# Patient Record
Sex: MALE | Race: WHITE | Employment: FULL TIME | URBAN - METROPOLITAN AREA
[De-identification: names, ages, dates, MRNs, and addresses within clinical notes are randomized per-mention and may not be internally consistent; named-entity substitution may affect disease eponyms.]

---

## 2020-06-06 ENCOUNTER — APPOINTMENT (OUTPATIENT)
Dept: GENERAL RADIOLOGY | Age: 35
End: 2020-06-06
Payer: COMMERCIAL

## 2020-06-06 ENCOUNTER — HOSPITAL ENCOUNTER (EMERGENCY)
Age: 35
Discharge: HOME | End: 2020-06-06
Attending: EMERGENCY MEDICINE
Payer: COMMERCIAL

## 2020-06-06 VITALS
RESPIRATION RATE: 16 BRPM | SYSTOLIC BLOOD PRESSURE: 138 MMHG | HEART RATE: 84 BPM | HEIGHT: 70 IN | DIASTOLIC BLOOD PRESSURE: 80 MMHG | WEIGHT: 145 LBS | TEMPERATURE: 98.2 F | BODY MASS INDEX: 20.76 KG/M2 | OXYGEN SATURATION: 96 %

## 2020-06-06 LAB
ANION GAP SERPL CALCULATED.3IONS-SCNC: 21 MMOL/L (ref 7–16)
BASOPHILS ABSOLUTE: 0.08 E9/L (ref 0–0.2)
BASOPHILS RELATIVE PERCENT: 1.2 % (ref 0–2)
BUN BLDV-MCNC: 12 MG/DL (ref 6–20)
CALCIUM SERPL-MCNC: 9.6 MG/DL (ref 8.6–10.2)
CHLORIDE BLD-SCNC: 99 MMOL/L (ref 98–107)
CO2: 21 MMOL/L (ref 22–29)
CREAT SERPL-MCNC: 0.8 MG/DL (ref 0.7–1.2)
EOSINOPHILS ABSOLUTE: 0.23 E9/L (ref 0.05–0.5)
EOSINOPHILS RELATIVE PERCENT: 3.3 % (ref 0–6)
GFR AFRICAN AMERICAN: >60
GFR NON-AFRICAN AMERICAN: >60 ML/MIN/1.73
GLUCOSE BLD-MCNC: 83 MG/DL (ref 74–99)
HCT VFR BLD CALC: 47.4 % (ref 37–54)
HEMOGLOBIN: 16.3 G/DL (ref 12.5–16.5)
IMMATURE GRANULOCYTES #: 0.03 E9/L
IMMATURE GRANULOCYTES %: 0.4 % (ref 0–5)
LYMPHOCYTES ABSOLUTE: 2.17 E9/L (ref 1.5–4)
LYMPHOCYTES RELATIVE PERCENT: 31.6 % (ref 20–42)
MCH RBC QN AUTO: 33 PG (ref 26–35)
MCHC RBC AUTO-ENTMCNC: 34.4 % (ref 32–34.5)
MCV RBC AUTO: 96 FL (ref 80–99.9)
MONOCYTES ABSOLUTE: 0.5 E9/L (ref 0.1–0.95)
MONOCYTES RELATIVE PERCENT: 7.3 % (ref 2–12)
NEUTROPHILS ABSOLUTE: 3.86 E9/L (ref 1.8–7.3)
NEUTROPHILS RELATIVE PERCENT: 56.2 % (ref 43–80)
PDW BLD-RTO: 13.2 FL (ref 11.5–15)
PLATELET # BLD: 180 E9/L (ref 130–450)
PMV BLD AUTO: 10.5 FL (ref 7–12)
POTASSIUM REFLEX MAGNESIUM: 3.9 MMOL/L (ref 3.5–5)
RBC # BLD: 4.94 E12/L (ref 3.8–5.8)
SODIUM BLD-SCNC: 141 MMOL/L (ref 132–146)
TROPONIN: <0.01 NG/ML (ref 0–0.03)
WBC # BLD: 6.9 E9/L (ref 4.5–11.5)

## 2020-06-06 PROCEDURE — 85025 COMPLETE CBC W/AUTO DIFF WBC: CPT

## 2020-06-06 PROCEDURE — 36415 COLL VENOUS BLD VENIPUNCTURE: CPT

## 2020-06-06 PROCEDURE — 71045 X-RAY EXAM CHEST 1 VIEW: CPT

## 2020-06-06 PROCEDURE — 93005 ELECTROCARDIOGRAM TRACING: CPT | Performed by: EMERGENCY MEDICINE

## 2020-06-06 PROCEDURE — 80048 BASIC METABOLIC PNL TOTAL CA: CPT

## 2020-06-06 PROCEDURE — 6360000002 HC RX W HCPCS: Performed by: EMERGENCY MEDICINE

## 2020-06-06 PROCEDURE — 84484 ASSAY OF TROPONIN QUANT: CPT

## 2020-06-06 PROCEDURE — 96374 THER/PROPH/DIAG INJ IV PUSH: CPT

## 2020-06-06 PROCEDURE — 99285 EMERGENCY DEPT VISIT HI MDM: CPT

## 2020-06-06 PROCEDURE — 2580000003 HC RX 258: Performed by: EMERGENCY MEDICINE

## 2020-06-06 RX ORDER — 0.9 % SODIUM CHLORIDE 0.9 %
1000 INTRAVENOUS SOLUTION INTRAVENOUS ONCE
Status: COMPLETED | OUTPATIENT
Start: 2020-06-06 | End: 2020-06-06

## 2020-06-06 RX ORDER — LORAZEPAM 2 MG/ML
0.5 INJECTION INTRAMUSCULAR EVERY 6 HOURS PRN
Status: DISCONTINUED | OUTPATIENT
Start: 2020-06-06 | End: 2020-06-06 | Stop reason: HOSPADM

## 2020-06-06 RX ADMIN — SODIUM CHLORIDE 1000 ML: 9 INJECTION, SOLUTION INTRAVENOUS at 14:46

## 2020-06-06 RX ADMIN — LORAZEPAM 0.5 MG: 2 INJECTION INTRAMUSCULAR; INTRAVENOUS at 14:45

## 2020-06-06 ASSESSMENT — ENCOUNTER SYMPTOMS
COUGH: 0
ABDOMINAL PAIN: 0
SHORTNESS OF BREATH: 0
BACK PAIN: 0

## 2020-06-07 LAB
EKG ATRIAL RATE: 90 BPM
EKG P AXIS: 56 DEGREES
EKG P-R INTERVAL: 142 MS
EKG Q-T INTERVAL: 376 MS
EKG QRS DURATION: 100 MS
EKG QTC CALCULATION (BAZETT): 459 MS
EKG R AXIS: 88 DEGREES
EKG T AXIS: 71 DEGREES
EKG VENTRICULAR RATE: 90 BPM

## 2020-06-07 PROCEDURE — 93010 ELECTROCARDIOGRAM REPORT: CPT | Performed by: INTERNAL MEDICINE

## 2020-06-07 NOTE — ED PROVIDER NOTES
This is a 20-year-old male with a past medical history of anxiety marijuana abuse and alcohol abuse presents to the ED for evaluation of palpitations. Patient states that he last smoked marijuana earlier this morning and states that shortly prior to arrival while he was getting ready to eat he developed sudden onset palpitations. She states that he then grew anxious. States that his felt as though his heart was racing and get a jump through his chest.  Patient had any chest pain or shortness of breath. Denied any leg pain leg swelling suicidal or homicidal ideation. The patient denied any mitigating or exacerbating factors to these palpitations. She states that he has had these before in the past and chalked up to anxiety. Patient states that he does not use any other drugs. States that he last drank last night and drinks approximately 4 drinks daily. The history is provided by the patient. No  was used. Review of Systems   Constitutional: Negative for fever. HENT: Negative for congestion. Eyes: Negative for visual disturbance. Respiratory: Negative for cough and shortness of breath. Cardiovascular: Positive for palpitations. Negative for chest pain. Gastrointestinal: Negative for abdominal pain. Genitourinary: Negative for dysuria. Musculoskeletal: Negative for back pain. Skin: Negative for rash. Neurological: Negative for light-headedness. Psychiatric/Behavioral: The patient is nervous/anxious. Physical Exam  Vitals signs and nursing note reviewed. Constitutional:       Appearance: Normal appearance. HENT:      Head: Normocephalic. Mouth/Throat:      Mouth: Mucous membranes are moist.   Eyes:      Extraocular Movements: Extraocular movements intact. Pupils: Pupils are equal, round, and reactive to light. Neck:      Musculoskeletal: Normal range of motion and neck supple.    Cardiovascular:      Rate and Rhythm: Normal rate and follow-up. There are no discharge medications for this patient. Diagnosis:  1. Palpitations    2. Marijuana use    3. Elevated blood pressure reading        Disposition:  Patient's disposition: Discharge to home  Patient's condition is stable.           Parish Ace DO  Resident  06/06/20 [id number]

## 2022-05-13 ENCOUNTER — APPOINTMENT (OUTPATIENT)
Dept: CT IMAGING | Age: 37
End: 2022-05-13
Payer: COMMERCIAL

## 2022-05-13 ENCOUNTER — APPOINTMENT (OUTPATIENT)
Dept: GENERAL RADIOLOGY | Age: 37
End: 2022-05-13
Payer: COMMERCIAL

## 2022-05-13 ENCOUNTER — HOSPITAL ENCOUNTER (INPATIENT)
Age: 37
LOS: 2 days | Discharge: HOME | DRG: 438 | End: 2022-05-15
Attending: INTERNAL MEDICINE | Admitting: INTERNAL MEDICINE
Payer: COMMERCIAL

## 2022-05-13 ENCOUNTER — HOSPITAL ENCOUNTER (EMERGENCY)
Age: 37
Discharge: ANOTHER HOSPITAL | End: 2022-05-13
Attending: EMERGENCY MEDICINE
Payer: COMMERCIAL

## 2022-05-13 VITALS
SYSTOLIC BLOOD PRESSURE: 128 MMHG | OXYGEN SATURATION: 98 % | DIASTOLIC BLOOD PRESSURE: 89 MMHG | HEART RATE: 138 BPM | WEIGHT: 130 LBS | RESPIRATION RATE: 18 BRPM | BODY MASS INDEX: 18.61 KG/M2 | TEMPERATURE: 98.9 F | HEIGHT: 70 IN

## 2022-05-13 DIAGNOSIS — K85.90 ACUTE PANCREATITIS, UNSPECIFIED COMPLICATION STATUS, UNSPECIFIED PANCREATITIS TYPE: Primary | ICD-10-CM

## 2022-05-13 DIAGNOSIS — R73.9 HYPERGLYCEMIA: ICD-10-CM

## 2022-05-13 DIAGNOSIS — F10.930 ALCOHOL WITHDRAWAL SYNDROME WITHOUT COMPLICATION (HCC): ICD-10-CM

## 2022-05-13 DIAGNOSIS — N17.9 AKI (ACUTE KIDNEY INJURY) (HCC): ICD-10-CM

## 2022-05-13 PROBLEM — K85.00 IDIOPATHIC ACUTE PANCREATITIS: Status: ACTIVE | Noted: 2022-05-13

## 2022-05-13 LAB
ACETAMINOPHEN LEVEL: <5 MCG/ML (ref 10–30)
ADENOVIRUS BY PCR: NOT DETECTED
ALBUMIN SERPL-MCNC: 4.1 G/DL (ref 3.5–5.2)
ALBUMIN SERPL-MCNC: 5.5 G/DL (ref 3.5–5.2)
ALP BLD-CCNC: 57 U/L (ref 40–129)
ALP BLD-CCNC: 84 U/L (ref 40–129)
ALT SERPL-CCNC: 44 U/L (ref 0–40)
ALT SERPL-CCNC: 71 U/L (ref 0–40)
AMPHETAMINE SCREEN, URINE: NOT DETECTED
ANION GAP SERPL CALCULATED.3IONS-SCNC: 16 MMOL/L (ref 7–16)
ANION GAP SERPL CALCULATED.3IONS-SCNC: 40 MMOL/L (ref 7–16)
AST SERPL-CCNC: 40 U/L (ref 0–39)
AST SERPL-CCNC: 59 U/L (ref 0–39)
B.E.: -5.6 MMOL/L (ref -3–3)
BACTERIA: ABNORMAL /HPF
BARBITURATE SCREEN URINE: NOT DETECTED
BASOPHILS ABSOLUTE: 0.01 E9/L (ref 0–0.2)
BASOPHILS ABSOLUTE: 0.03 E9/L (ref 0–0.2)
BASOPHILS RELATIVE PERCENT: 0.1 % (ref 0–2)
BASOPHILS RELATIVE PERCENT: 0.2 % (ref 0–2)
BENZODIAZEPINE SCREEN, URINE: NOT DETECTED
BETA-HYDROXYBUTYRATE: >4.5 MMOL/L (ref 0.02–0.27)
BILIRUB SERPL-MCNC: 0.6 MG/DL (ref 0–1.2)
BILIRUB SERPL-MCNC: 0.9 MG/DL (ref 0–1.2)
BILIRUBIN URINE: ABNORMAL
BLOOD, URINE: ABNORMAL
BORDETELLA PARAPERTUSSIS BY PCR: NOT DETECTED
BORDETELLA PERTUSSIS BY PCR: NOT DETECTED
BUN BLDV-MCNC: 33 MG/DL (ref 6–20)
BUN BLDV-MCNC: 33 MG/DL (ref 6–20)
CALCIUM SERPL-MCNC: 8.5 MG/DL (ref 8.6–10.2)
CALCIUM SERPL-MCNC: 9.8 MG/DL (ref 8.6–10.2)
CANNABINOID SCREEN URINE: POSITIVE
CARDIOPULMONARY BYPASS: NO
CHLAMYDOPHILIA PNEUMONIAE BY PCR: NOT DETECTED
CHLORIDE BLD-SCNC: 105 MMOL/L (ref 98–107)
CHLORIDE BLD-SCNC: 88 MMOL/L (ref 98–107)
CLARITY: CLEAR
CO2: 10 MMOL/L (ref 22–29)
CO2: 19 MMOL/L (ref 22–29)
COCAINE METABOLITE SCREEN URINE: NOT DETECTED
COLOR: YELLOW
CORONAVIRUS 229E BY PCR: NOT DETECTED
CORONAVIRUS HKU1 BY PCR: NOT DETECTED
CORONAVIRUS NL63 BY PCR: NOT DETECTED
CORONAVIRUS OC43 BY PCR: NOT DETECTED
CREAT SERPL-MCNC: 0.8 MG/DL (ref 0.7–1.2)
CREAT SERPL-MCNC: 1.7 MG/DL (ref 0.7–1.2)
DELIVERY SYSTEMS: ABNORMAL
DEVICE: ABNORMAL
EOSINOPHILS ABSOLUTE: 0 E9/L (ref 0.05–0.5)
EOSINOPHILS ABSOLUTE: 0 E9/L (ref 0.05–0.5)
EOSINOPHILS RELATIVE PERCENT: 0 % (ref 0–6)
EOSINOPHILS RELATIVE PERCENT: 0 % (ref 0–6)
EPITHELIAL CELLS, UA: ABNORMAL /HPF
ETHANOL: <10 MG/DL (ref 0–0.08)
FENTANYL SCREEN, URINE: NOT DETECTED
FIO2: 21
GFR AFRICAN AMERICAN: 55
GFR AFRICAN AMERICAN: >60
GFR NON-AFRICAN AMERICAN: 46 ML/MIN/1.73
GFR NON-AFRICAN AMERICAN: >60 ML/MIN/1.73
GLUCOSE BLD-MCNC: 170 MG/DL (ref 74–99)
GLUCOSE BLD-MCNC: 281 MG/DL (ref 74–99)
GLUCOSE URINE: NEGATIVE MG/DL
HCO3: 17.7 MMOL/L (ref 22–26)
HCT VFR BLD CALC: 34.5 % (ref 37–54)
HCT VFR BLD CALC: 47.3 % (ref 37–54)
HEMOGLOBIN: 12.1 G/DL (ref 12.5–16.5)
HEMOGLOBIN: 16.9 G/DL (ref 12.5–16.5)
HUMAN METAPNEUMOVIRUS BY PCR: NOT DETECTED
HUMAN RHINOVIRUS/ENTEROVIRUS BY PCR: NOT DETECTED
IMMATURE GRANULOCYTES #: 0.06 E9/L
IMMATURE GRANULOCYTES #: 0.12 E9/L
IMMATURE GRANULOCYTES %: 0.4 % (ref 0–5)
IMMATURE GRANULOCYTES %: 0.6 % (ref 0–5)
INFLUENZA A BY PCR: NOT DETECTED
INFLUENZA B BY PCR: NOT DETECTED
INR BLD: 0.9
KETONES, URINE: >=80 MG/DL
LACTIC ACID: 1.1 MMOL/L (ref 0.5–2.2)
LACTIC ACID: 1.1 MMOL/L (ref 0.5–2.2)
LACTIC ACID: 2.9 MMOL/L (ref 0.5–2.2)
LEUKOCYTE ESTERASE, URINE: NEGATIVE
LIPASE: 367 U/L (ref 13–60)
LIPASE: 394 U/L (ref 13–60)
LYMPHOCYTES ABSOLUTE: 0.72 E9/L (ref 1.5–4)
LYMPHOCYTES ABSOLUTE: 0.86 E9/L (ref 1.5–4)
LYMPHOCYTES RELATIVE PERCENT: 3.8 % (ref 20–42)
LYMPHOCYTES RELATIVE PERCENT: 5.6 % (ref 20–42)
Lab: ABNORMAL
MAGNESIUM: 2.2 MG/DL (ref 1.6–2.6)
MCH RBC QN AUTO: 34.8 PG (ref 26–35)
MCH RBC QN AUTO: 35.2 PG (ref 26–35)
MCHC RBC AUTO-ENTMCNC: 35.1 % (ref 32–34.5)
MCHC RBC AUTO-ENTMCNC: 35.7 % (ref 32–34.5)
MCV RBC AUTO: 100.3 FL (ref 80–99.9)
MCV RBC AUTO: 97.3 FL (ref 80–99.9)
METHADONE SCREEN, URINE: NOT DETECTED
MONOCYTES ABSOLUTE: 1.26 E9/L (ref 0.1–0.95)
MONOCYTES ABSOLUTE: 1.28 E9/L (ref 0.1–0.95)
MONOCYTES RELATIVE PERCENT: 6.6 % (ref 2–12)
MONOCYTES RELATIVE PERCENT: 8.4 % (ref 2–12)
MYCOPLASMA PNEUMONIAE BY PCR: NOT DETECTED
NEUTROPHILS ABSOLUTE: 13.06 E9/L (ref 1.8–7.3)
NEUTROPHILS ABSOLUTE: 17.03 E9/L (ref 1.8–7.3)
NEUTROPHILS RELATIVE PERCENT: 85.5 % (ref 43–80)
NEUTROPHILS RELATIVE PERCENT: 88.8 % (ref 43–80)
NITRITE, URINE: NEGATIVE
O2 SATURATION: 97.2 % (ref 92–98.5)
OPERATOR ID: ABNORMAL
OPIATE SCREEN URINE: NOT DETECTED
OXYCODONE URINE: NOT DETECTED
PARAINFLUENZA VIRUS 1 BY PCR: NOT DETECTED
PARAINFLUENZA VIRUS 2 BY PCR: NOT DETECTED
PARAINFLUENZA VIRUS 3 BY PCR: NOT DETECTED
PARAINFLUENZA VIRUS 4 BY PCR: NOT DETECTED
PCO2 37: 28.1 MMHG (ref 35–45)
PDW BLD-RTO: 12.4 FL (ref 11.5–15)
PDW BLD-RTO: 12.7 FL (ref 11.5–15)
PH 37: 7.41 (ref 7.35–7.45)
PH UA: 6 (ref 5–9)
PHENCYCLIDINE SCREEN URINE: NOT DETECTED
PHOSPHORUS: 0.6 MG/DL (ref 2.5–4.5)
PLATELET # BLD: 220 E9/L (ref 130–450)
PLATELET # BLD: 293 E9/L (ref 130–450)
PMV BLD AUTO: 10.6 FL (ref 7–12)
PMV BLD AUTO: 10.8 FL (ref 7–12)
PO2 37: 90.2 MMHG (ref 80–100)
POC SOURCE: ABNORMAL
POTASSIUM REFLEX MAGNESIUM: 4.3 MMOL/L (ref 3.5–5)
POTASSIUM SERPL-SCNC: 4.4 MMOL/L (ref 3.5–5)
PROTEIN UA: 30 MG/DL
PROTHROMBIN TIME: 10.2 SEC (ref 9.3–12.4)
RBC # BLD: 3.44 E12/L (ref 3.8–5.8)
RBC # BLD: 4.86 E12/L (ref 3.8–5.8)
RBC UA: ABNORMAL /HPF (ref 0–2)
RESPIRATORY SYNCYTIAL VIRUS BY PCR: NOT DETECTED
SALICYLATE, SERUM: 2.5 MG/DL (ref 0–30)
SARS-COV-2, PCR: NOT DETECTED
SODIUM BLD-SCNC: 138 MMOL/L (ref 132–146)
SODIUM BLD-SCNC: 140 MMOL/L (ref 132–146)
SPECIFIC GRAVITY UA: 1.02 (ref 1–1.03)
TOTAL CK: 34 U/L (ref 20–200)
TOTAL PROTEIN: 6.3 G/DL (ref 6.4–8.3)
TOTAL PROTEIN: 8.4 G/DL (ref 6.4–8.3)
TRICYCLIC ANTIDEPRESSANTS SCREEN SERUM: NEGATIVE NG/ML
TROPONIN, HIGH SENSITIVITY: 12 NG/L (ref 0–11)
TROPONIN, HIGH SENSITIVITY: 14 NG/L (ref 0–11)
UROBILINOGEN, URINE: 0.2 E.U./DL
WBC # BLD: 15.3 E9/L (ref 4.5–11.5)
WBC # BLD: 19.2 E9/L (ref 4.5–11.5)
WBC UA: ABNORMAL /HPF (ref 0–5)

## 2022-05-13 PROCEDURE — 85610 PROTHROMBIN TIME: CPT

## 2022-05-13 PROCEDURE — 80143 DRUG ASSAY ACETAMINOPHEN: CPT

## 2022-05-13 PROCEDURE — 2000000000 HC ICU R&B

## 2022-05-13 PROCEDURE — 82010 KETONE BODYS QUAN: CPT

## 2022-05-13 PROCEDURE — 85025 COMPLETE CBC W/AUTO DIFF WBC: CPT

## 2022-05-13 PROCEDURE — 6360000002 HC RX W HCPCS: Performed by: EMERGENCY MEDICINE

## 2022-05-13 PROCEDURE — 84100 ASSAY OF PHOSPHORUS: CPT

## 2022-05-13 PROCEDURE — 83735 ASSAY OF MAGNESIUM: CPT

## 2022-05-13 PROCEDURE — 36415 COLL VENOUS BLD VENIPUNCTURE: CPT

## 2022-05-13 PROCEDURE — 71045 X-RAY EXAM CHEST 1 VIEW: CPT

## 2022-05-13 PROCEDURE — 83605 ASSAY OF LACTIC ACID: CPT

## 2022-05-13 PROCEDURE — 82803 BLOOD GASES ANY COMBINATION: CPT

## 2022-05-13 PROCEDURE — 2500000003 HC RX 250 WO HCPCS: Performed by: STUDENT IN AN ORGANIZED HEALTH CARE EDUCATION/TRAINING PROGRAM

## 2022-05-13 PROCEDURE — 6360000002 HC RX W HCPCS: Performed by: STUDENT IN AN ORGANIZED HEALTH CARE EDUCATION/TRAINING PROGRAM

## 2022-05-13 PROCEDURE — 2580000003 HC RX 258: Performed by: STUDENT IN AN ORGANIZED HEALTH CARE EDUCATION/TRAINING PROGRAM

## 2022-05-13 PROCEDURE — 93005 ELECTROCARDIOGRAM TRACING: CPT | Performed by: EMERGENCY MEDICINE

## 2022-05-13 PROCEDURE — 0202U NFCT DS 22 TRGT SARS-COV-2: CPT

## 2022-05-13 PROCEDURE — 82077 ASSAY SPEC XCP UR&BREATH IA: CPT

## 2022-05-13 PROCEDURE — 82550 ASSAY OF CK (CPK): CPT

## 2022-05-13 PROCEDURE — 80053 COMPREHEN METABOLIC PANEL: CPT

## 2022-05-13 PROCEDURE — 6370000000 HC RX 637 (ALT 250 FOR IP): Performed by: STUDENT IN AN ORGANIZED HEALTH CARE EDUCATION/TRAINING PROGRAM

## 2022-05-13 PROCEDURE — 81001 URINALYSIS AUTO W/SCOPE: CPT

## 2022-05-13 PROCEDURE — 84484 ASSAY OF TROPONIN QUANT: CPT

## 2022-05-13 PROCEDURE — 87081 CULTURE SCREEN ONLY: CPT

## 2022-05-13 PROCEDURE — 96374 THER/PROPH/DIAG INJ IV PUSH: CPT

## 2022-05-13 PROCEDURE — 2500000003 HC RX 250 WO HCPCS: Performed by: INTERNAL MEDICINE

## 2022-05-13 PROCEDURE — 83690 ASSAY OF LIPASE: CPT

## 2022-05-13 PROCEDURE — 99285 EMERGENCY DEPT VISIT HI MDM: CPT

## 2022-05-13 PROCEDURE — 80307 DRUG TEST PRSMV CHEM ANLYZR: CPT

## 2022-05-13 PROCEDURE — 2580000003 HC RX 258: Performed by: EMERGENCY MEDICINE

## 2022-05-13 PROCEDURE — 80179 DRUG ASSAY SALICYLATE: CPT

## 2022-05-13 PROCEDURE — 96376 TX/PRO/DX INJ SAME DRUG ADON: CPT

## 2022-05-13 PROCEDURE — 2580000003 HC RX 258: Performed by: INTERNAL MEDICINE

## 2022-05-13 PROCEDURE — 96375 TX/PRO/DX INJ NEW DRUG ADDON: CPT

## 2022-05-13 PROCEDURE — 74176 CT ABD & PELVIS W/O CONTRAST: CPT

## 2022-05-13 RX ORDER — THIAMINE HYDROCHLORIDE 100 MG/ML
100 INJECTION, SOLUTION INTRAMUSCULAR; INTRAVENOUS DAILY
Status: DISCONTINUED | OUTPATIENT
Start: 2022-05-13 | End: 2022-05-15 | Stop reason: HOSPADM

## 2022-05-13 RX ORDER — LORAZEPAM 1 MG/1
4 TABLET ORAL
Status: DISCONTINUED | OUTPATIENT
Start: 2022-05-13 | End: 2022-05-13 | Stop reason: HOSPADM

## 2022-05-13 RX ORDER — MULTIVITAMIN WITH IRON
1 TABLET ORAL DAILY
Status: DISCONTINUED | OUTPATIENT
Start: 2022-05-13 | End: 2022-05-13 | Stop reason: HOSPADM

## 2022-05-13 RX ORDER — LORAZEPAM 1 MG/1
2 TABLET ORAL
Status: DISCONTINUED | OUTPATIENT
Start: 2022-05-13 | End: 2022-05-15 | Stop reason: HOSPADM

## 2022-05-13 RX ORDER — ONDANSETRON 2 MG/ML
4 INJECTION INTRAMUSCULAR; INTRAVENOUS EVERY 6 HOURS PRN
Status: DISCONTINUED | OUTPATIENT
Start: 2022-05-13 | End: 2022-05-15 | Stop reason: HOSPADM

## 2022-05-13 RX ORDER — LORAZEPAM 1 MG/1
4 TABLET ORAL
Status: DISCONTINUED | OUTPATIENT
Start: 2022-05-13 | End: 2022-05-15 | Stop reason: HOSPADM

## 2022-05-13 RX ORDER — FOLIC ACID 5 MG/ML
1 INJECTION, SOLUTION INTRAMUSCULAR; INTRAVENOUS; SUBCUTANEOUS DAILY
Status: DISCONTINUED | OUTPATIENT
Start: 2022-05-13 | End: 2022-05-15 | Stop reason: HOSPADM

## 2022-05-13 RX ORDER — GAUZE BANDAGE 2" X 2"
100 BANDAGE TOPICAL DAILY
Status: DISCONTINUED | OUTPATIENT
Start: 2022-05-13 | End: 2022-05-13 | Stop reason: ALTCHOICE

## 2022-05-13 RX ORDER — PAROXETINE 10 MG/1
10 TABLET, FILM COATED ORAL EVERY MORNING
COMMUNITY

## 2022-05-13 RX ORDER — POTASSIUM CHLORIDE 7.45 MG/ML
10 INJECTION INTRAVENOUS PRN
Status: DISCONTINUED | OUTPATIENT
Start: 2022-05-13 | End: 2022-05-15 | Stop reason: HOSPADM

## 2022-05-13 RX ORDER — LORAZEPAM 1 MG/1
3 TABLET ORAL
Status: DISCONTINUED | OUTPATIENT
Start: 2022-05-13 | End: 2022-05-15 | Stop reason: HOSPADM

## 2022-05-13 RX ORDER — MAGNESIUM SULFATE 1 G/100ML
1000 INJECTION INTRAVENOUS PRN
Status: DISCONTINUED | OUTPATIENT
Start: 2022-05-13 | End: 2022-05-15 | Stop reason: HOSPADM

## 2022-05-13 RX ORDER — LORAZEPAM 2 MG/ML
3 INJECTION INTRAMUSCULAR
Status: DISCONTINUED | OUTPATIENT
Start: 2022-05-13 | End: 2022-05-15 | Stop reason: HOSPADM

## 2022-05-13 RX ORDER — SODIUM CHLORIDE 0.9 % (FLUSH) 0.9 %
5-40 SYRINGE (ML) INJECTION EVERY 12 HOURS SCHEDULED
Status: DISCONTINUED | OUTPATIENT
Start: 2022-05-13 | End: 2022-05-15 | Stop reason: HOSPADM

## 2022-05-13 RX ORDER — LORAZEPAM 2 MG/ML
1 INJECTION INTRAMUSCULAR ONCE
Status: COMPLETED | OUTPATIENT
Start: 2022-05-13 | End: 2022-05-13

## 2022-05-13 RX ORDER — SODIUM CHLORIDE 0.9 % (FLUSH) 0.9 %
5-40 SYRINGE (ML) INJECTION PRN
Status: DISCONTINUED | OUTPATIENT
Start: 2022-05-13 | End: 2022-05-15 | Stop reason: HOSPADM

## 2022-05-13 RX ORDER — THIAMINE HYDROCHLORIDE 100 MG/ML
100 INJECTION, SOLUTION INTRAMUSCULAR; INTRAVENOUS DAILY
Status: DISCONTINUED | OUTPATIENT
Start: 2022-05-13 | End: 2022-05-13 | Stop reason: HOSPADM

## 2022-05-13 RX ORDER — LORAZEPAM 1 MG/1
1 TABLET ORAL
Status: DISCONTINUED | OUTPATIENT
Start: 2022-05-13 | End: 2022-05-15 | Stop reason: HOSPADM

## 2022-05-13 RX ORDER — 0.9 % SODIUM CHLORIDE 0.9 %
1000 INTRAVENOUS SOLUTION INTRAVENOUS ONCE
Status: COMPLETED | OUTPATIENT
Start: 2022-05-13 | End: 2022-05-13

## 2022-05-13 RX ORDER — POTASSIUM CHLORIDE 20 MEQ/1
40 TABLET, EXTENDED RELEASE ORAL PRN
Status: DISCONTINUED | OUTPATIENT
Start: 2022-05-13 | End: 2022-05-15 | Stop reason: HOSPADM

## 2022-05-13 RX ORDER — LORAZEPAM 2 MG/ML
4 INJECTION INTRAMUSCULAR
Status: DISCONTINUED | OUTPATIENT
Start: 2022-05-13 | End: 2022-05-15 | Stop reason: HOSPADM

## 2022-05-13 RX ORDER — LORAZEPAM 1 MG/1
3 TABLET ORAL
Status: DISCONTINUED | OUTPATIENT
Start: 2022-05-13 | End: 2022-05-13 | Stop reason: HOSPADM

## 2022-05-13 RX ORDER — SODIUM CHLORIDE 0.9 % (FLUSH) 0.9 %
5-40 SYRINGE (ML) INJECTION EVERY 12 HOURS SCHEDULED
Status: DISCONTINUED | OUTPATIENT
Start: 2022-05-13 | End: 2022-05-13 | Stop reason: HOSPADM

## 2022-05-13 RX ORDER — LORAZEPAM 2 MG/ML
1 INJECTION INTRAMUSCULAR
Status: DISCONTINUED | OUTPATIENT
Start: 2022-05-13 | End: 2022-05-13 | Stop reason: HOSPADM

## 2022-05-13 RX ORDER — LORAZEPAM 1 MG/1
1 TABLET ORAL
Status: DISCONTINUED | OUTPATIENT
Start: 2022-05-13 | End: 2022-05-13 | Stop reason: HOSPADM

## 2022-05-13 RX ORDER — LORAZEPAM 1 MG/1
2 TABLET ORAL
Status: DISCONTINUED | OUTPATIENT
Start: 2022-05-13 | End: 2022-05-13 | Stop reason: HOSPADM

## 2022-05-13 RX ORDER — LORAZEPAM 2 MG/ML
3 INJECTION INTRAMUSCULAR
Status: DISCONTINUED | OUTPATIENT
Start: 2022-05-13 | End: 2022-05-13 | Stop reason: HOSPADM

## 2022-05-13 RX ORDER — SODIUM CHLORIDE, SODIUM LACTATE, POTASSIUM CHLORIDE, CALCIUM CHLORIDE 600; 310; 30; 20 MG/100ML; MG/100ML; MG/100ML; MG/100ML
INJECTION, SOLUTION INTRAVENOUS CONTINUOUS
Status: DISCONTINUED | OUTPATIENT
Start: 2022-05-13 | End: 2022-05-13 | Stop reason: HOSPADM

## 2022-05-13 RX ORDER — LORAZEPAM 2 MG/ML
2 INJECTION INTRAMUSCULAR
Status: DISCONTINUED | OUTPATIENT
Start: 2022-05-13 | End: 2022-05-15 | Stop reason: HOSPADM

## 2022-05-13 RX ORDER — PROCHLORPERAZINE EDISYLATE 5 MG/ML
10 INJECTION INTRAMUSCULAR; INTRAVENOUS ONCE
Status: COMPLETED | OUTPATIENT
Start: 2022-05-13 | End: 2022-05-13

## 2022-05-13 RX ORDER — SODIUM CHLORIDE, SODIUM LACTATE, POTASSIUM CHLORIDE, CALCIUM CHLORIDE 600; 310; 30; 20 MG/100ML; MG/100ML; MG/100ML; MG/100ML
INJECTION, SOLUTION INTRAVENOUS CONTINUOUS
Status: DISCONTINUED | OUTPATIENT
Start: 2022-05-13 | End: 2022-05-15 | Stop reason: HOSPADM

## 2022-05-13 RX ORDER — LORAZEPAM 2 MG/ML
1 INJECTION INTRAMUSCULAR
Status: DISCONTINUED | OUTPATIENT
Start: 2022-05-13 | End: 2022-05-15 | Stop reason: HOSPADM

## 2022-05-13 RX ORDER — SODIUM CHLORIDE 9 MG/ML
INJECTION, SOLUTION INTRAVENOUS PRN
Status: DISCONTINUED | OUTPATIENT
Start: 2022-05-13 | End: 2022-05-13 | Stop reason: HOSPADM

## 2022-05-13 RX ORDER — SODIUM CHLORIDE 0.9 % (FLUSH) 0.9 %
5-40 SYRINGE (ML) INJECTION PRN
Status: DISCONTINUED | OUTPATIENT
Start: 2022-05-13 | End: 2022-05-13 | Stop reason: HOSPADM

## 2022-05-13 RX ORDER — PAROXETINE 10 MG/1
10 TABLET, FILM COATED ORAL EVERY MORNING
Status: DISCONTINUED | OUTPATIENT
Start: 2022-05-14 | End: 2022-05-15 | Stop reason: HOSPADM

## 2022-05-13 RX ORDER — LORAZEPAM 2 MG/ML
4 INJECTION INTRAMUSCULAR
Status: DISCONTINUED | OUTPATIENT
Start: 2022-05-13 | End: 2022-05-13 | Stop reason: HOSPADM

## 2022-05-13 RX ORDER — SODIUM CHLORIDE 9 MG/ML
INJECTION, SOLUTION INTRAVENOUS PRN
Status: DISCONTINUED | OUTPATIENT
Start: 2022-05-13 | End: 2022-05-15 | Stop reason: HOSPADM

## 2022-05-13 RX ORDER — LORAZEPAM 2 MG/ML
2 INJECTION INTRAMUSCULAR
Status: DISCONTINUED | OUTPATIENT
Start: 2022-05-13 | End: 2022-05-13 | Stop reason: HOSPADM

## 2022-05-13 RX ADMIN — SODIUM CHLORIDE 1000 ML: 9 INJECTION, SOLUTION INTRAVENOUS at 09:27

## 2022-05-13 RX ADMIN — SODIUM CHLORIDE, POTASSIUM CHLORIDE, SODIUM LACTATE AND CALCIUM CHLORIDE: 600; 310; 30; 20 INJECTION, SOLUTION INTRAVENOUS at 14:11

## 2022-05-13 RX ADMIN — SODIUM CHLORIDE, PRESERVATIVE FREE 10 ML: 5 INJECTION INTRAVENOUS at 21:20

## 2022-05-13 RX ADMIN — PROCHLORPERAZINE EDISYLATE 10 MG: 5 INJECTION INTRAMUSCULAR; INTRAVENOUS at 08:29

## 2022-05-13 RX ADMIN — LORAZEPAM 2 MG: 2 INJECTION INTRAMUSCULAR; INTRAVENOUS at 21:19

## 2022-05-13 RX ADMIN — THIAMINE HYDROCHLORIDE 100 MG: 100 INJECTION, SOLUTION INTRAMUSCULAR; INTRAVENOUS at 10:42

## 2022-05-13 RX ADMIN — LORAZEPAM 1 MG: 2 INJECTION INTRAMUSCULAR; INTRAVENOUS at 11:01

## 2022-05-13 RX ADMIN — THIAMINE HYDROCHLORIDE 100 MG: 100 INJECTION, SOLUTION INTRAMUSCULAR; INTRAVENOUS at 21:20

## 2022-05-13 RX ADMIN — SODIUM CHLORIDE, POTASSIUM CHLORIDE, SODIUM LACTATE AND CALCIUM CHLORIDE: 600; 310; 30; 20 INJECTION, SOLUTION INTRAVENOUS at 19:39

## 2022-05-13 RX ADMIN — LORAZEPAM 2 MG: 1 TABLET ORAL at 17:07

## 2022-05-13 RX ADMIN — FOLIC ACID 1 MG: 5 INJECTION, SOLUTION INTRAMUSCULAR; INTRAVENOUS; SUBCUTANEOUS at 17:07

## 2022-05-13 RX ADMIN — SODIUM CHLORIDE, PRESERVATIVE FREE 10 ML: 5 INJECTION INTRAVENOUS at 10:46

## 2022-05-13 RX ADMIN — SODIUM PHOSPHATE, MONOBASIC, MONOHYDRATE AND SODIUM PHOSPHATE, DIBASIC, ANHYDROUS 17.88 MMOL: 276; 142 INJECTION, SOLUTION INTRAVENOUS at 18:53

## 2022-05-13 RX ADMIN — SODIUM CHLORIDE 1000 ML: 9 INJECTION, SOLUTION INTRAVENOUS at 11:01

## 2022-05-13 RX ADMIN — SODIUM CHLORIDE 1000 ML: 9 INJECTION, SOLUTION INTRAVENOUS at 08:27

## 2022-05-13 RX ADMIN — LORAZEPAM 1 MG: 2 INJECTION INTRAMUSCULAR; INTRAVENOUS at 08:29

## 2022-05-13 ASSESSMENT — PAIN SCALES - GENERAL
PAINLEVEL_OUTOF10: 6
PAINLEVEL_OUTOF10: 0
PAINLEVEL_OUTOF10: 0

## 2022-05-13 ASSESSMENT — LIFESTYLE VARIABLES
HOW MANY STANDARD DRINKS CONTAINING ALCOHOL DO YOU HAVE ON A TYPICAL DAY: 5 OR 6
HOW OFTEN DO YOU HAVE A DRINK CONTAINING ALCOHOL: 4 OR MORE TIMES A WEEK
HOW OFTEN DO YOU HAVE A DRINK CONTAINING ALCOHOL: 4 OR MORE TIMES A WEEK
HOW MANY STANDARD DRINKS CONTAINING ALCOHOL DO YOU HAVE ON A TYPICAL DAY: 5 OR 6

## 2022-05-13 ASSESSMENT — PAIN DESCRIPTION - PAIN TYPE: TYPE: ACUTE PAIN

## 2022-05-13 ASSESSMENT — ENCOUNTER SYMPTOMS
CHOKING: 0
BLOOD IN STOOL: 0
SINUS PAIN: 0
DIARRHEA: 0
BACK PAIN: 0
ABDOMINAL PAIN: 0
SHORTNESS OF BREATH: 0
SORE THROAT: 0
ABDOMINAL PAIN: 0
COUGH: 0
SHORTNESS OF BREATH: 0
RHINORRHEA: 0
BACK PAIN: 0
EYE PAIN: 0
NAUSEA: 1
CONSTIPATION: 0
VOMITING: 1
NAUSEA: 1
COUGH: 0

## 2022-05-13 ASSESSMENT — PAIN - FUNCTIONAL ASSESSMENT
PAIN_FUNCTIONAL_ASSESSMENT: NONE - DENIES PAIN
PAIN_FUNCTIONAL_ASSESSMENT: 0-10

## 2022-05-13 ASSESSMENT — PAIN DESCRIPTION - LOCATION: LOCATION: ABDOMEN

## 2022-05-13 ASSESSMENT — PAIN DESCRIPTION - DESCRIPTORS: DESCRIPTORS: ACHING

## 2022-05-13 ASSESSMENT — PAIN DESCRIPTION - FREQUENCY: FREQUENCY: INTERMITTENT

## 2022-05-13 NOTE — ED PROVIDER NOTES
This is a 23-year-old male with a history of alcohol and marijuana abuse who presents to the ED for evaluation of nausea. Patient states since Monday has been unable to tolerate any p.o. intake. He also states he has been having some abdominal pain mostly located in his epigastric region. Patient states that he has been throwing up multiple times a day and cannot keep anything down. Patient remarks that still continues to smoke every day notes that his last drink was approximately 3 days ago. Patient states that he has no ill contacts although does state that he did eat some old pizza back on Monday. No recent abdominal surgeries fevers or chills. Patient also states that he feels achy all over. No SI or HI. No reported chest pain traumas or falls. No other reported mitigating or exacerbating factors. The history is provided by the patient. Review of Systems   Constitutional: Negative for fever. HENT: Negative for congestion. Eyes: Negative for visual disturbance. Respiratory: Negative for cough, choking and shortness of breath. Cardiovascular: Negative for chest pain. Gastrointestinal: Positive for nausea. Negative for abdominal pain. Endocrine: Negative for polyuria. Genitourinary: Negative for dysuria. Musculoskeletal: Positive for myalgias. Negative for back pain. Skin: Negative for rash. Allergic/Immunologic: Negative for immunocompromised state. Neurological: Negative for headaches. Hematological: Does not bruise/bleed easily. Psychiatric/Behavioral: Negative for confusion. Physical Exam  Vitals and nursing note reviewed. Constitutional:       General: He is not in acute distress. Appearance: He is well-developed. Comments: Thin   HENT:      Head: Normocephalic and atraumatic. Mouth/Throat:      Mouth: Mucous membranes are dry. Eyes:      Extraocular Movements: Extraocular movements intact.       Pupils: Pupils are equal, round, and reactive to light. Neck:      Vascular: No JVD. Cardiovascular:      Rate and Rhythm: Regular rhythm. Tachycardia present. Pulmonary:      Effort: Pulmonary effort is normal.      Breath sounds: No wheezing, rhonchi or rales. Abdominal:      General: There is no distension. Palpations: Abdomen is soft. Tenderness: There is no guarding or rebound. Hernia: No hernia is present. Comments: Epigastric tenderness to palpation   Musculoskeletal:      Cervical back: Normal range of motion and neck supple. Right lower leg: No edema. Left lower leg: No edema. Skin:     General: Skin is warm and dry. Capillary Refill: Capillary refill takes less than 2 seconds. Neurological:      General: No focal deficit present. Mental Status: He is alert and oriented to person, place, and time. Mental status is at baseline. Cranial Nerves: No cranial nerve deficit. Psychiatric:         Mood and Affect: Mood normal.         Behavior: Behavior normal.          Procedures     MDM  Number of Diagnoses or Management Options  Acute pancreatitis, unspecified complication status, unspecified pancreatitis type  JUDD (acute kidney injury) ([facility] [address]  Alcohol withdrawal syndrome without complication ([facility] [address]  Hyperglycemia  Diagnosis management comments: Patient presented to the ED for evaluation of nausea, vomiting and body aches. He had an anion gap as well as tachycardia. Patient had differential including, DKA, alcohol withdrawal and pancreaitis to name a few. Patient did have a large anion gap, was placed on CIWA protocol and started on IV fluids, He did have some improvement of HR but continued to be tachcyardic. Patient was not acidotic but did have ketones in urine as well as elevated beta hydroxybutrate. Will recheck BMP to reasses chemistry panel and need for insulin. Spoke with Dr. Augustine Shin as well as Dr. Dimas Cannon.        ED Course as of 05/13/22 1515   Fri May 13, 2022   0848 HR improving [BP] [address] [place] [address] Avenue with Dr. Colby Nazario who accepted patient in ICU, requested ABG and Beta Hydroxy as well as molecular film array [BP]   [phone] with Dr. Tj Dent who did accept patient [BP]      ED Course User Index  [BP] Cynthia Joseph DO         ED Course as of 05/13/22 1515   Fri May 13, 2022   0848 HR improving [BP]   1143 Spoke with Dr. Colby Nazario who accepted patient in ICU, requested ABG and Beta Hydroxy as well as molecular film array [BP]   [phone] with Dr. Tj Dent who did accept patient [BP]      ED Course User Index  [BP] Cynthia Joseph DO       --------------------------------------------- PAST HISTORY ---------------------------------------------  Past Medical History:  has a past medical history of Benign essential tremor and Tremor. Past Surgical History:  has a past surgical history that includes [place] tooth extraction. Social History:  reports that he has been smoking cigarettes. He has been smoking about 0.25 packs per day. He has never used smokeless tobacco. He reports current alcohol use of about 5.0 - 6.0 standard drinks of alcohol per week. He reports current drug use. Drug: Marijuana Rodena Capes). Family History: family history is not on file. The patients home medications have been reviewed.     Allergies: Cipro xr    -------------------------------------------------- RESULTS -------------------------------------------------    LABS:  Results for orders placed or performed during the hospital encounter of 05/13/22   Comprehensive Metabolic Panel w/ Reflex to MG   Result Value Ref Range    Sodium 138 132 - 146 mmol/L    Potassium reflex Magnesium 4.3 3.5 - 5.0 mmol/L    Chloride 88 (L) 98 - 107 mmol/L    CO2 10 (L) 22 - 29 mmol/L    Anion Gap 40 (H) 7 - 16 mmol/L    Glucose 281 (H) 74 - 99 mg/dL    BUN 33 (H) 6 - 20 mg/dL    CREATININE 1.7 (H) 0.7 - 1.2 mg/dL    GFR Non-African American 46 >=60 mL/min/1.73    GFR African American 55     Calcium 9.8 8.6 - 10.2 mg/dL    Total Protein 8.4 (H) 6.4 - 8.3 g/dL    Albumin 5.5 (H) 3.5 - 5.2 g/dL    Total Bilirubin 0.9 0.0 - 1.2 mg/dL    Alkaline Phosphatase 84 40 - 129 U/L    ALT 71 (H) 0 - 40 U/L    AST 59 (H) 0 - 39 U/L   CBC with Auto Differential   Result Value Ref Range    WBC 19.2 (H) 4.5 - 11.5 E9/L    RBC 4.86 3.80 - 5.80 E12/L    Hemoglobin 16.9 (H) 12.5 - 16.5 g/dL    Hematocrit 47.3 37.0 - 54.0 %    MCV 97.3 80.0 - 99.9 fL    MCH 34.8 26.0 - 35.0 pg    MCHC 35.7 (H) 32.0 - 34.5 %    RDW 12.4 11.5 - 15.0 fL    Platelets 972 287 - 837 E9/L    MPV 10.6 7.0 - 12.0 fL    Neutrophils % 88.8 (H) 43.0 - 80.0 %    Immature Granulocytes % 0.6 0.0 - 5.0 %    Lymphocytes % 3.8 (L) 20.0 - 42.0 %    Monocytes % 6.6 2.0 - 12.0 %    Eosinophils % 0.0 0.0 - 6.0 %    Basophils % 0.2 0.0 - 2.0 %    Neutrophils Absolute 17.03 (H) 1.80 - 7.30 E9/L    Immature Granulocytes # 0.12 E9/L    Lymphocytes Absolute 0.72 (L) 1.50 - 4.00 E9/L    Monocytes Absolute 1.26 (H) 0.10 - 0.95 E9/L    Eosinophils Absolute 0.00 (L) 0.05 - 0.50 E9/L    Basophils Absolute 0.03 0.00 - 0.20 E9/L   Lipase   Result Value Ref Range    Lipase 367 (H) 13 - 60 U/L   Lactic Acid   Result Value Ref Range    Lactic Acid 2.9 (H) 0.5 - 2.2 mmol/L   Protime-INR   Result Value Ref Range    Protime 10.2 9.3 - 12.4 sec    INR 0.9    Urine Drug Screen   Result Value Ref Range    Drug Screen Comment: see below    Urinalysis with Microscopic   Result Value Ref Range    Color, UA Yellow Straw/Yellow    Clarity, UA Clear Clear    Glucose, Ur Negative Negative mg/dL    Bilirubin Urine MODERATE (A) Negative    Ketones, Urine >=80 (A) Negative mg/dL    Specific Gravity, UA 1.025 1.005 - 1.030    Blood, Urine TRACE-INTACT Negative    pH, UA 6.0 5.0 - 9.0    Protein, UA 30 (A) Negative mg/dL    Urobilinogen, Urine 0.2 <2.0 E.U./dL    Nitrite, Urine Negative Negative    Leukocyte Esterase, Urine Negative Negative    WBC, UA 2-5 0 - 5 /HPF    RBC, UA 0-1 0 - 2 /HPF    Epithelial Cells, UA MANY /HPF    Bacteria, UA MANY (A) None Seen /HPF   Serum Drug Screen   Result Value Ref Range    Ethanol Lvl <10 mg/dL    Acetaminophen Level <5.0 (L) 10.0 - 74.8 mcg/mL    Salicylate, Serum 2.5 0.0 - 30.0 mg/dL    TCA Scrn NEGATIVE Cutoff:300 ng/mL   Troponin   Result Value Ref Range    Troponin, High Sensitivity 14 (H) 0 - 11 ng/L   CK   Result Value Ref Range    Total CK 34 20 - 200 U/L   Lactic Acid   Result Value Ref Range    Lactic Acid 1.1 0.5 - 2.2 mmol/L   Troponin   Result Value Ref Range    Troponin, High Sensitivity 12 (H) 0 - 11 ng/L   Beta-Hydroxybutyrate   Result Value Ref Range    Beta-Hydroxybutyrate >4.50 (H) 0.02 - 0.27 mmol/L   POCT blood gases   Result Value Ref Range    POC Source Arterial     FIO2 21.0     PH 37 7.408 7.350 - 7.450    PCO2 37 28.1 (L) 35.0 - 45.0 mmHg    PO2 37 90.2 80.0 - 100.0 mmHg    HCO3 17.7 (L) 22.0 - 26.0 mmol/L    B.E. -5.6 (L) -3.0 - 3.0 mmol/L    O2 Sat 97.2 92.0 - 98.5 %    Cardiopulmonary Bypass No      ID [id number]     DEVICE [id number]     Delivery Systems [employer]    EKG 12 Lead   Result Value Ref Range    Ventricular Rate 129 BPM    Atrial Rate 129 BPM    P-R Interval 120 ms    QRS Duration 90 ms    Q-T Interval 328 ms    QTc Calculation (Bazett) 480 ms    P Axis 71 degrees    R Axis 93 degrees    T Axis 76 degrees       RADIOLOGY:  CT ABDOMEN PELVIS WO CONTRAST Additional Contrast? None   Final Result   1. Very distended stomach with retained fluid and food stuffs. 2.  No perforation or definite point of obstruction. 3.  Recommend screening for diabetes. 4.  Normal appendix. RECOMMENDATIONS:   Unavailable         XR CHEST PORTABLE   Final Result   No acute pulmonary process                 ------------------------- NURSING NOTES AND VITALS REVIEWED ---------------------------  Date / Time Roomed:  5/13/2022  8:08 AM  ED Bed Assignment:  07/07    The nursing notes within the ED encounter and vital signs as below have been reviewed.      Patient Vitals for the past 24 hrs:   BP Temp Temp src Pulse Resp SpO2 Height Weight   05/13/22 1411 128/89 -- -- -- -- -- -- --   05/13/22 1400 114/85 98.9 °F (37.2 °C) Oral 138 18 98 % -- --   05/13/22 1104 109/78 -- -- 133 16 98 % -- --   05/13/22 1047 114/83 -- -- 158 -- -- -- --   05/13/22 0927 94/70 -- -- 148 18 97 % -- --   05/13/22 0849 114/81 -- -- 122 20 99 % -- --   05/13/22 0834 -- -- -- -- -- -- 5' 10\" (1.778 m) 130 lb (59 kg)   05/13/22 0802 100/72 96.2 °F (35.7 °C) -- 155 20 97 % -- --       Oxygen Saturation Interpretation: Normal    ------------------------------------------ PROGRESS NOTES ------------------------------------------  Re-evaluation(s):  Time: 151  Patients symptoms are improving  Repeat physical examination is improved    Counseling:  I have spoken with the patient and discussed todays results, in addition to providing specific details for the plan of care and counseling regarding the diagnosis and prognosis. Their questions are answered at this time and they are agreeable with the plan of admission.    --------------------------------- ADDITIONAL PROVIDER NOTES ---------------------------------  Consultations:   Spoke with Dr. Abner Magallanes  Discussed case. They will admit the patient. This patient's ED course included: a personal history and physicial examination, re-evaluation prior to disposition, multiple bedside re-evaluations, IV medications, cardiac monitoring, continuous pulse oximetry and complex medical decision making and emergency management    This patient has remained hemodynamically stable during their ED course. Diagnosis:  1. Acute pancreatitis, unspecified complication status, unspecified pancreatitis type    2. JUDD (acute kidney injury) ([facility] [address]    3. Alcohol withdrawal syndrome without complication ([facility] [address]    4.  Hyperglycemia        Disposition:  Patient's disposition: Admit to CCU/ICU  Patient's condition is critical       Cheyanne Dillon DO  05/13/22 7726

## 2022-05-13 NOTE — LETTER
Ayde Sullivan was seen and treated in our hospital from 05/13/2022-05/15/2022. Patient is ok to return to work without restrictions. If you have any questions or concerns, please don't hesitate to call.       Dr. Muriel Cason

## 2022-05-13 NOTE — CONSULTS
Critical Care Admit/Consult Note         Patient - Eleazar Garcia   MRN -  [id number]   Ann-Marie Fontana # - [de-identified]   - 1985      Date of Admission -  2022  4:12 PM  Date of evaluation -  2022  021/[id number]-A   Hospital Day - 0            ADMIT/CONSULT DETAILS     Reason for Admit/Consult    Acoholic ketosis vs DKA in the setting of alcohol withdrawal, hyperglycemia, elevated beta-hydroxybuterate with elevated anion gap. [address]  Primary Care Physician - Camilla Hein DO     ICU attending - Dr. Wilfrido CAMARGO   The patient is a 29 y.o. male with significant past medical history of acute pancreatitis , chronic alcohol abuse, marijuana use, presents from [facility] ED for management of alcohol withdrawal in the setting of elevated Anion gap (40) and beta-hydroxybutrate and hyperglycemia. Pt states that his last alcoholic beverage was on Monday. He states that he ate some pizza on Tuesday that he believes was spoiled and started vomiting on Wednesday. Sx progressively got worse and he has had increased amounts of vomiting persistently since Thursday. Has not been able to tolerate any p.o. intake. Denies associated fevers, chills, abd pain, diarrhea or constipation.          Awake and following commands: Yes  Current Ventilation: - No ventilator support  Sedation: none  Paralyzed: No  Vasopressors: None    Past Medical History         Diagnosis Date    Benign essential tremor     Tremor         Past Surgical History           Procedure Laterality Date    WISDOM TOOTH EXTRACTION         SOCIAL AND OCCUPATIONAL HEALTH:    Social History       Tobacco History       Smoking Status  Current Some Day Smoker Smoking Frequency  0.25 packs/day Smoking Tobacco Type  Cigarettes      Smokeless Tobacco Use  Never Used                    Current Medications   Current Medications      ondansetron  IV Drips/Infusions     Home Medications  Medications Prior to Admission: PARoxetine (PAXIL) 10 MG tablet, Take 10 mg by mouth every morning    Diet/Nutrition   ADULT DIET; Clear Liquid    Allergies   Cipro xr    Social History   Tobacco   reports that he has been smoking cigarettes. He has been smoking about 0.25 packs per day. He has never used smokeless tobacco.    Alcohol     reports current alcohol use of about 5.0 - 6.0 standard drinks of alcohol per week. Occupational history/exposure? Family History   No family history on file. ROS   Review of Systems   Constitutional: Negative for diaphoresis and fever. HENT: Negative for ear pain, rhinorrhea, sinus pain and sore throat. Eyes: Negative for pain. Respiratory: Negative for cough and shortness of breath. Cardiovascular: Negative for chest pain and leg swelling. Gastrointestinal: Positive for nausea and vomiting. Negative for abdominal pain, blood in stool, constipation and diarrhea. Genitourinary: Negative for dysuria, flank pain and frequency. Musculoskeletal: Negative for back pain, neck pain and neck stiffness. Neurological: Negative for dizziness, weakness, light-headedness and headaches. Psychiatric/Behavioral: Negative for agitation and confusion. Mechanical Ventilation Data   VENT SETTINGS (Comprehensive)          ABG  Lab Results   Component Value Date    HCO3 17.7 2022    O2SAT 97.2 2022     No results found for: IFIO2, MODE, SETTIDVOL, SETPEEP    Vitals    vitals were not taken for this visit. Temperature Range:   Temp  Av.6 °F (36.4 °C)  Min: 96.2 °F (35.7 °C)  Max: 98.9 °F (37.2 °C)  BP Range:  Systolic (61AAZ), EQA:748 , Min:94 , TYD:871     Diastolic (85MOI), ZQZ:91, Min:70, Max:89    Pulse Range: Pulse  Av. 3  Min: 122  Max: 158  Respiration Range: Resp  Av.4  Min: 16  Max: 20  Current Pulse Ox[de-identified]     24HR Pulse Ox Range:  SpO2  Av.8 %  Min: 97 %  Max: 99 %  Oxygen Amount and Delivery:      I/O (24 Hours)  No data found.   No intake or output data in the 24 hours ending 05/13/22 1619  No intake/output data recorded. No data found. Exam   Physical Exam  Vitals reviewed. Constitutional:       General: He is not in acute distress. Appearance: Normal appearance. He is not toxic-appearing. HENT:      Head: Normocephalic and atraumatic. Nose: No congestion or rhinorrhea. Eyes:      General: No scleral icterus. Right eye: No discharge. Left eye: No discharge. Extraocular Movements: Extraocular movements intact. Pupils: Pupils are equal, round, and reactive to light. Cardiovascular:      Rate and Rhythm: Normal rate and regular rhythm. Heart sounds: Normal heart sounds. No murmur heard. No friction rub. No gallop. Pulmonary:      Effort: Pulmonary effort is normal. No respiratory distress. Breath sounds: No wheezing, rhonchi or rales. Abdominal:      General: Abdomen is flat. There is no distension. Tenderness: There is no abdominal tenderness. Musculoskeletal:         General: No deformity or signs of injury. Cervical back: Normal range of motion and neck supple. No rigidity or tenderness. Right lower leg: No edema. Left lower leg: No edema. Skin:     General: Skin is warm and dry. Coloration: Skin is not jaundiced or pale. Neurological:      General: No focal deficit present. Mental Status: He is alert.    Psychiatric:         Mood and Affect: Mood normal.         Data   Old records and images have been reviewed    Lab Results   CBC     Lab Results   Component Value Date    WBC 19.2 05/13/2022    RBC 4.86 05/13/2022    HGB 16.9 05/13/2022    HCT 47.3 05/13/2022     05/13/2022    MCV 97.3 05/13/2022    MCH 34.8 05/13/2022    MCHC 35.7 05/13/2022    RDW 12.4 05/13/2022    LYMPHOPCT 3.8 05/13/2022    MONOPCT 6.6 05/13/2022    BASOPCT 0.2 05/13/2022    MONOSABS 1.26 05/13/2022    LYMPHSABS 0.72 05/13/2022    EOSABS 0.00 05/13/2022    BASOSABS 0.03 05/13/2022       BMP   Lab Results   Component Value Date     05/13/2022    K 4.3 05/13/2022    CL 88 05/13/2022    CO2 10 05/13/2022    BUN 33 05/13/2022    CREATININE 1.7 05/13/2022    GLUCOSE 281 05/13/2022    CALCIUM 9.8 05/13/2022       LFTS  Lab Results   Component Value Date    ALKPHOS 84 05/13/2022    ALT 71 05/13/2022    AST 59 05/13/2022    PROT 8.4 05/13/2022    BILITOT 0.9 05/13/2022    LABALBU 5.5 05/13/2022       INR  Recent Labs     05/13/22  0820   PROTIME 10.2   INR 0.9       APTT  No results for input(s): APTT in the last 72 hours. Lactic Acid  Lab Results   Component Value Date    LACTA 1.1 05/13/2022    LACTA 2.9 05/13/2022        BNP   No results for input(s): BNP in the last 72 hours. Cultures     No results for input(s): BC in the last 72 hours. No results for input(s): Ximena Alpers in the last 72 hours. No results for input(s): LABURIN in the last 72 hours. Radiology   None      SYSTEMS ASSESSMENT    Neuro  Awake, alert and following commands   No weakness noted on exam  Alcohol withdrawal-CIWA protocols with ativan PRN     Respiratory  On room air   Bilateral breath sounds   Not requiring any mechanical ventilation or oxygenation     Cardiovascular  Regular rate and rhythm     GI  Nausea and vomiting   Elevated lipase. Hx of acute pancreatitis. No epigastric abd pain on exam at this time. Will continue to monitor   IV fluids   Zofran prn     Renal  JUDD likely secondary to dehydration   IV fluids     Infectious disease  Nausea and vomiting may be secondary to food borne illness   Supportive care     Heme/Onc  Stable HH     Endocrine  Hyperglycemia with elevated beta-hydroxybuterate and hyperglycemia with elevated anion gap. Alcoholic ketosis vs DKA   IV fluids   Thiamine and folate   Repeat CMP and insulin PRN   Leukocytosis could be reactive secondary to vomiting. Will continue to monitor. Social/Spiritual/DNR/Other  Code status: Full code  Diet ADULT DIET;  Clear Liquid  Stress ulcer prophylaxis:   DVT prophylaxis: pharmacologic prophylaxis (with the following: enoxaparin)  Consultations needed: No  Transfer out of ICU today? No    Lines/catheters  Date 05/13  Peripheral IV right Edita Durham 894, DO  4:19 PM  05/13/22      I personally saw, examined and provided care for the patient. Radiographs, labs and medication list were reviewed by me independently. I spoke with bedside nursing, therapists and consultants. Critical care services and times documented are independent of procedures and multidisciplinary rounds with Residents. Additionally comprehensive, multidisciplinary rounds were conducted with the MICU team. The case was discussed in detail and plans for care were established. Review of Residents documentation was conducted and revisions were made as appropriate. I agree with the above documented exam, problem list and plan of care.    Ascencion Glez MD   CCT excluding procedures :28'

## 2022-05-13 NOTE — LETTER
Quintin Fernández was seen and treated in our emergency department on 5/13/2022. He may return to work on [email or web address]. [unfilled]     If you have any questions or concerns, please don't hesitate to call.       [unfilled]

## 2022-05-14 PROBLEM — E43 SEVERE PROTEIN-CALORIE MALNUTRITION (HCC): Status: ACTIVE | Noted: 2022-05-14

## 2022-05-14 LAB
ALBUMIN SERPL-MCNC: 3.6 G/DL (ref 3.5–5.2)
ALP BLD-CCNC: 47 U/L (ref 40–129)
ALT SERPL-CCNC: 33 U/L (ref 0–40)
ANION GAP SERPL CALCULATED.3IONS-SCNC: 6 MMOL/L (ref 7–16)
AST SERPL-CCNC: 28 U/L (ref 0–39)
BASOPHILS ABSOLUTE: 0.01 E9/L (ref 0–0.2)
BASOPHILS RELATIVE PERCENT: 0.1 % (ref 0–2)
BILIRUB SERPL-MCNC: 0.6 MG/DL (ref 0–1.2)
BUN BLDV-MCNC: 30 MG/DL (ref 6–20)
CALCIUM SERPL-MCNC: 8.7 MG/DL (ref 8.6–10.2)
CHLORIDE BLD-SCNC: 110 MMOL/L (ref 98–107)
CO2: 25 MMOL/L (ref 22–29)
CREAT SERPL-MCNC: 0.6 MG/DL (ref 0.7–1.2)
EOSINOPHILS ABSOLUTE: 0.01 E9/L (ref 0.05–0.5)
EOSINOPHILS RELATIVE PERCENT: 0.1 % (ref 0–6)
GFR AFRICAN AMERICAN: >60
GFR NON-AFRICAN AMERICAN: >60 ML/MIN/1.73
GLUCOSE BLD-MCNC: 108 MG/DL (ref 74–99)
HBA1C MFR BLD: 4.5 % (ref 4–5.6)
HCT VFR BLD CALC: 27.5 % (ref 37–54)
HEMOGLOBIN: 9.5 G/DL (ref 12.5–16.5)
IMMATURE GRANULOCYTES #: 0.05 E9/L
IMMATURE GRANULOCYTES %: 0.5 % (ref 0–5)
LIPASE: 298 U/L (ref 13–60)
LYMPHOCYTES ABSOLUTE: 1.43 E9/L (ref 1.5–4)
LYMPHOCYTES RELATIVE PERCENT: 13.1 % (ref 20–42)
MAGNESIUM: 2.3 MG/DL (ref 1.6–2.6)
MCH RBC QN AUTO: 34.3 PG (ref 26–35)
MCHC RBC AUTO-ENTMCNC: 34.5 % (ref 32–34.5)
MCV RBC AUTO: 99.3 FL (ref 80–99.9)
MONOCYTES ABSOLUTE: 0.81 E9/L (ref 0.1–0.95)
MONOCYTES RELATIVE PERCENT: 7.4 % (ref 2–12)
NEUTROPHILS ABSOLUTE: 8.57 E9/L (ref 1.8–7.3)
NEUTROPHILS RELATIVE PERCENT: 78.8 % (ref 43–80)
PDW BLD-RTO: 12.8 FL (ref 11.5–15)
PHOSPHORUS: 0.7 MG/DL (ref 2.5–4.5)
PLATELET # BLD: 155 E9/L (ref 130–450)
PMV BLD AUTO: 10.9 FL (ref 7–12)
POTASSIUM SERPL-SCNC: 3.5 MMOL/L (ref 3.5–5)
RBC # BLD: 2.77 E12/L (ref 3.8–5.8)
SODIUM BLD-SCNC: 141 MMOL/L (ref 132–146)
TOTAL PROTEIN: 5.5 G/DL (ref 6.4–8.3)
WBC # BLD: 10.9 E9/L (ref 4.5–11.5)

## 2022-05-14 PROCEDURE — 2580000003 HC RX 258: Performed by: INTERNAL MEDICINE

## 2022-05-14 PROCEDURE — 85025 COMPLETE CBC W/AUTO DIFF WBC: CPT

## 2022-05-14 PROCEDURE — 83036 HEMOGLOBIN GLYCOSYLATED A1C: CPT

## 2022-05-14 PROCEDURE — 2580000003 HC RX 258: Performed by: STUDENT IN AN ORGANIZED HEALTH CARE EDUCATION/TRAINING PROGRAM

## 2022-05-14 PROCEDURE — 6360000002 HC RX W HCPCS: Performed by: STUDENT IN AN ORGANIZED HEALTH CARE EDUCATION/TRAINING PROGRAM

## 2022-05-14 PROCEDURE — 6360000002 HC RX W HCPCS: Performed by: INTERNAL MEDICINE

## 2022-05-14 PROCEDURE — 6370000000 HC RX 637 (ALT 250 FOR IP): Performed by: INTERNAL MEDICINE

## 2022-05-14 PROCEDURE — 83735 ASSAY OF MAGNESIUM: CPT

## 2022-05-14 PROCEDURE — 80053 COMPREHEN METABOLIC PANEL: CPT

## 2022-05-14 PROCEDURE — 84100 ASSAY OF PHOSPHORUS: CPT

## 2022-05-14 PROCEDURE — 2500000003 HC RX 250 WO HCPCS: Performed by: STUDENT IN AN ORGANIZED HEALTH CARE EDUCATION/TRAINING PROGRAM

## 2022-05-14 PROCEDURE — 36415 COLL VENOUS BLD VENIPUNCTURE: CPT

## 2022-05-14 PROCEDURE — 1200000000 HC SEMI PRIVATE

## 2022-05-14 PROCEDURE — 83690 ASSAY OF LIPASE: CPT

## 2022-05-14 RX ORDER — ENOXAPARIN SODIUM 100 MG/ML
40 INJECTION SUBCUTANEOUS DAILY
Status: DISCONTINUED | OUTPATIENT
Start: 2022-05-14 | End: 2022-05-15 | Stop reason: HOSPADM

## 2022-05-14 RX ORDER — METOCLOPRAMIDE HYDROCHLORIDE 5 MG/ML
5 INJECTION INTRAMUSCULAR; INTRAVENOUS ONCE
Status: COMPLETED | OUTPATIENT
Start: 2022-05-14 | End: 2022-05-14

## 2022-05-14 RX ORDER — PANTOPRAZOLE SODIUM 40 MG/1
40 TABLET, DELAYED RELEASE ORAL
Status: DISCONTINUED | OUTPATIENT
Start: 2022-05-15 | End: 2022-05-15 | Stop reason: HOSPADM

## 2022-05-14 RX ADMIN — SODIUM CHLORIDE, POTASSIUM CHLORIDE, SODIUM LACTATE AND CALCIUM CHLORIDE: 600; 310; 30; 20 INJECTION, SOLUTION INTRAVENOUS at 02:00

## 2022-05-14 RX ADMIN — METOCLOPRAMIDE 5 MG: 5 INJECTION, SOLUTION INTRAMUSCULAR; INTRAVENOUS at 17:22

## 2022-05-14 RX ADMIN — FOLIC ACID 1 MG: 5 INJECTION, SOLUTION INTRAMUSCULAR; INTRAVENOUS; SUBCUTANEOUS at 08:49

## 2022-05-14 RX ADMIN — ENOXAPARIN SODIUM 40 MG: 100 INJECTION SUBCUTANEOUS at 11:17

## 2022-05-14 RX ADMIN — POTASSIUM PHOSPHATE, MONOBASIC AND POTASSIUM PHOSPHATE, DIBASIC 30 MMOL: 224; 236 INJECTION, SOLUTION, CONCENTRATE INTRAVENOUS at 09:30

## 2022-05-14 RX ADMIN — LORAZEPAM 1 MG: 1 TABLET ORAL at 16:25

## 2022-05-14 RX ADMIN — SODIUM CHLORIDE, POTASSIUM CHLORIDE, SODIUM LACTATE AND CALCIUM CHLORIDE: 600; 310; 30; 20 INJECTION, SOLUTION INTRAVENOUS at 12:18

## 2022-05-14 RX ADMIN — PAROXETINE 10 MG: 10 TABLET, FILM COATED ORAL at 08:49

## 2022-05-14 RX ADMIN — THIAMINE HYDROCHLORIDE 100 MG: 100 INJECTION, SOLUTION INTRAMUSCULAR; INTRAVENOUS at 08:49

## 2022-05-14 ASSESSMENT — PAIN SCALES - GENERAL
PAINLEVEL_OUTOF10: 0

## 2022-05-14 ASSESSMENT — ENCOUNTER SYMPTOMS
ABDOMINAL PAIN: 1
PHOTOPHOBIA: 0
ABDOMINAL DISTENTION: 0
COLOR CHANGE: 0
BACK PAIN: 0
NAUSEA: 1
VOMITING: 1
SHORTNESS OF BREATH: 0
CHEST TIGHTNESS: 0
DIARRHEA: 0
WHEEZING: 0

## 2022-05-14 NOTE — H&P
History & Physicial  05/14/22  Primary Care:  Kassie Ugarte, DO  [address] [facility] [address]        No chief complaint on file. HPI:  Patient is a 29year old male who presented to [address] [facility] [address] in [place] due to nausea and vomiting for the last 48-36 hours. Patient states that he drinks a 5th of liquor every other day. He states that he recently tried to stop drinking. His last drink was on Monday. He states Tuesday he ate pizza that he suspected was spoiled and developed vomitting on Wednesday, Thursday and then Friday went to ER. He was found to have a significant anion gap, beta hydroxybutrate, and hyperglycemia. He was transferred to ICU for further care. This am he states his nausea has improved. He has hiccups. Prior to Visit Medications    Medication Sig Taking? Authorizing Provider   PARoxetine (PAXIL) 10 MG tablet Take 10 mg by mouth every morning  Historical Provider, MD     Social History     Tobacco Use    Smoking status: Current Some Day Smoker     Packs/day: 0.50     Types: Cigars    Smokeless tobacco: Never Used    Tobacco comment: Black & Mariela    Vaping Use    Vaping Use: Never used   Substance Use Topics    Alcohol use: Yes     Alcohol/week: 30.0 standard drinks     Types: 30 Shots of liquor per week     Comment: burbon    Drug use: Yes     Types: Marijuana Norval Remak)     Comment: daily     Family History   Problem Relation Age of Onset    Diabetes Mother     Cancer Father     Asthma Sister      Past Surgical History:   Procedure Laterality Date    COLONOSCOPY      WISDOM TOOTH EXTRACTION       Past Medical History:   Diagnosis Date    Benign essential tremor     Tremor      Review of Systems   Constitutional: Positive for activity change, appetite change and fatigue. Negative for chills and fever. HENT: Negative for congestion, ear discharge, ear pain and hearing loss. Eyes: Negative for photophobia and visual disturbance.    Respiratory: Negative for chest tightness, shortness of breath and wheezing. Cardiovascular: Negative for chest pain, palpitations and leg swelling. Gastrointestinal: Positive for abdominal pain, nausea and vomiting. Negative for abdominal distention and diarrhea. Endocrine: Negative for polydipsia, polyphagia and polyuria. Genitourinary: Negative for dysuria, flank pain, frequency and hematuria. Musculoskeletal: Negative for arthralgias, back pain and gait problem. Skin: Negative for color change and pallor. Allergic/Immunologic: Negative for immunocompromised state. Neurological: Positive for tremors. Negative for dizziness, facial asymmetry, light-headedness and numbness. Hematological: Negative for adenopathy. Does not bruise/bleed easily. Psychiatric/Behavioral: Negative for agitation and confusion. Vitals:    05/14/22 0200 05/14/22 0300 05/14/22 0400 05/14/22 0500   BP: 119/77 108/71 120/84 (!) 140/85   Pulse: 113 113 110 110   Resp: 19 17 16 17   Temp: 98.3 °F (36.8 °C)  98.4 °F (36.9 °C)    TempSrc: Oral  Oral    SpO2: 99%  98%    Weight:       Height:           Physical Exam  Constitutional:       General: He is not in acute distress. Appearance: Normal appearance. HENT:      Head: Normocephalic and atraumatic. Right Ear: External ear normal.      Left Ear: External ear normal.      Nose: Nose normal. No congestion. Mouth/Throat:      Mouth: Mucous membranes are moist.      Pharynx: Oropharynx is clear. No oropharyngeal exudate. Eyes:      General:         Right eye: No discharge. Left eye: No discharge. Extraocular Movements: Extraocular movements intact. Conjunctiva/sclera: Conjunctivae normal.      Pupils: Pupils are equal, round, and reactive to light. Cardiovascular:      Rate and Rhythm: Regular rhythm. Tachycardia present. Pulses: Normal pulses. Heart sounds: No gallop. Pulmonary:      Effort: Pulmonary effort is normal. No respiratory distress. Breath sounds: Normal breath sounds. No wheezing, rhonchi or rales. Abdominal:      General: Bowel sounds are normal. There is no distension. Palpations: Abdomen is soft. Tenderness: There is no abdominal tenderness. There is no guarding or rebound. Musculoskeletal:         General: Normal range of motion. Cervical back: Normal range of motion and neck supple. Right lower leg: No edema. Skin:     General: Skin is warm. Capillary Refill: Capillary refill takes less than 2 seconds. Coloration: Skin is not jaundiced. Neurological:      General: No focal deficit present. Mental Status: He is alert and oriented to person, place, and time. Cranial Nerves: No cranial nerve deficit. Psychiatric:         Mood and Affect: Mood normal.         Principal Problem:    Idiopathic acute pancreatitis  Resolved Problems:    * No resolved hospital problems. *  1. Anion gap metabolic acidosis   2. Alcoholic ketosis   3. Alcohol Dependency  4. Tobacco abuse   5. Elevated lipase   6. JUDD   7. Anxiety   8. Hypophosphatemia     Plan:  Continue on IVF and supplement thiamine and phosphorus. Continue on antiemetics. Continue CIWA scale. Appreciate critical care input.      DVT Prophylaxis: PCDs  Code Status: Full     Louis Guerrero DO    Electronically signed by Louis Guerrero DO on 5/14/2022 at 7:38 AM

## 2022-05-14 NOTE — PLAN OF CARE
Problem: Discharge Planning  Goal: Discharge to home or other facility with appropriate resources  Outcome: Progressing  Flowsheets (Taken 5/13/2022 1930)  Discharge to home or other facility with appropriate resources: Identify barriers to discharge with patient and caregiver     Problem: ABCDS Injury Assessment  Goal: Absence of physical injury  Outcome: Progressing  Flowsheets (Taken 5/14/2022 0000)  Absence of Physical Injury: Implement safety measures based on patient assessment

## 2022-05-14 NOTE — PLAN OF CARE
Problem: Discharge Planning  Goal: Discharge to home or other facility with appropriate resources  5/14/2022 1139 by Trevon Prasad RN  Outcome: Progressing     Problem: ABCDS Injury Assessment  Goal: Absence of physical injury  5/14/2022 1139 by Trevon Prasad RN  Outcome: Progressing     Problem: Nutrition Deficit:  Goal: Optimize nutritional status  Outcome: Progressing

## 2022-05-14 NOTE — PROGRESS NOTES
Critical Care Team - Daily Progress Note      Date and time: 2022 10:44 AM  Patient's name:  Soledad Wilson  Medical Record Number: [id number]  Patient's account/billing number: [de-identified]  Patient's YOB: 1985  Age: 29 y.o. Date of Admission: 2022  4:12 PM  Length of stay during current admission: 1      Primary Care Physician: Nasreen Jim DO  ICU Attending Physician: Dr. Brennan Eubanks    Code Status: Full Code    Reason for ICU admission: Anion gap metabolic acidosis with elevated behydroxybuterate in the setting of hyperglycemia and alcohol withdrawal      SUBJECTIVE:     OVERNIGHT EVENTS:       1 mg Ativan given overnight per [facility] protocols. Pt continued to denying abd pain and nausea. Tolerating liquid diet. Intake/Output:   I/O this shift:  In: 100 [P.O.:100]  Out: -   I/O last 3 completed shifts: In: 1171.5 [I.V.:920.5; IV Piggyback:251]  Out: 550 [Urine:550]    Awake and following commands: Yes  Current Ventilation: - No ventilator support  Sedation: none  Paralyzed: No  Vasopressors: None    Initial HPI + past overnight events: 30 YO male presetned to [facility] ED with complaints of abd pain, nausea and vomiting. Pt reported decreased P.O. intake. Admitted to hx of daily alcohol consumption. Lab work was significant for Anion gap metabolic acidosis with elevated behydroxybuterate in the setting of hyperglycemia and alcohol withdrawal. Pt had no reported hx of diabetes. Admitted to the ICU for management.        OBJECTIVE:     VITAL SIGNS:  /88   Pulse 97   Temp 98.4 °F (36.9 °C) (Oral)   Resp 14   Ht 5' 10\" (1.778 m)   Wt 121 lb (54.9 kg)   SpO2 98%   BMI 17.36 kg/m²   Tmax over 24 hours:  Temp (24hrs), Av.5 °F (36.9 °C), Min:98.3 °F (36.8 °C), Max:98.9 °F (37.2 °C)      Patient Vitals for the past 6 hrs:   BP Temp Temp src Pulse Resp SpO2 Height Weight   22 1000 129/88 -- -- 97 14 98 % -- --   22 0908 -- -- -- -- -- -- 5' 10\" (1.778 m) -- 05/14/22 0900 (!) 120/90 -- -- 97 30 99 % -- 121 lb (54.9 kg)   05/14/22 0800 -- -- -- 136 -- -- -- --   05/14/22 0700 124/83 98.4 °F (36.9 °C) Oral 99 26 -- -- --   05/14/22 0500 (!) 140/85 -- -- 110 17 -- -- --         Intake/Output Summary (Last 24 hours) at 5/14/2022 1044  Last data filed at 5/14/2022 0830  Gross per 24 hour   Intake 1271.5 ml   Output 550 ml   Net 721.5 ml     Wt Readings from Last 2 Encounters:   05/14/22 121 lb (54.9 kg)   05/13/22 130 lb (59 kg)     Body mass index is 17.36 kg/m². Physical Exam  Vitals reviewed. Constitutional:       General: He is not in acute distress. Appearance: Normal appearance. He is not toxic-appearing. HENT:      Head: Normocephalic and atraumatic. Nose: No congestion or rhinorrhea. Eyes:      General: No scleral icterus. Right eye: No discharge. Left eye: No discharge. Extraocular Movements: Extraocular movements intact. Pupils: Pupils are equal, round, and reactive to light. Cardiovascular:      Rate and Rhythm: Normal rate and regular rhythm. Heart sounds: Normal heart sounds. No murmur heard. No friction rub. No gallop. Pulmonary:      Effort: Pulmonary effort is normal. No respiratory distress. Breath sounds: No wheezing, rhonchi or rales. Abdominal:      General: Abdomen is flat. There is no distension. Tenderness: There is no abdominal tenderness. Musculoskeletal:         General: No deformity or signs of injury. Cervical back: Normal range of motion and neck supple. No rigidity or tenderness. Right lower leg: No edema. Left lower leg: No edema. Skin:     General: Skin is warm and dry. Coloration: Skin is not jaundiced or pale. Neurological:      General: No focal deficit present. Mental Status: He is alert.    Psychiatric:         Mood and Affect: Mood normal.           MEDICATIONS:    Scheduled Meds:   potassium phosphate IVPB  30 mmol IntraVENous Once    [START ON 5/15/2022] pantoprazole  40 mg Oral QAM AC    enoxaparin  40 mg SubCUTAneous Daily    sodium chloride flush  5-40 mL IntraVENous 2 times per day    thiamine  100 mg IntraVENous Daily    folic acid  1 mg IntraVENous Daily    PARoxetine  10 mg Oral QAM     Continuous Infusions:   sodium chloride      lactated ringers 100 mL/hr at 05/14/22 0500     PRN Meds:   ondansetron, 4 mg, Q6H PRN  sodium chloride flush, 5-40 mL, PRN  sodium chloride, , PRN  LORazepam, 1 mg, Q1H PRN   Or  LORazepam, 1 mg, Q1H PRN   Or  LORazepam, 2 mg, Q1H PRN   Or  LORazepam, 2 mg, Q1H PRN   Or  LORazepam, 3 mg, Q1H PRN   Or  LORazepam, 3 mg, Q1H PRN   Or  LORazepam, 4 mg, Q1H PRN   Or  LORazepam, 4 mg, Q1H PRN  sodium phosphate IVPB, 0.16 mmol/kg, PRN   Or  sodium phosphate IVPB, 0.32 mmol/kg, PRN  potassium chloride, 40 mEq, PRN   Or  potassium alternative oral replacement, 40 mEq, PRN   Or  potassium chloride, 10 mEq, PRN  magnesium sulfate, 1,000 mg, PRN          VENT SETTINGS (Comprehensive) (if applicable): Additional Respiratory Assessments  Pulse: 97  Resp: 14  SpO2: 98 %    Arterial Blood Gas 5/14/2022  None.       Laboratory findings:    Complete Blood Count:   Recent Labs     05/13/22  0820 05/13/22  1620 05/14/22  0531   WBC 19.2* 15.3* 10.9   HGB 16.9* 12.1* 9.5*   HCT 47.3 34.5* 27.5*    220 155        Last 3 Blood Glucose:   Recent Labs     05/13/22  0820 05/13/22  1620 05/14/22  0531   GLUCOSE 281* 170* 108*        PT/INR:    Lab Results   Component Value Date    PROTIME 10.2 05/13/2022    INR 0.9 05/13/2022     PTT:  No results found for: APTT, PTT    Comprehensive Metabolic Profile:   Recent Labs     05/13/22  0820 05/13/22  0820 05/13/22  1620 05/13/22  1620 05/14/22  0531     --  140  --  141   K 4.3  --  4.4  --  3.5   CL 88*  --  105  --  110*   CO2 10*  --  19*  --  25   BUN 33*  --  33*  --  30*   CREATININE 1.7*  --  0.8  --  0.6*   GLUCOSE 281*  --  170*  --  108*   CALCIUM 9.8  --  8.5* --  8.7   PROT 8.4*   < > 6.3*   < > 5.5*   LABALBU 5.5*   < > 4.1   < > 3.6   BILITOT 0.9   < > 0.6   < > 0.6   ALKPHOS 84   < > 57   < > 47   AST 59*   < > 40*   < > 28   ALT 71*   < > 44*   < > 33    < > = values in this interval not displayed. Magnesium:   Lab Results   Component Value Date    MG 2.3 05/14/2022     Phosphorus:   Lab Results   Component Value Date    PHOS 0.7 05/14/2022     Ionized Calcium: No results found for: CAION     Urinalysis:     Troponin: No results for input(s): TROPONINI in the last 72 hours. Microbiology:  Cultures drawn: No    Radiology/Imaging:     Chest Xray (5/14/2022):  N/A    ASSESSMENT:     Patient Active Problem List    Diagnosis Date Noted    Severe protein-calorie malnutrition ([facility] [address] 05/14/2022    Idiopathic acute pancreatitis 05/13/2022         PLAN:     Neuro  · Awake, alert and following commands   · No weakness noted on exam  · Alcohol withdrawal-CIWA protocols with ativan PRN      Respiratory  · On room air   · Bilateral breath sounds   · Not requiring any mechanical ventilation or oxygenation      Cardiovascular  · Regular rate and rhythm      GI  · Nausea and vomiting   · Elevated lipase. Hx of acute pancreatitis. No epigastric abd pain on exam at this time. Will continue to monitor   · Lipase levels improving and pt is denying any abd pain  · Tolerating liquid diet. · IV fluids   · Zofran prn      Renal  · JUDD likely secondary to dehydration   · IV fluids      Infectious disease  · Nausea and vomiting may be secondary to food borne illness   · Supportive care      Heme/Onc  · Stable HH      Endocrine  · Initially pt had hyperglycemia with elevated beta-hydroxybuterate and hyperglycemia with elevated anion gap. · Alcoholic ketosis vs DKA   · Repeat labs show a closed gap with resolved hyperglycemia. Initial presentation was likely secondary to alcoholic ketosis.    · IV fluids   · Thiamine and folate  · Leukocytosis improved and was likely reactive

## 2022-05-14 NOTE — PROGRESS NOTES
Comprehensive Nutrition Assessment    Type and Reason for Visit:  Initial,Positive Nutrition Screen    Nutrition Recommendations/Plan:   1. Continue current diet, ADAT  2. Will add Ensure Clear BID  3. Will add Gelatein daily     Malnutrition Assessment:  Malnutrition Status:  Severe malnutrition (05/14/22 0915)    Context:  Chronic Illness     Findings of the 6 clinical characteristics of malnutrition:  Energy Intake:  75% or less estimated energy requirements for 1 month or longer  Weight Loss:  Unable to assess (d/t lack of wt hx)     Body Fat Loss:  Severe body fat loss     Muscle Mass Loss:  Severe muscle mass loss    Fluid Accumulation:  No significant fluid accumulation     Strength:  Not Performed    Nutrition Assessment:    Pt admits w/ acute pancreatitis, note ETOH ketosis vs DKA. Hx polysubstance abuse. Pt w/ severe malnutrition currently on clear liquid diet, ADAT and will add ONS TID. Nutrition Related Findings:    A&O X4, I&Os WDL, no edema, abd soft/flat, +BS, Phos 0.7, N/V PTA- none noted at this time Wound Type: None       Current Nutrition Intake & Therapies:    Average Meal Intake: Unable to assess  Average Supplements Intake: None Ordered  ADULT DIET; Clear Liquid    Anthropometric Measures:  Height: 5' 10\" (177.8 cm)  Ideal Body Weight (IBW): 166 lbs (75 kg)    Admission Body Weight: 123 lb (55.8 kg) (5/13 bed)  Current Body Weight: 121 lb (54.9 kg) (5/14 bed per RD), 72.9 % IBW.     Current BMI (kg/m2): 17.4  Usual Body Weight:  (no actual wt hx per EMR)     Weight Adjustment For: No Adjustment                 BMI Categories: Underweight (BMI less than 18.5)    Estimated Daily Nutrient Needs:  Energy Requirements Based On: Kcal/kg  Weight Used for Energy Requirements: Current  Energy (kcal/day):   Weight Used for Protein Requirements: Current  Protein (g/day): 80-90 (1.5-1.7)     Fluid (ml/day): per critical care    Nutrition Diagnosis:   · Severe malnutrition,In context of chronic illness related to inadequate protein-energy intake (w/ hx polysubstance abuse) as evidenced by Criteria as identified in malnutrition assessment    Nutrition Interventions:   Food and/or Nutrient Delivery: Continue Current Diet,Start Oral Nutrition Supplement (ADAT)  Nutrition Education/Counseling: Education not indicated  Coordination of Nutrition Care: Continue to monitor while inpatient       Goals:     Goals: other (specify)  Specify Other Goals: nutrition progression    Nutrition Monitoring and Evaluation:      Food/Nutrient Intake Outcomes: Food and Nutrient Intake,Supplement Intake  Physical Signs/Symptoms Outcomes: Biochemical Data,GI Status,Fluid Status or Edema,Nutrition Focused Physical Findings,Skin,Weight    Discharge Planning:     Too soon to determine     Srinivasan Clark RD, LD  Contact: [id number]

## 2022-05-14 NOTE — PATIENT CARE CONFERENCE
Intensive Care Daily Quality Rounding Checklist      ICU Team Members: Dr. Yaya Cruz; Residents; Charge nurse; Bedside nurse; Clinical pharmacist      ICU Day #: 2    Intubation Date:     Ventilator Day #:     Central Line Insertion Date:         Day #:      Arterial Line Insertion Date:     Day #:     Temporary Hemodialysis Catheter Insertion Date:       Day #     DVT Prophylaxis:     GI Prophylaxis: PO diet    Ricks Catheter Insertion Date:     Day #:       Continued need (if yes, reason documented and discussed with physician):     Skin Issues/ Wounds and ordered treatment discussed on rounds: No    Goals/ Plans for the Day: Daily labs and replace/transfuse as needed. CIWA as indicated. Advance diet as tolerated. Transfer to [facility].

## 2022-05-15 VITALS
HEIGHT: 70 IN | SYSTOLIC BLOOD PRESSURE: 128 MMHG | OXYGEN SATURATION: 99 % | RESPIRATION RATE: 18 BRPM | BODY MASS INDEX: 18.32 KG/M2 | HEART RATE: 96 BPM | DIASTOLIC BLOOD PRESSURE: 83 MMHG | WEIGHT: 128 LBS | TEMPERATURE: 98.9 F

## 2022-05-15 PROBLEM — K85.00 IDIOPATHIC ACUTE PANCREATITIS: Status: RESOLVED | Noted: 2022-05-13 | Resolved: 2022-05-15

## 2022-05-15 LAB
ALBUMIN SERPL-MCNC: 3.7 G/DL (ref 3.5–5.2)
ALP BLD-CCNC: 47 U/L (ref 40–129)
ALT SERPL-CCNC: 50 U/L (ref 0–40)
ANION GAP SERPL CALCULATED.3IONS-SCNC: 10 MMOL/L (ref 7–16)
AST SERPL-CCNC: 102 U/L (ref 0–39)
BASOPHILS ABSOLUTE: 0.02 E9/L (ref 0–0.2)
BASOPHILS RELATIVE PERCENT: 0.3 % (ref 0–2)
BILIRUB SERPL-MCNC: 0.9 MG/DL (ref 0–1.2)
BUN BLDV-MCNC: 10 MG/DL (ref 6–20)
CALCIUM SERPL-MCNC: 8.6 MG/DL (ref 8.6–10.2)
CHLORIDE BLD-SCNC: 101 MMOL/L (ref 98–107)
CO2: 27 MMOL/L (ref 22–29)
CREAT SERPL-MCNC: 0.5 MG/DL (ref 0.7–1.2)
EOSINOPHILS ABSOLUTE: 0.12 E9/L (ref 0.05–0.5)
EOSINOPHILS RELATIVE PERCENT: 1.9 % (ref 0–6)
GFR AFRICAN AMERICAN: >60
GFR NON-AFRICAN AMERICAN: >60 ML/MIN/1.73
GLUCOSE BLD-MCNC: 98 MG/DL (ref 74–99)
HCT VFR BLD CALC: 25.4 % (ref 37–54)
HEMOGLOBIN: 9 G/DL (ref 12.5–16.5)
IMMATURE GRANULOCYTES #: 0.02 E9/L
IMMATURE GRANULOCYTES %: 0.3 % (ref 0–5)
LIPASE: 220 U/L (ref 13–60)
LYMPHOCYTES ABSOLUTE: 1.85 E9/L (ref 1.5–4)
LYMPHOCYTES RELATIVE PERCENT: 29.4 % (ref 20–42)
MAGNESIUM: 1.9 MG/DL (ref 1.6–2.6)
MCH RBC QN AUTO: 35 PG (ref 26–35)
MCHC RBC AUTO-ENTMCNC: 35.4 % (ref 32–34.5)
MCV RBC AUTO: 98.8 FL (ref 80–99.9)
MONOCYTES ABSOLUTE: 0.43 E9/L (ref 0.1–0.95)
MONOCYTES RELATIVE PERCENT: 6.8 % (ref 2–12)
MRSA CULTURE ONLY: NORMAL
NEUTROPHILS ABSOLUTE: 3.86 E9/L (ref 1.8–7.3)
NEUTROPHILS RELATIVE PERCENT: 61.3 % (ref 43–80)
PDW BLD-RTO: 12.2 FL (ref 11.5–15)
PHOSPHORUS: 2.6 MG/DL (ref 2.5–4.5)
PLATELET # BLD: 141 E9/L (ref 130–450)
PMV BLD AUTO: 11.8 FL (ref 7–12)
POTASSIUM SERPL-SCNC: 3.1 MMOL/L (ref 3.5–5)
RBC # BLD: 2.57 E12/L (ref 3.8–5.8)
SODIUM BLD-SCNC: 138 MMOL/L (ref 132–146)
TOTAL PROTEIN: 5.6 G/DL (ref 6.4–8.3)
WBC # BLD: 6.3 E9/L (ref 4.5–11.5)

## 2022-05-15 PROCEDURE — 36415 COLL VENOUS BLD VENIPUNCTURE: CPT

## 2022-05-15 PROCEDURE — 83690 ASSAY OF LIPASE: CPT

## 2022-05-15 PROCEDURE — 6360000002 HC RX W HCPCS: Performed by: INTERNAL MEDICINE

## 2022-05-15 PROCEDURE — 85025 COMPLETE CBC W/AUTO DIFF WBC: CPT

## 2022-05-15 PROCEDURE — 83735 ASSAY OF MAGNESIUM: CPT

## 2022-05-15 PROCEDURE — 6370000000 HC RX 637 (ALT 250 FOR IP): Performed by: INTERNAL MEDICINE

## 2022-05-15 PROCEDURE — 84100 ASSAY OF PHOSPHORUS: CPT

## 2022-05-15 PROCEDURE — 93005 ELECTROCARDIOGRAM TRACING: CPT | Performed by: INTERNAL MEDICINE

## 2022-05-15 PROCEDURE — 2500000003 HC RX 250 WO HCPCS: Performed by: INTERNAL MEDICINE

## 2022-05-15 PROCEDURE — 80053 COMPREHEN METABOLIC PANEL: CPT

## 2022-05-15 RX ORDER — FOLIC ACID 1 MG/1
1 TABLET ORAL DAILY
Qty: 30 TABLET | Refills: 5 | Status: SHIPPED | OUTPATIENT
Start: 2022-05-15

## 2022-05-15 RX ORDER — LANOLIN ALCOHOL/MO/W.PET/CERES
100 CREAM (GRAM) TOPICAL DAILY
Qty: 30 TABLET | Refills: 3 | Status: SHIPPED | OUTPATIENT
Start: 2022-05-15 | End: 2022-05-15 | Stop reason: SDUPTHER

## 2022-05-15 RX ORDER — PANTOPRAZOLE SODIUM 40 MG/1
40 TABLET, DELAYED RELEASE ORAL
Qty: 30 TABLET | Refills: 3 | Status: SHIPPED | OUTPATIENT
Start: 2022-05-16 | End: 2022-05-15

## 2022-05-15 RX ORDER — FOLIC ACID 1 MG/1
1 TABLET ORAL DAILY
Qty: 30 TABLET | Refills: 5 | Status: SHIPPED | OUTPATIENT
Start: 2022-05-15 | End: 2022-05-15 | Stop reason: SDUPTHER

## 2022-05-15 RX ORDER — BACLOFEN 10 MG/1
10 TABLET ORAL 3 TIMES DAILY
Qty: 10 TABLET | Refills: 0 | Status: SHIPPED | OUTPATIENT
Start: 2022-05-15

## 2022-05-15 RX ORDER — LANOLIN ALCOHOL/MO/W.PET/CERES
100 CREAM (GRAM) TOPICAL DAILY
Qty: 30 TABLET | Refills: 3 | Status: SHIPPED | OUTPATIENT
Start: 2022-05-15

## 2022-05-15 RX ORDER — BACLOFEN 10 MG/1
10 TABLET ORAL 3 TIMES DAILY
Qty: 10 TABLET | Refills: 0 | Status: SHIPPED | OUTPATIENT
Start: 2022-05-15 | End: 2022-05-15

## 2022-05-15 RX ORDER — BACLOFEN 10 MG/1
10 TABLET ORAL 3 TIMES DAILY
Status: DISCONTINUED | OUTPATIENT
Start: 2022-05-15 | End: 2022-05-15 | Stop reason: HOSPADM

## 2022-05-15 RX ORDER — PANTOPRAZOLE SODIUM 40 MG/1
40 TABLET, DELAYED RELEASE ORAL
Qty: 30 TABLET | Refills: 3 | Status: SHIPPED | OUTPATIENT
Start: 2022-05-16

## 2022-05-15 RX ADMIN — THIAMINE HYDROCHLORIDE 100 MG: 100 INJECTION, SOLUTION INTRAMUSCULAR; INTRAVENOUS at 08:34

## 2022-05-15 RX ADMIN — BACLOFEN 10 MG: 10 TABLET ORAL at 09:17

## 2022-05-15 RX ADMIN — PAROXETINE 10 MG: 10 TABLET, FILM COATED ORAL at 08:33

## 2022-05-15 RX ADMIN — FOLIC ACID 1 MG: 5 INJECTION, SOLUTION INTRAMUSCULAR; INTRAVENOUS; SUBCUTANEOUS at 09:17

## 2022-05-15 RX ADMIN — POTASSIUM CHLORIDE 40 MEQ: 1500 TABLET, EXTENDED RELEASE ORAL at 09:20

## 2022-05-15 RX ADMIN — LORAZEPAM 1 MG: 1 TABLET ORAL at 08:33

## 2022-05-15 RX ADMIN — ENOXAPARIN SODIUM 40 MG: 100 INJECTION SUBCUTANEOUS at 08:34

## 2022-05-15 RX ADMIN — PANTOPRAZOLE SODIUM 40 MG: 40 TABLET, DELAYED RELEASE ORAL at 05:24

## 2022-05-15 ASSESSMENT — PAIN SCALES - GENERAL: PAINLEVEL_OUTOF10: 0

## 2022-05-15 ASSESSMENT — ENCOUNTER SYMPTOMS
VOMITING: 0
NAUSEA: 0
SHORTNESS OF BREATH: 0
DIARRHEA: 0
COUGH: 0

## 2022-05-15 NOTE — CARE COORDINATION
Met w/ patient. Explained role of  and plan of care. Lives w/ his Dolly Boyle in a 1 story house- 3 steps to entrance. Independent PTA- pt works. Hx ETOH-states he does not drink daily but when he does, it is excessively-Hx [place] [facility]. PCP is Dr. Deonna Solis and [facility]. Per pt, states he does not want to go to alcohol rehab- plans to return home w/ his cristobal on discharge. Addiction recovery resource list given to patient. Agreeable to speak w/ [facility]- VM left w/ Kelly Grace @ [facility] 1004.  Will follow Coco Rivas RN case manager    0900: Received return call from [facility] ([facility])- she will see patient this afternoon Coco Rivas RN case manager

## 2022-05-15 NOTE — PROGRESS NOTES
Progress Note  Date:5/15/2022       Room:0505/0505-A  Patient Name:Michelet Anthony     YOB: 1985     Age:36 y.o. Patient is seen for follow up of alcoholic ketosis. Patient this am states he continues to have hiccups. He states its driving him crazy. He states he has been tolerating diet. He denies any further nausea or vomiting. Subjective    Subjective:  Symptoms:  No shortness of breath, cough, chest pain, headache, chest pressure or diarrhea. Diet:  No nausea or vomiting. Review of Systems   Respiratory: Negative for cough and shortness of breath. Cardiovascular: Negative for chest pain. Gastrointestinal: Negative for diarrhea, nausea and vomiting. Objective         Vitals Last 24 Hours:  TEMPERATURE:  Temp  Av.3 °F (36.8 °C)  Min: 98 °F (36.7 °C)  Max: 98.6 °F (37 °C)  RESPIRATIONS RANGE: Resp  Av.6  Min: 14  Max: 30  PULSE OXIMETRY RANGE: SpO2  Av.8 %  Min: 98 %  Max: 99 %  PULSE RANGE: Pulse  Av.8  Min: 97  Max: 136  BLOOD PRESSURE RANGE: Systolic (57QML), IX , Min:116 , IWI:013   ; Diastolic (19HTF), WLX:96, Min:67, Max:90    I/O (24Hr): Intake/Output Summary (Last 24 hours) at 5/15/2022 0740  Last data filed at 5/15/2022 0505  Gross per 24 hour   Intake 3254.45 ml   Output --   Net 3254.45 ml     Objective:  General Appearance:  Comfortable, well-appearing and in no acute distress. Vital signs: (most recent): Blood pressure 120/67, pulse 98, temperature 98 °F (36.7 °C), temperature source Oral, resp. rate 18, height 5' 10\" (1.778 m), weight 128 lb (58.1 kg), SpO2 99 %. Lungs:  Normal effort and normal respiratory rate. Breath sounds clear to auscultation. No rales or rhonchi. Heart: Normal rate. Regular rhythm. S1 normal and S2 normal.  No friction rub. Abdomen: Abdomen is soft and non-distended. Bowel sounds are normal.   There is no rebound tenderness. There is no guarding. Extremities: Normal range of motion.   There is no dependent edema. Skin:  Warm and dry. Labs/Imaging/Diagnostics    Labs:  CBC:  Recent Labs     05/13/22  0820 05/13/22  1620 05/14/22  0531   WBC 19.2* 15.3* 10.9   RBC 4.86 3.44* 2.77*   HGB 16.9* 12.1* 9.5*   HCT 47.3 34.5* 27.5*   MCV 97.3 100.3* 99.3   RDW 12.4 12.7 12.8    220 155     CHEMISTRIES:  Recent Labs     05/13/22  0820 05/13/22  1620 05/14/22  0531    140 141   K 4.3 4.4 3.5   CL 88* 105 110*   CO2 10* 19* 25   BUN 33* 33* 30*   CREATININE 1.7* 0.8 0.6*   GLUCOSE 281* 170* 108*   PHOS  --  0.6* 0.7*   MG  --  2.2 2.3     PT/INR:  Recent Labs     05/13/22  0820   PROTIME 10.2   INR 0.9     APTT:No results for input(s): APTT in the last 72 hours. LIVER PROFILE:  Recent Labs     05/13/22  0820 05/13/22  1620 05/14/22  0531   AST 59* 40* 28   ALT 71* 44* 33   BILITOT 0.9 0.6 0.6   ALKPHOS 84 57 47       Imaging Last 24 Hours:  CT ABDOMEN PELVIS WO CONTRAST Additional Contrast? None    Result Date: 5/13/2022  EXAMINATION: CT OF THE ABDOMEN AND PELVIS WITHOUT CONTRAST 5/13/2022 8:34 am TECHNIQUE: CT of the abdomen and pelvis was performed without the administration of intravenous contrast. Multiplanar reformatted images are provided for review. Automated exposure control, iterative reconstruction, and/or weight based adjustment of the mA/kV was utilized to reduce the radiation dose to as low as reasonably achievable. Dose is total .14 M Gy cm. COMPARISON: CT abdomen and pelvis with contrast 04/24/2010. HISTORY: ORDERING SYSTEM PROVIDED HISTORY: Abdominal pain, tachycardia, leukocytosis TECHNOLOGIST PROVIDED HISTORY: Reason for exam:->Abdominal pain, tachycardia, leukocytosis Additional Contrast?->None Decision Support Exception - unselect if not a suspected or confirmed emergency medical condition->Emergency Medical Condition (MA) FINDINGS: Lower Chest: Lung bases are clear. No effusion. Heart size normal.  No hiatal hernia. Organs: Liver: Liver length 14.6 cm.   Normal size.  No obvious liver masses or bile duct dilatation. Liver margins are smooth. No ascites. The gallbladder has average size with no calcified stones or obvious wall thickening. The common bile duct measures 3 mm. The pancreas is not enlarged. The pancreatic duct is not dilated. The spleen measures 9 cm, normal size, with a 4 mm accessory splenic nodule. The adrenal glands are normal. Kidneys: No renal or ureter stones or hydronephrosis on either side. The left kidney has a prominent central column of Garret. No obvious renal masses or cyst.  No perinephric stranding. Normal aorta. No vascular calcification or stenosis. Small IVC suggesting dehydration. Lymph nodes: No obvious adenopathy. GI/Bowel: No hiatal hernia. Stomach is very distended with retained fluid, food stuffs and some high density material.  No obvious wall thickening or perforation. Small bowel loops are not significantly dilated or thickened. No obstruction. No umbilical hernia. No inguinal hernia. Pelvis: A normal appendix in the right lower quadrant. Very minimal residual stool in the very proximal colon, otherwise the colon is nondistended. No significant wall thickening. No obstruction, diverticulosis or diverticulitis. The urinary bladder was mildly full at the time of exam, no stones or wall thickening. Normal distal ureters. Average size of the prostate with small calcifications on the right side. Average size of the seminal vesicles. Moderate calcification of bilateral vas deferens which is usually a sign of diabetes. Peritoneum/Retroperitoneum: No free air or free fluid. Bones/Soft Tissues: Negative. 1.  Very distended stomach with retained fluid and food stuffs. 2.  No perforation or definite point of obstruction. 3.  Recommend screening for diabetes. 4.  Normal appendix.  RECOMMENDATIONS: Unavailable     XR CHEST PORTABLE    Result Date: 5/13/2022  EXAMINATION: ONE XRAY VIEW OF THE CHEST 5/13/2022 8:20 am COMPARISON: Chest radiograph June 6, 2020 HISTORY: ORDERING SYSTEM PROVIDED HISTORY: tachycardic, hypotension TECHNOLOGIST PROVIDED HISTORY: Reason for exam:->tachycardic, hypotension FINDINGS: The lungs are without acute focal process. There is no effusion or pneumothorax. The cardiomediastinal silhouette is without acute process. The osseous structures are without acute process. No acute pulmonary process     Assessment//Plan           Hospital Problems           Last Modified POA    * (Principal) Idiopathic acute pancreatitis 5/13/2022 Yes    Severe protein-calorie malnutrition ([facility] [address] 5/14/2022 Yes        Assessment & Plan  1. Anion gap metabolic acidosis   2. Alcoholic ketosis   3. Alcohol Dependency  4. Tobacco abuse   5. Elevated lipase   6. JUDD   7. Anxiety   8. Hypophosphatemia  9. Hiccups     Awaiting am labs.  on EKG. Had tried reglan for his hiccups without effect. Will try baclofen. Will repeat EKG today. Would not start thorazine due to elevated qtc. Anion gap normal. Continue to monitor intake.      Eyad Simental DO    Electronically signed by Eyad Simental DO on 5/15/22 at 7:40 AM EDT

## 2022-05-15 NOTE — PROGRESS NOTES
Patient cristobal came to nurses station and asked questions regarding patient discharge. Explained [facility] was going to met with patient today. She stated patient does not want to wait for that. RN went into room and spoke to the patient who confirmed he does not want to wait for [facility]. Stated he had their phone number and would contact them himself. Charge RN updated, Dr. Orellana Buckatunna contacted, will put in discharge.  Electronically signed by Marylu Alford RN on 5/15/2022 at 12:17 PM

## 2022-05-15 NOTE — DISCHARGE SUMMARY
Discharge Summary     Patient ID:  Manolo Grayson  [id number]  29 y.o. 1985 male  Nathaly Boyle DO        Admit date: 5/13/2022    Discharge date and time:  5/15/2022  12:15 PM      Activity level: As tolerated  Diet: Regular  Labs: None  Disposition:Home   Condition on Discharge:Stable  DME: None     Admit Diagnoses:   Patient Active Problem List   Diagnosis    Severe protein-calorie malnutrition [facility])       Discharge Diagnoses: Principal Problem (Resolved): Idiopathic acute pancreatitis  Active Problems:    Severe protein-calorie malnutrition (HCC)      Consults:  IP CONSULT TO CRITICAL CARE  IP CONSULT TO SOCIAL WORK    Procedures: None     Hospital Course: Patient is a 29year old male who presented to [address] [place] [address] in [place] due to nausea and vomiting for the last 48-36 hours. Patient states that he drinks a 5th of liquor every other day. He states that he recently tried to stop drinking. His last drink was on Monday. He states Tuesday he ate pizza that he suspected was spoiled and developed vomitting on Wednesday, Thursday and then Friday went to ER. He was found to have a significant anion gap, beta hydroxybutrate, and hyperglycemia. He was transferred to ICU for further care. He was hydrated with resolution of his anion gap metabolic acidosis, hyperglycemia and nausea and vomiting. He had improvement in his labs. Hiccups resolved with baclofen. QTC prolonged so thorazine not used. Discussed alcohol rehab with patient and given resources. Needs to quit drinking. Discharge Exam:  General Appearance:  Comfortable, well-appearing and in no acute distress. Vital signs: (most recent): Blood pressure 120/67, pulse 98, temperature 98 °F (36.7 °C), temperature source Oral, resp. rate 18, height 5' 10\" (1.778 m), weight 128 lb (58.1 kg), SpO2 99 %. Lungs:  Normal effort and normal respiratory rate. Breath sounds clear to auscultation. No rales or rhonchi. Heart: Normal rate.   Regular rhythm. S1 normal and S2 normal.  No friction rub. Abdomen: Abdomen is soft and non-distended. Bowel sounds are normal.   There is no rebound tenderness. There is no guarding. Extremities: Normal range of motion. There is no dependent edema. Skin:  Warm and dry. I/O last 3 completed shifts: In: 4426 [P.O.:220; I.V.:3955; IV Piggyback:251]  Out: -   No intake/output data recorded. LABS:  Recent Labs     05/13/22  1620 05/14/22  0531 05/15/22  0720    141 138   K 4.4 3.5 3.1*    110* 101   CO2 19* 25 27   BUN 33* 30* 10   CREATININE 0.8 0.6* 0.5*   GLUCOSE 170* 108* 98   CALCIUM 8.5* 8.7 8.6       Recent Labs     05/13/22 1620 05/14/22  0531 05/15/22  0720   WBC 15.3* 10.9 6.3   RBC 3.44* 2.77* 2.57*   HGB 12.1* 9.5* 9.0*   HCT 34.5* 27.5* 25.4*   .3* 99.3 98.8   MCH 35.2* 34.3 35.0   MCHC 35.1* 34.5 35.4*   RDW 12.7 12.8 12.2    155 141   MPV 10.8 10.9 11.8       No results for input(s): GLUMET in the last 72 hours. Imaging:  CT ABDOMEN PELVIS WO CONTRAST Additional Contrast? None    Result Date: 5/13/2022  EXAMINATION: CT OF THE ABDOMEN AND PELVIS WITHOUT CONTRAST 5/13/2022 8:34 am TECHNIQUE: CT of the abdomen and pelvis was performed without the administration of intravenous contrast. Multiplanar reformatted images are provided for review. Automated exposure control, iterative reconstruction, and/or weight based adjustment of the mA/kV was utilized to reduce the radiation dose to as low as reasonably achievable. Dose is total .14 M Gy cm. COMPARISON: CT abdomen and pelvis with contrast 04/24/2010.  HISTORY: ORDERING SYSTEM PROVIDED HISTORY: Abdominal pain, tachycardia, leukocytosis TECHNOLOGIST PROVIDED HISTORY: Reason for exam:->Abdominal pain, tachycardia, leukocytosis Additional Contrast?->None Decision Support Exception - unselect if not a suspected or confirmed emergency medical condition->Emergency Medical Condition (MA) FINDINGS: Lower Chest: Lung bases are clear. No effusion. Heart size normal.  No hiatal hernia. Organs: Liver: Liver length 14.6 cm. Normal size. No obvious liver masses or bile duct dilatation. Liver margins are smooth. No ascites. The gallbladder has average size with no calcified stones or obvious wall thickening. The common bile duct measures 3 mm. The pancreas is not enlarged. The pancreatic duct is not dilated. The spleen measures 9 cm, normal size, with a 4 mm accessory splenic nodule. The adrenal glands are normal. Kidneys: No renal or ureter stones or hydronephrosis on either side. The left kidney has a prominent central column of Garret. No obvious renal masses or cyst.  No perinephric stranding. Normal aorta. No vascular calcification or stenosis. Small IVC suggesting dehydration. Lymph nodes: No obvious adenopathy. GI/Bowel: No hiatal hernia. Stomach is very distended with retained fluid, food stuffs and some high density material.  No obvious wall thickening or perforation. Small bowel loops are not significantly dilated or thickened. No obstruction. No umbilical hernia. No inguinal hernia. Pelvis: A normal appendix in the right lower quadrant. Very minimal residual stool in the very proximal colon, otherwise the colon is nondistended. No significant wall thickening. No obstruction, diverticulosis or diverticulitis. The urinary bladder was mildly full at the time of exam, no stones or wall thickening. Normal distal ureters. Average size of the prostate with small calcifications on the right side. Average size of the seminal vesicles. Moderate calcification of bilateral vas deferens which is usually a sign of diabetes. Peritoneum/Retroperitoneum: No free air or free fluid. Bones/Soft Tissues: Negative. 1.  Very distended stomach with retained fluid and food stuffs. 2.  No perforation or definite point of obstruction. 3.  Recommend screening for diabetes. 4.  Normal appendix.  RECOMMENDATIONS: Unavailable XR CHEST PORTABLE    Result Date: 5/13/2022  EXAMINATION: ONE XRAY VIEW OF THE CHEST 5/13/2022 8:20 am COMPARISON: Chest radiograph June 6, 2020 HISTORY: ORDERING SYSTEM PROVIDED HISTORY: tachycardic, hypotension TECHNOLOGIST PROVIDED HISTORY: Reason for exam:->tachycardic, hypotension FINDINGS: The lungs are without acute focal process. There is no effusion or pneumothorax. The cardiomediastinal silhouette is without acute process. The osseous structures are without acute process.      No acute pulmonary process       Patient Instructions:   Current Discharge Medication List      START taking these medications    Details   baclofen (LIORESAL) 10 MG tablet Take 1 tablet by mouth 3 times daily  Qty: 10 tablet, Refills: 0      pantoprazole (PROTONIX) 40 MG tablet Take 1 tablet by mouth every morning (before breakfast)  Qty: 30 tablet, Refills: 3      thiamine 100 MG tablet Take 1 tablet by mouth daily  Qty: 30 tablet, Refills: 3      folic acid (FOLVITE) 1 MG tablet Take 1 tablet by mouth daily  Qty: 30 tablet, Refills: 5         CONTINUE these medications which have NOT CHANGED    Details   PARoxetine (PAXIL) 10 MG tablet Take 10 mg by mouth every morning               Note that more than 30 minutes was spent in preparing discharge papers, discussing discharge with patient, medication review, etc.      Signed:    Familia Watters DO    Electronically signed by Familia Watters DO on 5/15/2022 at 12:15 PM

## 2022-05-16 LAB
EKG ATRIAL RATE: 129 BPM
EKG ATRIAL RATE: 86 BPM
EKG P AXIS: 36 DEGREES
EKG P AXIS: 71 DEGREES
EKG P-R INTERVAL: 120 MS
EKG P-R INTERVAL: 134 MS
EKG Q-T INTERVAL: 328 MS
EKG Q-T INTERVAL: 384 MS
EKG QRS DURATION: 104 MS
EKG QRS DURATION: 90 MS
EKG QTC CALCULATION (BAZETT): 459 MS
EKG QTC CALCULATION (BAZETT): 480 MS
EKG R AXIS: 77 DEGREES
EKG R AXIS: 93 DEGREES
EKG T AXIS: 59 DEGREES
EKG T AXIS: 76 DEGREES
EKG VENTRICULAR RATE: 129 BPM
EKG VENTRICULAR RATE: 86 BPM

## 2022-05-16 PROCEDURE — 93010 ELECTROCARDIOGRAM REPORT: CPT | Performed by: INTERNAL MEDICINE

## 2022-11-25 ENCOUNTER — HOSPITAL ENCOUNTER (EMERGENCY)
Age: 37
Discharge: HOME | End: 2022-11-25
Attending: STUDENT IN AN ORGANIZED HEALTH CARE EDUCATION/TRAINING PROGRAM

## 2022-11-25 ENCOUNTER — NURSE TRIAGE (OUTPATIENT)
Dept: OTHER | Facility: CLINIC | Age: 37
End: 2022-11-25

## 2022-11-25 ENCOUNTER — APPOINTMENT (OUTPATIENT)
Dept: CT IMAGING | Age: 37
End: 2022-11-25

## 2022-11-25 VITALS
HEART RATE: 87 BPM | SYSTOLIC BLOOD PRESSURE: 148 MMHG | TEMPERATURE: 98.5 F | DIASTOLIC BLOOD PRESSURE: 88 MMHG | HEIGHT: 70 IN | RESPIRATION RATE: 17 BRPM | WEIGHT: 135 LBS | BODY MASS INDEX: 19.33 KG/M2 | OXYGEN SATURATION: 99 %

## 2022-11-25 DIAGNOSIS — E86.0 DEHYDRATION: ICD-10-CM

## 2022-11-25 DIAGNOSIS — R11.2 NAUSEA AND VOMITING, UNSPECIFIED VOMITING TYPE: Primary | ICD-10-CM

## 2022-11-25 LAB
ALBUMIN SERPL-MCNC: 4.6 G/DL (ref 3.5–5.2)
ALP BLD-CCNC: 75 U/L (ref 40–129)
ALT SERPL-CCNC: 15 U/L (ref 0–40)
ANION GAP SERPL CALCULATED.3IONS-SCNC: 15 MMOL/L (ref 7–16)
AST SERPL-CCNC: 26 U/L (ref 0–39)
BACTERIA: NORMAL /HPF
BASOPHILS ABSOLUTE: 0.04 E9/L (ref 0–0.2)
BASOPHILS RELATIVE PERCENT: 0.4 % (ref 0–2)
BILIRUB SERPL-MCNC: 1.4 MG/DL (ref 0–1.2)
BILIRUBIN URINE: ABNORMAL
BLOOD, URINE: NEGATIVE
BUN BLDV-MCNC: 17 MG/DL (ref 6–20)
CALCIUM SERPL-MCNC: 10.5 MG/DL (ref 8.6–10.2)
CHLORIDE BLD-SCNC: 92 MMOL/L (ref 98–107)
CHP ED QC CHECK: NORMAL
CLARITY: CLEAR
CO2: 29 MMOL/L (ref 22–29)
COLOR: ABNORMAL
CREAT SERPL-MCNC: 0.7 MG/DL (ref 0.7–1.2)
EOSINOPHILS ABSOLUTE: 0.11 E9/L (ref 0.05–0.5)
EOSINOPHILS RELATIVE PERCENT: 1.2 % (ref 0–6)
GFR SERPL CREATININE-BSD FRML MDRD: >60 ML/MIN/1.73
GLUCOSE BLD-MCNC: 104 MG/DL (ref 74–99)
GLUCOSE BLD-MCNC: 112 MG/DL
GLUCOSE URINE: NEGATIVE MG/DL
HCT VFR BLD CALC: 51.5 % (ref 37–54)
HEMOGLOBIN: 18.1 G/DL (ref 12.5–16.5)
IMMATURE GRANULOCYTES #: 0.05 E9/L
IMMATURE GRANULOCYTES %: 0.5 % (ref 0–5)
KETONES, URINE: >=80 MG/DL
LACTIC ACID: 1.2 MMOL/L (ref 0.5–2.2)
LEUKOCYTE ESTERASE, URINE: NEGATIVE
LIPASE: 70 U/L (ref 13–60)
LYMPHOCYTES ABSOLUTE: 2 E9/L (ref 1.5–4)
LYMPHOCYTES RELATIVE PERCENT: 21.6 % (ref 20–42)
MCH RBC QN AUTO: 33.5 PG (ref 26–35)
MCHC RBC AUTO-ENTMCNC: 35.1 % (ref 32–34.5)
MCV RBC AUTO: 95.4 FL (ref 80–99.9)
METER GLUCOSE: 112 MG/DL (ref 74–99)
MONOCYTES ABSOLUTE: 0.96 E9/L (ref 0.1–0.95)
MONOCYTES RELATIVE PERCENT: 10.4 % (ref 2–12)
NEUTROPHILS ABSOLUTE: 6.08 E9/L (ref 1.8–7.3)
NEUTROPHILS RELATIVE PERCENT: 65.9 % (ref 43–80)
NITRITE, URINE: NEGATIVE
PDW BLD-RTO: 12.2 FL (ref 11.5–15)
PH UA: 6 (ref 5–9)
PLATELET # BLD: 184 E9/L (ref 130–450)
PMV BLD AUTO: 10.1 FL (ref 7–12)
POTASSIUM SERPL-SCNC: 3.7 MMOL/L (ref 3.5–5)
PROTEIN UA: 30 MG/DL
RBC # BLD: 5.4 E12/L (ref 3.8–5.8)
RBC UA: NORMAL /HPF (ref 0–2)
REASON FOR REJECTION: NORMAL
REJECTED TEST: NORMAL
SODIUM BLD-SCNC: 136 MMOL/L (ref 132–146)
SPECIFIC GRAVITY UA: 1.02 (ref 1–1.03)
TOTAL PROTEIN: 8 G/DL (ref 6.4–8.3)
TROPONIN, HIGH SENSITIVITY: <6 NG/L (ref 0–11)
UROBILINOGEN, URINE: 1 E.U./DL
WBC # BLD: 9.2 E9/L (ref 4.5–11.5)
WBC UA: NORMAL /HPF (ref 0–5)

## 2022-11-25 PROCEDURE — 74177 CT ABD & PELVIS W/CONTRAST: CPT

## 2022-11-25 PROCEDURE — 2580000003 HC RX 258: Performed by: STUDENT IN AN ORGANIZED HEALTH CARE EDUCATION/TRAINING PROGRAM

## 2022-11-25 PROCEDURE — 83690 ASSAY OF LIPASE: CPT

## 2022-11-25 PROCEDURE — 82962 GLUCOSE BLOOD TEST: CPT

## 2022-11-25 PROCEDURE — 85025 COMPLETE CBC W/AUTO DIFF WBC: CPT

## 2022-11-25 PROCEDURE — 83605 ASSAY OF LACTIC ACID: CPT

## 2022-11-25 PROCEDURE — 84484 ASSAY OF TROPONIN QUANT: CPT

## 2022-11-25 PROCEDURE — 80053 COMPREHEN METABOLIC PANEL: CPT

## 2022-11-25 PROCEDURE — 93005 ELECTROCARDIOGRAM TRACING: CPT | Performed by: STUDENT IN AN ORGANIZED HEALTH CARE EDUCATION/TRAINING PROGRAM

## 2022-11-25 PROCEDURE — 99285 EMERGENCY DEPT VISIT HI MDM: CPT

## 2022-11-25 PROCEDURE — 36415 COLL VENOUS BLD VENIPUNCTURE: CPT

## 2022-11-25 PROCEDURE — 6360000004 HC RX CONTRAST MEDICATION: Performed by: STUDENT IN AN ORGANIZED HEALTH CARE EDUCATION/TRAINING PROGRAM

## 2022-11-25 PROCEDURE — 81001 URINALYSIS AUTO W/SCOPE: CPT

## 2022-11-25 RX ORDER — SODIUM CHLORIDE 0.9 % (FLUSH) 0.9 %
10 SYRINGE (ML) INJECTION PRN
Status: COMPLETED | OUTPATIENT
Start: 2022-11-25 | End: 2022-11-25

## 2022-11-25 RX ORDER — 0.9 % SODIUM CHLORIDE 0.9 %
1000 INTRAVENOUS SOLUTION INTRAVENOUS ONCE
Status: COMPLETED | OUTPATIENT
Start: 2022-11-25 | End: 2022-11-25

## 2022-11-25 RX ORDER — ONDANSETRON 4 MG/1
4 TABLET, ORALLY DISINTEGRATING ORAL EVERY 8 HOURS PRN
Qty: 10 TABLET | Refills: 0 | Status: SHIPPED | OUTPATIENT
Start: 2022-11-25

## 2022-11-25 RX ORDER — 0.9 % SODIUM CHLORIDE 0.9 %
1000 INTRAVENOUS SOLUTION INTRAVENOUS ONCE
Status: DISCONTINUED | OUTPATIENT
Start: 2022-11-25 | End: 2022-11-25 | Stop reason: HOSPADM

## 2022-11-25 RX ADMIN — IOPAMIDOL 50 ML: 755 INJECTION, SOLUTION INTRAVENOUS at 17:48

## 2022-11-25 RX ADMIN — SODIUM CHLORIDE 1000 ML: 9 INJECTION, SOLUTION INTRAVENOUS at 15:20

## 2022-11-25 RX ADMIN — SODIUM CHLORIDE, PRESERVATIVE FREE 10 ML: 5 INJECTION INTRAVENOUS at 17:48

## 2022-11-25 ASSESSMENT — PAIN DESCRIPTION - ORIENTATION: ORIENTATION: MID

## 2022-11-25 ASSESSMENT — ENCOUNTER SYMPTOMS
WHEEZING: 0
EYE PAIN: 0
SINUS PRESSURE: 0
EYE REDNESS: 0
NAUSEA: 1
SORE THROAT: 0
SHORTNESS OF BREATH: 0
ANAL BLEEDING: 0
ABDOMINAL PAIN: 1
EYE DISCHARGE: 0
BACK PAIN: 0
COUGH: 0
VOMITING: 1
DIARRHEA: 0

## 2022-11-25 ASSESSMENT — PAIN - FUNCTIONAL ASSESSMENT
PAIN_FUNCTIONAL_ASSESSMENT: 0-10
PAIN_FUNCTIONAL_ASSESSMENT: PREVENTS OR INTERFERES SOME ACTIVE ACTIVITIES AND ADLS

## 2022-11-25 ASSESSMENT — PAIN DESCRIPTION - ONSET: ONSET: ON-GOING

## 2022-11-25 ASSESSMENT — PAIN DESCRIPTION - PAIN TYPE: TYPE: ACUTE PAIN

## 2022-11-25 ASSESSMENT — PAIN DESCRIPTION - DESCRIPTORS: DESCRIPTORS: CRAMPING

## 2022-11-25 ASSESSMENT — PAIN SCALES - GENERAL: PAINLEVEL_OUTOF10: 2

## 2022-11-25 ASSESSMENT — PAIN DESCRIPTION - LOCATION: LOCATION: ABDOMEN

## 2022-11-25 ASSESSMENT — PAIN DESCRIPTION - FREQUENCY: FREQUENCY: INTERMITTENT

## 2022-11-25 NOTE — TELEPHONE ENCOUNTER
Location of patient: [place]    Received call from Andreea at [facility]; Patient with [employer] Complaint requesting to establish care with [facility]. Subjective: Caller states \"Dizziness\"     Current Symptoms:   Patient states he was vomiting M-W, but that has since resolved. He has been able to keep water, Gatorade, Pedialyte, and green bean casserole down over the past 24 hours but states M-W he was unable to keep many fluids down. Denies diarrhea  He has been feeling lightheaded since Wednesday. Generalized weakness  Able to ambulate, but states the dizziness/lightheadedness worsens with standing and moving around. Patient has showered today, but states after 10 minutes in the shower he became dizzy. Onset: 3 days ago; improving    Pain Severity: Mild; dull- patient states he is having back pain, but thinks it is from lying in bed since Monday. Temperature: Denies    What has been tried: Antacid    Recommended disposition: See in Office Today  Advised UCC/ER if no available appts. Care advice provided, patient verbalizes understanding; denies any other questions or concerns; instructed to call back for any new or worsening symptoms. Patient/Caller agrees with recommended disposition; writer provided warm transfer to Cyndee Archibald at [facility] for appointment scheduling    Attention Provider: Thank you for allowing me to participate in the care of your patient. The patient was connected to triage in response to information provided to the [facility]. Please do not respond through this encounter as the response is not directed to a shared pool.     Reason for Disposition   MODERATE dizziness (e.g., interferes with normal activities) (Exception: dizziness caused by heat exposure, sudden standing, or poor fluid intake)    Protocols used: Dizziness-ADULT-[place]

## 2022-11-25 NOTE — Clinical Note
Allen Rosado was seen and treated in our emergency department on 11/25/2022. He may return to work on 11/28/2022. If you have any questions or concerns, please don't hesitate to call.       Gretel Blizzard, DO

## 2022-11-25 NOTE — ED PROVIDER NOTES
Department of Emergency Medicine   ED  Provider Note  Admit Date/RoomTime: 11/25/2022  1:41 PM  ED Room: 15/15              Stanley Roldan is a 40 y.o. male with a PMHx significant for Alcohol use, tobacco abuse who presents for evaluation of nausea, vomiting, lightheadedness, beginning at arrival.  The complaint has been persistent, moderate in severity, and worsened by eating or drinking. The patient states that about 4 days ago in the evening he began to have nausea with vomiting. He spent the entire evening and the following day with nausea and vomiting. Nonbloody emesis. States anytime he tried to eat or drink anything would come back up. Notes that 2 days ago did somewhat improved, he had at least fever keep stuff down, however soon as he goes eat or drink anything he starts having severe abdominal cramping and does feel lightheadedness. Denies any dizziness, chest pain, shortness of breath, urinary symptoms, diarrhea. He did have a normal bowel movement today. Endorses history of alcohol use in the past causing pancreatitis, states he has had nothing to drink for the past few months. Notes chronic cough with smoking but denies any change in that. No fevers or chills. The history is provided by the patient and medical records. Review of Systems   Constitutional:  Negative for chills and fever. HENT:  Negative for ear pain, sinus pressure and sore throat. Eyes:  Negative for pain, discharge and redness. Respiratory:  Negative for cough, shortness of breath and wheezing. Cardiovascular:  Negative for chest pain. Gastrointestinal:  Positive for abdominal pain (cramping), nausea and vomiting. Negative for anal bleeding and diarrhea. Genitourinary:  Negative for dysuria and frequency. Musculoskeletal:  Negative for arthralgias and back pain. Skin:  Negative for rash and wound. Neurological:  Negative for weakness and headaches. Hematological:  Negative for adenopathy. secondary toHemoconcentration. UA demonstrating greater than 80 ketones consistent with dehydration. Of note the lipase was found to be 70 and a bili total of 1.4 therefore CT abdomen pelvis was ordered which demonstrated no radiopaque gallstone, no intra or extrahepatic biliary ductal dilation. Circumferential bladder wall thickening may be related to nondistention, UA negative for acute findings of infection. Patient was given IVF for their symptoms . Patient continues to be non-toxic on re-evaluation. Findings were discussed with the patient and reasons to immediately return to the ED were articulated to them. They will follow-up with their PCP,. Given RX for Zofran to go home with.     --------------------------------------------- PAST HISTORY ---------------------------------------------  Past Medical History:  has a past medical history of Benign essential tremor and Tremor. Past Surgical History:  has a past surgical history that includes [place] tooth extraction and Colonoscopy. Social History:  reports that he has been smoking cigars. He has been smoking an average of .5 packs per day. He has never used smokeless tobacco. He reports current alcohol use of about 30.0 standard drinks per week. He reports current drug use. Drug: Marijuana Dietra Due). Family History: family history includes Asthma in his sister; Cancer in his father; Diabetes in his mother. The patients home medications have been reviewed.     Allergies: Cipro xr    -------------------------------------------------- RESULTS -------------------------------------------------  Labs:  Results for orders placed or performed during the hospital encounter of 11/25/22   CMP   Result Value Ref Range    Sodium 136 132 - 146 mmol/L    Potassium 3.7 3.5 - 5.0 mmol/L    Chloride 92 (L) 98 - 107 mmol/L    CO2 29 22 - 29 mmol/L    Anion Gap 15 7 - 16 mmol/L    Glucose 104 (H) 74 - 99 mg/dL    BUN 17 6 - 20 mg/dL    Creatinine 0.7 0.7 - 1.2 mg/dL Est, Glom Filt Rate >60 >=60 mL/min/1.73    Calcium 10.5 (H) 8.6 - 10.2 mg/dL    Total Protein 8.0 6.4 - 8.3 g/dL    Albumin 4.6 3.5 - 5.2 g/dL    Total Bilirubin 1.4 (H) 0.0 - 1.2 mg/dL    Alkaline Phosphatase 75 40 - 129 U/L    ALT 15 0 - 40 U/L    AST 26 0 - 39 U/L   Lactic Acid   Result Value Ref Range    Lactic Acid 1.2 0.5 - 2.2 mmol/L   Lipase   Result Value Ref Range    Lipase 70 (H) 13 - 60 U/L   Urinalysis   Result Value Ref Range    Color, UA DARK YELLOW (A) Straw/Yellow    Clarity, UA Clear Clear    Glucose, Ur Negative Negative mg/dL    Bilirubin Urine MODERATE (A) Negative    Ketones, Urine >=80 (A) Negative mg/dL    Specific Gravity, UA 1.020 1.005 - 1.030    Blood, Urine Negative Negative    pH, UA 6.0 5.0 - 9.0    Protein, UA 30 (A) Negative mg/dL    Urobilinogen, Urine 1.0 <2.0 E.U./dL    Nitrite, Urine Negative Negative    Leukocyte Esterase, Urine Negative Negative   Troponin   Result Value Ref Range    Troponin, High Sensitivity <6 0 - 11 ng/L   CBC with Auto Differential   Result Value Ref Range    WBC 9.2 4.5 - 11.5 E9/L    RBC 5.40 3.80 - 5.80 E12/L    Hemoglobin 18.1 (H) 12.5 - 16.5 g/dL    Hematocrit 51.5 37.0 - 54.0 %    MCV 95.4 80.0 - 99.9 fL    MCH 33.5 26.0 - 35.0 pg    MCHC 35.1 (H) 32.0 - 34.5 %    RDW 12.2 11.5 - 15.0 fL    Platelets 163 482 - 742 E9/L    MPV 10.1 7.0 - 12.0 fL    Neutrophils % 65.9 43.0 - 80.0 %    Immature Granulocytes % 0.5 0.0 - 5.0 %    Lymphocytes % 21.6 20.0 - 42.0 %    Monocytes % 10.4 2.0 - 12.0 %    Eosinophils % 1.2 0.0 - 6.0 %    Basophils % 0.4 0.0 - 2.0 %    Neutrophils Absolute 6.08 1.80 - 7.30 E9/L    Immature Granulocytes # 0.05 E9/L    Lymphocytes Absolute 2.00 1.50 - 4.00 E9/L    Monocytes Absolute 0.96 (H) 0.10 - 0.95 E9/L    Eosinophils Absolute 0.11 0.05 - 0.50 E9/L    Basophils Absolute 0.04 0.00 - 0.20 E9/L   SPECIMEN REJECTION   Result Value Ref Range    Rejected Test CBCWD     Reason for Rejection see below    Microscopic Urinalysis Result Value Ref Range    WBC, UA 0-1 0 - 5 /HPF    RBC, UA 0-1 0 - 2 /HPF    Bacteria, UA NONE SEEN None Seen /HPF   POCT Glucose   Result Value Ref Range    Glucose 112 mg/dL    QC OK? y    POCT Glucose   Result Value Ref Range    Meter Glucose 112 (H) 74 - 99 mg/dL   EKG 12 Lead   Result Value Ref Range    Ventricular Rate 79 BPM    Atrial Rate 79 BPM    P-R Interval 138 ms    QRS Duration 98 ms    Q-T Interval 388 ms    QTc Calculation (Bazett) 444 ms    P Axis 56 degrees    R Axis 87 degrees    T Axis 71 degrees       Radiology:  CT ABDOMEN PELVIS W IV CONTRAST Additional Contrast? None   Final Result   No radiopaque gallstone. No intra or extrahepatic biliary ductal dilatation. Circumferential bladder wall thickening, which may be related to   nondistention. Recommend clinical and laboratory correlation to exclude   cystitis.             ------------------------- NURSING NOTES AND VITALS REVIEWED ---------------------------  Date / Time Roomed:  11/25/2022  1:41 PM  ED Bed Assignment:  15/15    The nursing notes within the ED encounter and vital signs as below have been reviewed. BP (!) 148/88   Pulse 87   Temp 98.5 °F (36.9 °C)   Resp 17   Ht 5' 10\" (1.778 m)   Wt 135 lb (61.2 kg)   SpO2 99%   BMI 19.37 kg/m²   Oxygen Saturation Interpretation: Normal      ------------------------------------------ PROGRESS NOTES ------------------------------------------  11:44 AM EST  I have spoken with the patient and discussed todays results, in addition to providing specific details for the plan of care and counseling regarding the diagnosis and prognosis. Their questions are answered at this time and they are agreeable with the plan. I discussed at length with them reasons for immediate return here for re evaluation. They will followup with their primary care physician by calling their office tomorrow.       --------------------------------- ADDITIONAL PROVIDER NOTES ---------------------------------  At this time the patient is without objective evidence of an acute process requiring hospitalization or inpatient management. They have remained hemodynamically stable throughout their entire ED visit and are stable for discharge with outpatient follow-up. The plan has been discussed in detail and they are aware of the specific conditions for emergent return, as well as the importance of follow-up. Discharge Medication List as of 11/25/2022  6:30 PM        START taking these medications    Details   ondansetron (ZOFRAN ODT) 4 MG disintegrating tablet Take 1 tablet by mouth every 8 hours as needed for Nausea or Vomiting, Disp-10 tablet, R-[id number]             Diagnosis:  1. Nausea and vomiting, unspecified vomiting type    2. Dehydration        Disposition:  Patient's disposition: Discharge to home  Patient's condition is stable.          Aditya Chung DO  11/28/22 1146

## 2022-11-28 LAB
EKG ATRIAL RATE: 79 BPM
EKG P AXIS: 56 DEGREES
EKG P-R INTERVAL: 138 MS
EKG Q-T INTERVAL: 388 MS
EKG QRS DURATION: 98 MS
EKG QTC CALCULATION (BAZETT): 444 MS
EKG R AXIS: 87 DEGREES
EKG T AXIS: 71 DEGREES
EKG VENTRICULAR RATE: 79 BPM

## 2023-02-28 ENCOUNTER — APPOINTMENT (OUTPATIENT)
Dept: CT IMAGING | Age: 38
End: 2023-02-28
Payer: COMMERCIAL

## 2023-02-28 ENCOUNTER — APPOINTMENT (OUTPATIENT)
Dept: GENERAL RADIOLOGY | Age: 38
End: 2023-02-28
Payer: COMMERCIAL

## 2023-02-28 ENCOUNTER — HOSPITAL ENCOUNTER (INPATIENT)
Age: 38
LOS: 3 days | Discharge: HOME | End: 2023-03-03
Attending: EMERGENCY MEDICINE | Admitting: INTERNAL MEDICINE
Payer: COMMERCIAL

## 2023-02-28 DIAGNOSIS — E87.20 LACTIC ACIDOSIS: ICD-10-CM

## 2023-02-28 DIAGNOSIS — F10.920 ACUTE ALCOHOLIC INTOXICATION WITHOUT COMPLICATION (HCC): ICD-10-CM

## 2023-02-28 DIAGNOSIS — I95.9 HYPOTENSION, UNSPECIFIED HYPOTENSION TYPE: ICD-10-CM

## 2023-02-28 DIAGNOSIS — F10.10 ALCOHOL ABUSE: Primary | ICD-10-CM

## 2023-02-28 DIAGNOSIS — E86.0 DEHYDRATION: ICD-10-CM

## 2023-02-28 LAB
ACETAMINOPHEN LEVEL: <5 MCG/ML (ref 10–30)
ALBUMIN SERPL-MCNC: 4.5 G/DL (ref 3.5–5.2)
ALP BLD-CCNC: 66 U/L (ref 40–129)
ALT SERPL-CCNC: 14 U/L (ref 0–40)
ANION GAP SERPL CALCULATED.3IONS-SCNC: 18 MMOL/L (ref 7–16)
ANION GAP SERPL CALCULATED.3IONS-SCNC: 23 MMOL/L (ref 7–16)
AST SERPL-CCNC: 35 U/L (ref 0–39)
BASOPHILS ABSOLUTE: 0.13 E9/L (ref 0–0.2)
BASOPHILS RELATIVE PERCENT: 0.9 % (ref 0–2)
BILIRUB SERPL-MCNC: 0.4 MG/DL (ref 0–1.2)
BUN BLDV-MCNC: 8 MG/DL (ref 6–20)
BUN BLDV-MCNC: 9 MG/DL (ref 6–20)
CALCIUM SERPL-MCNC: 8.2 MG/DL (ref 8.6–10.2)
CALCIUM SERPL-MCNC: 9.4 MG/DL (ref 8.6–10.2)
CHLORIDE BLD-SCNC: 102 MMOL/L (ref 98–107)
CHLORIDE BLD-SCNC: 105 MMOL/L (ref 98–107)
CHP ED QC CHECK: NORMAL
CO2: 19 MMOL/L (ref 22–29)
CO2: 20 MMOL/L (ref 22–29)
CREAT SERPL-MCNC: 0.7 MG/DL (ref 0.7–1.2)
CREAT SERPL-MCNC: 0.8 MG/DL (ref 0.7–1.2)
EKG ATRIAL RATE: 75 BPM
EKG P AXIS: 47 DEGREES
EKG P-R INTERVAL: 130 MS
EKG Q-T INTERVAL: 412 MS
EKG QRS DURATION: 104 MS
EKG QTC CALCULATION (BAZETT): 460 MS
EKG R AXIS: 85 DEGREES
EKG T AXIS: 72 DEGREES
EKG VENTRICULAR RATE: 75 BPM
EOSINOPHILS ABSOLUTE: 0.04 E9/L (ref 0.05–0.5)
EOSINOPHILS RELATIVE PERCENT: 0.3 % (ref 0–6)
ETHANOL: 272 MG/DL (ref 0–0.08)
GFR SERPL CREATININE-BSD FRML MDRD: >60 ML/MIN/1.73
GFR SERPL CREATININE-BSD FRML MDRD: >60 ML/MIN/1.73
GLUCOSE BLD-MCNC: 52 MG/DL (ref 74–99)
GLUCOSE BLD-MCNC: 66 MG/DL
GLUCOSE BLD-MCNC: 68 MG/DL (ref 74–99)
HCT VFR BLD CALC: 48.3 % (ref 37–54)
HEMOGLOBIN: 16.4 G/DL (ref 12.5–16.5)
IMMATURE GRANULOCYTES #: 0.06 E9/L
IMMATURE GRANULOCYTES %: 0.4 % (ref 0–5)
LACTIC ACID, SEPSIS: 5.8 MMOL/L (ref 0.5–1.9)
LACTIC ACID: 2.7 MMOL/L (ref 0.5–2.2)
LYMPHOCYTES ABSOLUTE: 1.81 E9/L (ref 1.5–4)
LYMPHOCYTES RELATIVE PERCENT: 12.8 % (ref 20–42)
MCH RBC QN AUTO: 32.3 PG (ref 26–35)
MCHC RBC AUTO-ENTMCNC: 34 % (ref 32–34.5)
MCV RBC AUTO: 95.1 FL (ref 80–99.9)
METER GLUCOSE: 113 MG/DL (ref 74–99)
METER GLUCOSE: 60 MG/DL (ref 74–99)
METER GLUCOSE: 66 MG/DL (ref 74–99)
MONOCYTES ABSOLUTE: 0.43 E9/L (ref 0.1–0.95)
MONOCYTES RELATIVE PERCENT: 3 % (ref 2–12)
NEUTROPHILS ABSOLUTE: 11.68 E9/L (ref 1.8–7.3)
NEUTROPHILS RELATIVE PERCENT: 82.6 % (ref 43–80)
PDW BLD-RTO: 15.9 FL (ref 11.5–15)
PLATELET # BLD: 224 E9/L (ref 130–450)
PMV BLD AUTO: 11.5 FL (ref 7–12)
POTASSIUM SERPL-SCNC: 3.9 MMOL/L (ref 3.5–5)
POTASSIUM SERPL-SCNC: 4.6 MMOL/L (ref 3.5–5)
RBC # BLD: 5.08 E12/L (ref 3.8–5.8)
SALICYLATE, SERUM: <0.3 MG/DL (ref 0–30)
SODIUM BLD-SCNC: 142 MMOL/L (ref 132–146)
SODIUM BLD-SCNC: 145 MMOL/L (ref 132–146)
TOTAL PROTEIN: 7.3 G/DL (ref 6.4–8.3)
TRICYCLIC ANTIDEPRESSANTS SCREEN SERUM: NEGATIVE NG/ML
TROPONIN, HIGH SENSITIVITY: <6 NG/L (ref 0–11)
WBC # BLD: 14.2 E9/L (ref 4.5–11.5)

## 2023-02-28 PROCEDURE — 70450 CT HEAD/BRAIN W/O DYE: CPT

## 2023-02-28 PROCEDURE — 2580000003 HC RX 258: Performed by: EMERGENCY MEDICINE

## 2023-02-28 PROCEDURE — 6360000004 HC RX CONTRAST MEDICATION: Performed by: RADIOLOGY

## 2023-02-28 PROCEDURE — 80048 BASIC METABOLIC PNL TOTAL CA: CPT

## 2023-02-28 PROCEDURE — 80143 DRUG ASSAY ACETAMINOPHEN: CPT

## 2023-02-28 PROCEDURE — 2060000000 HC ICU INTERMEDIATE R&B

## 2023-02-28 PROCEDURE — 96360 HYDRATION IV INFUSION INIT: CPT

## 2023-02-28 PROCEDURE — 80307 DRUG TEST PRSMV CHEM ANLYZR: CPT

## 2023-02-28 PROCEDURE — 83605 ASSAY OF LACTIC ACID: CPT

## 2023-02-28 PROCEDURE — 70496 CT ANGIOGRAPHY HEAD: CPT

## 2023-02-28 PROCEDURE — 82077 ASSAY SPEC XCP UR&BREATH IA: CPT

## 2023-02-28 PROCEDURE — 80053 COMPREHEN METABOLIC PANEL: CPT

## 2023-02-28 PROCEDURE — 85025 COMPLETE CBC W/AUTO DIFF WBC: CPT

## 2023-02-28 PROCEDURE — 93005 ELECTROCARDIOGRAM TRACING: CPT | Performed by: EMERGENCY MEDICINE

## 2023-02-28 PROCEDURE — 93010 ELECTROCARDIOGRAM REPORT: CPT | Performed by: INTERNAL MEDICINE

## 2023-02-28 PROCEDURE — 84484 ASSAY OF TROPONIN QUANT: CPT

## 2023-02-28 PROCEDURE — 80179 DRUG ASSAY SALICYLATE: CPT

## 2023-02-28 PROCEDURE — 96361 HYDRATE IV INFUSION ADD-ON: CPT

## 2023-02-28 PROCEDURE — 70498 CT ANGIOGRAPHY NECK: CPT

## 2023-02-28 PROCEDURE — 82962 GLUCOSE BLOOD TEST: CPT

## 2023-02-28 PROCEDURE — 71045 X-RAY EXAM CHEST 1 VIEW: CPT

## 2023-02-28 PROCEDURE — 0042T CT BRAIN PERFUSION: CPT

## 2023-02-28 PROCEDURE — 99285 EMERGENCY DEPT VISIT HI MDM: CPT

## 2023-02-28 RX ORDER — 0.9 % SODIUM CHLORIDE 0.9 %
1000 INTRAVENOUS SOLUTION INTRAVENOUS ONCE
Status: COMPLETED | OUTPATIENT
Start: 2023-02-28 | End: 2023-02-28

## 2023-02-28 RX ADMIN — SODIUM CHLORIDE 1000 ML: 9 INJECTION, SOLUTION INTRAVENOUS at 16:09

## 2023-02-28 RX ADMIN — IOPAMIDOL 100 ML: 755 INJECTION, SOLUTION INTRAVENOUS at 15:28

## 2023-02-28 RX ADMIN — SODIUM CHLORIDE 1000 ML: 9 INJECTION, SOLUTION INTRAVENOUS at 19:24

## 2023-02-28 RX ADMIN — SODIUM CHLORIDE 1000 ML: 9 INJECTION, SOLUTION INTRAVENOUS at 14:18

## 2023-02-28 ASSESSMENT — PAIN - FUNCTIONAL ASSESSMENT: PAIN_FUNCTIONAL_ASSESSMENT: NONE - DENIES PAIN

## 2023-02-28 ASSESSMENT — LIFESTYLE VARIABLES
HOW OFTEN DO YOU HAVE A DRINK CONTAINING ALCOHOL: MONTHLY OR LESS
HOW MANY STANDARD DRINKS CONTAINING ALCOHOL DO YOU HAVE ON A TYPICAL DAY: 1 OR 2

## 2023-02-28 NOTE — ED NOTES
POC Glucose reading of 66. Patient given 8 oz orange juice and 2 pks of anup crackers. Dr. Praveen Solis informed.      Johnnie Perez, RN  02/28/23 [id number]

## 2023-02-28 NOTE — ED PROVIDER NOTES
[address] [facility]        Pt Name: Leta Hairston  MRN: [id number]  [place] 1985  Date of evaluation: 2/28/2023  Provider: Stephania Patel MD  PCP: Markus Blackwell DO  Note Started: 1:51 PM EST 2/28/23    CHIEF COMPLAINT       Chief Complaint   Patient presents with    Dizziness     For about a week, sent home from work today for difficulty ambulating.  / per fiancee, pt fell out ofbed this morning     Aphasia     LKW last night / hx alcohol abuse, last drink a week ago - drank 1 pint        HISTORY OF PRESENT ILLNESS: 1 or more Elements   History From: Patient    Limitations to history : None    Leta Hairston is a 40 y.o. male who presents presents presents the emergency part with dizziness and ataxia that began when he woke up this morning. Last known well was last night. Patient is 71-year-old gentleman appears very disheveled. He states that he has been trying to quit drinking he does not drink every day he is only drank twice in the last month. He states last time he drink was 1 week ago. He is here with his mother. He went to work today after having some dizziness when he woke up and some ataxic gait. He said he stumbled and fell when he got out of bed no numbness or weakness to the arms or legs no aphasia no dysarthria no facial droop. Lives with his fiancée. He is able to get up and get himself ready to work went to work and he was stumbling around at work so they called and told him to go home. Patient denies any headache is not on any blood thinners denies any has had denies any neck pain or neck stiffness no recent nausea vomiting or diarrhea no fevers or chills. States he has not eaten today. No focal neurodeficits on examination. NIHSS 0     Nursing Notes were all reviewed and agreed with or any disagreements were addressed in the HPI.       REVIEW OF EXTERNAL NOTE :       none    REVIEW OF SYSTEMS :           Positives and Pertinent negatives as per HPI. SURGICAL HISTORY     Past Surgical History:   Procedure Laterality Date    COLONOSCOPY      WISDOM TOOTH EXTRACTION         CURRENTMEDICATIONS       Current Discharge Medication List        CONTINUE these medications which have NOT CHANGED    Details   ondansetron (ZOFRAN ODT) 4 MG disintegrating tablet Take 1 tablet by mouth every 8 hours as needed for Nausea or Vomiting  Qty: 10 tablet, Refills: 0      folic acid (FOLVITE) 1 MG tablet Take 1 tablet by mouth daily  Qty: 30 tablet, Refills: 5      thiamine 100 MG tablet Take 1 tablet by mouth daily  Qty: 30 tablet, Refills: 3      baclofen (LIORESAL) 10 MG tablet Take 1 tablet by mouth 3 times daily  Qty: 10 tablet, Refills: 0      PARoxetine (PAXIL) 10 MG tablet Take 12.5 mg by mouth every morning             ALLERGIES     Cipro xr    FAMILYHISTORY       Family History   Problem Relation Age of Onset    Diabetes Mother     Cancer Father     Asthma Sister         SOCIAL HISTORY       Social History     Tobacco Use    Smoking status: Some Days     Packs/day: 0.50     Types: Cigars, Cigarettes    Smokeless tobacco: Never    Tobacco comments:     Black & Milds    Vaping Use    Vaping Use: Never used   Substance Use Topics    Alcohol use:  Yes     Alcohol/week: 30.0 standard drinks     Types: 30 Shots of liquor per week     Comment: burbon    Drug use: Yes     Types: Marijuana (Weed)     Comment: daily       SCREENINGS        Remington Coma Scale  Eye Opening: Spontaneous  Best Verbal Response: Oriented  Best Motor Response: Obeys commands  Harvey Coma Scale Score: 15                CIWA Assessment  BP: 134/85  Heart Rate: 78  Nausea and Vomiting: no nausea and no vomiting  Tactile Disturbances: none  Tremor: moderate, with patient's arms extended  Auditory Disturbances: not present  Paroxysmal Sweats: barely perceptible sweating, palms moist  Visual Disturbances: not present  Anxiety: 3  Headache, Fullness in Head: none present  Agitation: normal activity  Orientation and Clouding of Sensorium: oriented and can do serial additions  CIWA-Ar Total: (!) 8  Clinical Opiate Withdrawal Scale Score: 9 (03/02/23 0741)        PHYSICAL EXAM  1 or more Elements     ED Triage Vitals [02/28/23 1313]   BP Temp Temp Source Heart Rate Resp SpO2 Height Weight   (!) 158/108 97.7 °F (36.5 °C) Oral (!) 106 16 100 % 5' 10\" (1.778 m) 130 lb (59 kg)                Constitutional/General: Alert and oriented x3; disheveled appearance, smell of alcohol on his breath  Head: Normocephalic and atraumatic  Eyes: PERRL, EOMI, conjunctiva normal, sclera non icteric  ENT:  Oropharynx clear, handling secretions, no trismus, no asymmetry of the posterior oropharynx or uvular edema  Neck: Supple, full ROM, no stridor, no meningeal signs  Respiratory: Lungs clear to auscultation bilaterally, no wheezes, rales, or rhonchi. Not in respiratory distress  Cardiovascular:  Regular rate. Regular rhythm. No murmurs, no gallops, no rubs. 2+ distal pulses. Equal extremity pulses. Chest: No chest wall tenderness  GI:  Abdomen Soft, Non tender, Non distended. No rebound, guarding, or rigidity. No pulsatile masses. Musculoskeletal: Moves all extremities x 4. Warm and well perfused, no clubbing, no cyanosis, no edema. Capillary refill <3 seconds  Integument: skin warm and dry. No rashes. Neurologic: GCS 15, no focal deficits, symmetric strength 5/5 in the upper and lower extremities bilaterally; no aphasia or dysarthria no facial droop; NIHSS 0  Psychiatric: Normal Affect      NIH Stroke Scale/Score at time of initial evaluation:  1A: Level of Consciousness 0 - alert; keenly responsive   1B: Ask Month and Age 0 - answers both questions correctly   1C:  Tell Patient To Open and Close Eyes, then Hand  Squeeze 0 - performs both tasks correctly   2: Test Horizontal Extraocular Movements 0 - normal   3: Test Visual Fields 0 - no visual loss   4: Test Facial Palsy 0 - normal symmetric movement   5A: Test Left Arm Motor Drift 0 - no drift, limb holds 90 (or 45) degrees for full 10 seconds   5B: Test Right Arm Motor Drift 0 - no drift, limb holds 90 (or 45) degrees for full 10 seconds   6A: Test Left Leg Motor Drift 0 - no drift; leg holds 30 degree position for full 5 seconds   6B: Test Right Leg Motor Drift 0 - no drift; leg holds 30 degree position for full 5 seconds   7: Test Limb Ataxia   (FNF/Heel-Shin) 0 - absent   8: Test Sensation 0 - normal; no sensory loss   9: Test Language/Aphasia 0 - no aphasia, normal   10: Test Dysarthria 0 - normal   11: Test Extinction/Inattention 0 - no abnormality   Total Score: 0   2/28/23 at 1:53 PM EST. Acute CVA Core Measures:      - t-PA Eligibility: IV t-PA was considered and not given due to violations in inclusion criteria including stroke onset was greater than 3 hours prior to presentation and mild/rapidly resolving deficit            DIAGNOSTIC RESULTS   LABS:      I have personally reviewed and interpreted all laboratory and imaging results for this patient. Results are listed below.      LABS:  Results for orders placed or performed during the hospital encounter of 02/28/23   CBC with Auto Differential   Result Value Ref Range    WBC 14.2 (H) 4.5 - 11.5 E9/L    RBC 5.08 3.80 - 5.80 E12/L    Hemoglobin 16.4 12.5 - 16.5 g/dL    Hematocrit 48.3 37.0 - 54.0 %    MCV 95.1 80.0 - 99.9 fL    MCH 32.3 26.0 - 35.0 pg    MCHC 34.0 32.0 - 34.5 %    RDW 15.9 (H) 11.5 - 15.0 fL    Platelets 706 112 - 992 E9/L    MPV 11.5 7.0 - 12.0 fL    Neutrophils % 82.6 (H) 43.0 - 80.0 %    Immature Granulocytes % 0.4 0.0 - 5.0 %    Lymphocytes % 12.8 (L) 20.0 - 42.0 %    Monocytes % 3.0 2.0 - 12.0 %    Eosinophils % 0.3 0.0 - 6.0 %    Basophils % 0.9 0.0 - 2.0 %    Neutrophils Absolute 11.68 (H) 1.80 - 7.30 E9/L    Immature Granulocytes # 0.06 E9/L    Lymphocytes Absolute 1.81 1.50 - 4.00 E9/L    Monocytes Absolute 0.43 0.10 - 0.95 E9/L    Eosinophils Absolute 0.04 (L) 0.05 - 0.50 E9/L Basophils Absolute 0.13 0.00 - 0.20 E9/L   Comprehensive Metabolic Panel   Result Value Ref Range    Sodium 145 132 - 146 mmol/L    Potassium 4.6 3.5 - 5.0 mmol/L    Chloride 102 98 - 107 mmol/L    CO2 20 (L) 22 - 29 mmol/L    Anion Gap 23 (H) 7 - 16 mmol/L    Glucose 68 (L) 74 - 99 mg/dL    BUN 9 6 - 20 mg/dL    Creatinine 0.8 0.7 - 1.2 mg/dL    Est, Glom Filt Rate >60 >=60 mL/min/1.73    Calcium 9.4 8.6 - 10.2 mg/dL    Total Protein 7.3 6.4 - 8.3 g/dL    Albumin 4.5 3.5 - 5.2 g/dL    Total Bilirubin 0.4 0.0 - 1.2 mg/dL    Alkaline Phosphatase 66 40 - 129 U/L    ALT 14 0 - 40 U/L    AST 35 0 - 39 U/L   Troponin   Result Value Ref Range    Troponin, High Sensitivity <6 0 - 11 ng/L   Serum Drug Screen   Result Value Ref Range    Ethanol Lvl 272 mg/dL    Acetaminophen Level <5.0 (L) 10.0 - 54.0 mcg/mL    Salicylate, Serum <9.6 0.0 - 30.0 mg/dL    TCA Scrn NEGATIVE Cutoff:300 ng/mL   Lactate, Sepsis   Result Value Ref Range    Lactic Acid, Sepsis 5.8 (HH) 0.5 - 1.9 mmol/L   Basic Metabolic Panel   Result Value Ref Range    Sodium 142 132 - 146 mmol/L    Potassium 3.9 3.5 - 5.0 mmol/L    Chloride 105 98 - 107 mmol/L    CO2 19 (L) 22 - 29 mmol/L    Anion Gap 18 (H) 7 - 16 mmol/L    Glucose 52 (L) 74 - 99 mg/dL    BUN 8 6 - 20 mg/dL    Creatinine 0.7 0.7 - 1.2 mg/dL    Est, Glom Filt Rate >60 >=60 mL/min/1.73    Calcium 8.2 (L) 8.6 - 10.2 mg/dL   Lactic Acid   Result Value Ref Range    Lactic Acid 2.7 (H) 0.5 - 2.2 mmol/L   POCT Glucose   Result Value Ref Range    Glucose 66 mg/dL    QC OK? ok    POCT Glucose   Result Value Ref Range    Meter Glucose 66 (L) 74 - 99 mg/dL   POCT Glucose   Result Value Ref Range    Meter Glucose 113 (H) 74 - 99 mg/dL   POCT Glucose   Result Value Ref Range    Meter Glucose 60 (L) 74 - 99 mg/dL   POCT Glucose   Result Value Ref Range    Meter Glucose 103 (H) 74 - 99 mg/dL   POCT Glucose   Result Value Ref Range    Meter Glucose 72 (L) 74 - 99 mg/dL   POCT Glucose   Result Value Ref Range Meter Glucose 150 (H) 74 - 99 mg/dL   POCT Glucose   Result Value Ref Range    Meter Glucose 93 74 - 99 mg/dL   EKG 12 Lead   Result Value Ref Range    Ventricular Rate 75 BPM    Atrial Rate 75 BPM    P-R Interval 130 ms    QRS Duration 104 ms    Q-T Interval 412 ms    QTc Calculation (Bazett) 460 ms    P Axis 47 degrees    R Axis 85 degrees    T Axis 72 degrees             EKG Interpretation  Interpreted by emergency department physician, Lindsay Patel MD      EKG showed normal sinus rhythm at 75 beats minute no signs of ST changes no ST elevation QTc 460. No change from prior EKG. EKG interpreted myself        RADIOLOGY:   Non-plain film images such as CT, Ultrasound and MRI are read by the radiologist. Plain radiographic images are visualized and preliminarily interpreted by the ED Provider with the below findings:    Chest x-ray shows no focal infiltrate. Interpretation per the Radiologist below, if available at the time of this note:    CTA NECK W CONTRAST   Final Result   Addendum (preliminary) 1 of 1   ADDENDUM:   Findings were communicated to Dr. Olga Edwards by the CORE team on 2/28/2023 at   3:58 pm.         Final   1. No acute intracranial abnormality. These results were sent to the Results   [address] ([address] [facility]) on 2/28/2023 at 3:50 pm to be communicated to the   referring/covering health care provider/office. 2. No perfusion mismatch. 3. No evidence of dissection or flow limiting stenosis of the cervical   carotid/vertebral arteries. 4. No significant stenosis or large vessel occlusion of the [id number]-jj-[id number]. CTA HEAD W CONTRAST   Final Result   Addendum (preliminary) 1 of 1   ADDENDUM:   Findings were communicated to Dr. Olga Edwards by the CORE team on 2/28/2023 at   3:58 pm.         Final   1. No acute intracranial abnormality.  These results were sent to the Results   [address] ([address]) on 2/28/2023 at 3:50 pm to be communicated to the   referring/covering health care provider/office. 2. No perfusion mismatch. 3. No evidence of dissection or flow limiting stenosis of the cervical   carotid/vertebral arteries. 4. No significant stenosis or large vessel occlusion of the mnvkrf-fg-Llwxli. CT BRAIN PERFUSION   Final Result   Addendum (preliminary) 1 of 1   ADDENDUM:   Findings were communicated to Dr. Mala Wells by the CORE team on 2/28/2023 at   3:58 pm.         Final   1. No acute intracranial abnormality. These results were sent to the Results   [address] ([employer] [facility]) on 2/28/2023 at 3:50 pm to be communicated to the   referring/covering health care provider/office. 2. No perfusion mismatch. 3. No evidence of dissection or flow limiting stenosis of the cervical   carotid/vertebral arteries. 4. No significant stenosis or large vessel occlusion of the vxvvxy-jt-Lewtzh. CT HEAD WO CONTRAST   Final Result   Addendum (preliminary) 1 of 1   ADDENDUM:   Findings were communicated to Dr. Mala Wells by the CORE team on 2/28/2023 at   3:58 pm.         Final   1. No acute intracranial abnormality. These results were sent to the Results   [address] ([employer] [facility]) on 2/28/2023 at 3:50 pm to be communicated to the   referring/covering health care provider/office. 2. No perfusion mismatch. 3. No evidence of dissection or flow limiting stenosis of the cervical   carotid/vertebral arteries. 4. No significant stenosis or large vessel occlusion of the bpkvlb-qa-Nmtyvr. XR CHEST PORTABLE   Final Result   No acute process. No results found. No results found. PROCEDURES   Unless otherwise noted below, none          CRITICAL CARE TIME (.cct)   none    PAST MEDICAL HISTORY/Chronic Conditions Affecting Care      has a past medical history of Benign essential tremor and Tremor.      EMERGENCY DEPARTMENT COURSE    Vitals:    Vitals:    03/01/23 1956 03/02/23 0049 03/02/23 0315 03/02/23 0745   BP: (!) 140/84  134/85    Pulse: 80  78    Resp: 16    Temp:   97.9 °F (36.6 °C)    TempSrc:   Oral    SpO2:   97% 94%   Weight:  136 lb (61.7 kg)     Height:           Patient was given the following medications:  Medications   dextrose 5 % solution ( IntraVENous Canceled Entry 3/1/23 0751)   thiamine tablet 100 mg (100 mg Oral Given 3/2/23 0749)   sodium chloride flush 0.9 % injection 5-40 mL (10 mLs IntraVENous Given 3/2/23 0749)   sodium chloride flush 0.9 % injection 5-40 mL (10 mLs IntraVENous Given 3/2/23 0046)   0.9 % sodium chloride infusion (has no administration in time range)   LORazepam (ATIVAN) tablet 1 mg (1 mg Oral Given 3/2/23 0749)     Or   LORazepam (ATIVAN) injection 1 mg ( IntraVENous See Alternative 3/2/23 0749)     Or   LORazepam (ATIVAN) tablet 2 mg ( Oral See Alternative 3/2/23 0749)     Or   LORazepam (ATIVAN) injection 2 mg ( IntraVENous See Alternative 3/2/23 0749)     Or   LORazepam (ATIVAN) tablet 3 mg ( Oral See Alternative 3/2/23 0749)     Or   LORazepam (ATIVAN) injection 3 mg ( IntraVENous See Alternative 3/2/23 0749)     Or   LORazepam (ATIVAN) tablet 4 mg ( Oral See Alternative 3/2/23 0749)     Or   LORazepam (ATIVAN) injection 4 mg ( IntraVENous See Alternative 1/6/33 9709)   folic acid (FOLVITE) tablet 1 mg (1 mg Oral Given 3/2/23 0749)   glucose chewable tablet 16 g (has no administration in time range)   dextrose bolus 10% 125 mL (has no administration in time range)     Or   dextrose bolus 10% 250 mL (has no administration in time range)   glucagon (rDNA) injection 1 mg (has no administration in time range)   0.9 % sodium chloride bolus (0 mLs IntraVENous Stopped 2/28/23 1607)   iopamidol (ISOVUE-370) 76 % injection 100 mL (100 mLs IntraVENous Given 2/28/23 1528)   0.9 % sodium chloride bolus (0 mLs IntraVENous Stopped 2/28/23 1846)   0.9 % sodium chloride bolus (0 mLs IntraVENous Stopped 2/28/23 2057)               Medical Decision Making/Differential Diagnosis:    CC/HPI Summary, Social Determinants of health, Records Reviewed, DDx, testing done/not done, ED Course, Reassessment, disposition considerations/shared decision making with patient, consults, disposition:        Vital signs upon arrival /85   Pulse 78   Temp 97.9 °F (36.6 °C) (Oral)   Resp 16   Ht 5' 10\" (1.778 m)   Wt 136 lb (61.7 kg)   SpO2 94%   BMI 19.51 kg/m²     Elaine Vera is a 40 y.o. male who presents to the ED for unsteady gait. Patient states since he got out of bed this morning his gait has been he stumbled out of bed and was stumbling at work so they sent him home. He has a history of alcohol abuse. He states that the last time he drank was 1 week ago and his mother is at bedside and is questioning that. Patient denies hitting his head or any loss of consciousness not on any blood thinners. Denies any nausea or vomiting today. No aphasia no dysarthria no facial droop nonfocal neuro exam.  Patient is disheveled. Significantly smells of alcohol. Patient had lab work checked electrolytes CT imaging to rule out stroke. Patient be given IV fluids. Differential diagnosis (includes but not limited to):  Dehydration JUDD electrolyte abnormality orthostatic hypotension alcohol abuse alcohol intoxication stroke CVA TIA intracranial abnormality or bleed fall trauma      Chronic conditions:  has a past medical history of Benign essential tremor and Tremor. ED Course Summary:(labs and imaging reviewed, interventions, reassessment, consults,shared decision making with patient, disposition)      Patient was mildly tachycardic on arrival was given a liter of IV fluids. EKG showed normal sinus rhythm at 75 per minute no signs of any ST elevation. Patient had a chemistry that showed glucose of 68 anion gap of 23 CO2 of 20. Potassium was normal.  He was given a second liter of IV fluids was fed in the emergency department lactic acid was 2. 7. CBC showed a white count of 14.2 stable hemoglobin 16.4.   LFTs were normal troponin was negative lactic acid was markedly elevated 5.8. Tylenol salicylate were negative but alcohol level was elevated at 272. Patient not showing any signs of withdrawal.  Chest x-ray showed no acute process no infiltrate. No aspiration. CT head showed no acute intracranial abnormalities CT perfusion showed no perfusion mismatch. CTA of the head and neck showed no evidence of dissection no large vessel occlusion. No signs of stroke. I do feel her symptoms are likely related to the elevated alcohol level . Spoke more extensively with the patient and he is reluctant to speak in detail about his alcohol abuse but admits that he has been drinking. I offered him social work and rehab resources he declined at this time. Orthostatics were checked and they were positive. Patient was given further IV fluids. He was Reassessed and found to still be orthostatic positive after further fluids. Repeat chemistry still shows slightly elevated anion gap of 18 glucose was 52 he was given juice blood sugar increased to 100. Spoke to patient's PCP is agreeable to admit the patient to be admitted with a [facility] protocol. No signs of withdrawal in the emergency department. Patient be admitted to monitored bed for alcohol abuse dehydration lactic acidosis. Patient stable for admission. Social determinants affecting disposition: Patient's mother is here with him. Patient is agreeable to admission. Patient was offered social work consult for possible rehab placement he declined at this time. ED Course as of 03/02/23 0803   Tue Feb 28, 2023 1919 Patient not forthcoming regarding his drinking but does not that he has been drinking recently. He is I offered him rehab placement he does not want to go to rehab at this time. We will repeat chemistry and lactic acid and ambulate the patient make sure he has a steady gait before discharge. [KK]   2055 Statics were positive and becomes markedly tachycardic.   Blood sugar was rechecked and it was 60 will feed the patient. Patient likely to be admitted [KK]   2103 Spoke to Dr. Vivek Paez who agreed to admit to monitored bed. CIWA protocol will be initiated [KK]      ED Course User Evie Willson MD          CONSULTS: (Who and What was discussed)  IP CONSULT TO INTERNAL MEDICINE  IP CONSULT TO SOCIAL WORK  IP CONSULT TO SOCIAL WORK  IP CONSULT TO IV TEAM  See ED course      I am the Primary Clinician of Record. FINAL IMPRESSION      1. Alcohol abuse    2. Dehydration    3. Acute alcoholic intoxication without complication ([facility] [address]    4. Lactic acidosis    5. Hypotension, unspecified hypotension type          DISPOSITION/PLAN     DISPOSITION Admitted 02/28/2023 09:06:44 PM      PATIENT REFERRED TO:  No follow-up provider specified.     DISCHARGE MEDICATIONS:  Current Discharge Medication List          DISCONTINUED MEDICATIONS:  Current Discharge Medication List                 (Please note that portions of this note were completed with a voice recognition program.  Efforts were made to edit the dictations but occasionally words are mis-transcribed.)    Nikos Gonzalez MD (electronically signed)            Nikos Gonzalez MD  03/02/23 5704

## 2023-03-01 LAB
METER GLUCOSE: 103 MG/DL (ref 74–99)
METER GLUCOSE: 150 MG/DL (ref 74–99)
METER GLUCOSE: 72 MG/DL (ref 74–99)
METER GLUCOSE: 93 MG/DL (ref 74–99)

## 2023-03-01 PROCEDURE — 6370000000 HC RX 637 (ALT 250 FOR IP): Performed by: INTERNAL MEDICINE

## 2023-03-01 PROCEDURE — 82962 GLUCOSE BLOOD TEST: CPT

## 2023-03-01 PROCEDURE — 84681 ASSAY OF C-PEPTIDE: CPT

## 2023-03-01 PROCEDURE — 2580000003 HC RX 258: Performed by: INTERNAL MEDICINE

## 2023-03-01 PROCEDURE — 2060000000 HC ICU INTERMEDIATE R&B

## 2023-03-01 RX ORDER — LORAZEPAM 1 MG/1
1 TABLET ORAL
Status: DISCONTINUED | OUTPATIENT
Start: 2023-03-01 | End: 2023-03-03 | Stop reason: HOSPADM

## 2023-03-01 RX ORDER — LORAZEPAM 1 MG/1
3 TABLET ORAL
Status: DISCONTINUED | OUTPATIENT
Start: 2023-03-01 | End: 2023-03-03 | Stop reason: HOSPADM

## 2023-03-01 RX ORDER — SODIUM CHLORIDE 0.9 % (FLUSH) 0.9 %
5-40 SYRINGE (ML) INJECTION PRN
Status: DISCONTINUED | OUTPATIENT
Start: 2023-03-01 | End: 2023-03-03 | Stop reason: HOSPADM

## 2023-03-01 RX ORDER — SODIUM CHLORIDE 9 MG/ML
INJECTION, SOLUTION INTRAVENOUS PRN
Status: DISCONTINUED | OUTPATIENT
Start: 2023-03-01 | End: 2023-03-03 | Stop reason: HOSPADM

## 2023-03-01 RX ORDER — SODIUM CHLORIDE, SODIUM LACTATE, POTASSIUM CHLORIDE, CALCIUM CHLORIDE 600; 310; 30; 20 MG/100ML; MG/100ML; MG/100ML; MG/100ML
INJECTION, SOLUTION INTRAVENOUS CONTINUOUS
Status: DISCONTINUED | OUTPATIENT
Start: 2023-03-01 | End: 2023-03-02

## 2023-03-01 RX ORDER — LANOLIN ALCOHOL/MO/W.PET/CERES
100 CREAM (GRAM) TOPICAL DAILY
Status: DISCONTINUED | OUTPATIENT
Start: 2023-03-01 | End: 2023-03-03 | Stop reason: HOSPADM

## 2023-03-01 RX ORDER — LORAZEPAM 2 MG/ML
1 INJECTION INTRAMUSCULAR
Status: DISCONTINUED | OUTPATIENT
Start: 2023-03-01 | End: 2023-03-03 | Stop reason: HOSPADM

## 2023-03-01 RX ORDER — DEXTROSE MONOHYDRATE 100 MG/ML
INJECTION, SOLUTION INTRAVENOUS CONTINUOUS PRN
Status: DISCONTINUED | OUTPATIENT
Start: 2023-03-01 | End: 2023-03-01

## 2023-03-01 RX ORDER — SODIUM CHLORIDE 0.9 % (FLUSH) 0.9 %
5-40 SYRINGE (ML) INJECTION EVERY 12 HOURS SCHEDULED
Status: DISCONTINUED | OUTPATIENT
Start: 2023-03-01 | End: 2023-03-03 | Stop reason: HOSPADM

## 2023-03-01 RX ORDER — DEXTROSE MONOHYDRATE 50 MG/ML
INJECTION, SOLUTION INTRAVENOUS CONTINUOUS
Status: DISCONTINUED | OUTPATIENT
Start: 2023-03-01 | End: 2023-03-03 | Stop reason: HOSPADM

## 2023-03-01 RX ORDER — LORAZEPAM 1 MG/1
4 TABLET ORAL
Status: DISCONTINUED | OUTPATIENT
Start: 2023-03-01 | End: 2023-03-03 | Stop reason: HOSPADM

## 2023-03-01 RX ORDER — FOLIC ACID 1 MG/1
1 TABLET ORAL DAILY
Status: DISCONTINUED | OUTPATIENT
Start: 2023-03-01 | End: 2023-03-03 | Stop reason: HOSPADM

## 2023-03-01 RX ORDER — LORAZEPAM 2 MG/ML
2 INJECTION INTRAMUSCULAR
Status: DISCONTINUED | OUTPATIENT
Start: 2023-03-01 | End: 2023-03-03 | Stop reason: HOSPADM

## 2023-03-01 RX ORDER — LORAZEPAM 2 MG/ML
4 INJECTION INTRAMUSCULAR
Status: DISCONTINUED | OUTPATIENT
Start: 2023-03-01 | End: 2023-03-03 | Stop reason: HOSPADM

## 2023-03-01 RX ORDER — LORAZEPAM 2 MG/ML
3 INJECTION INTRAMUSCULAR
Status: DISCONTINUED | OUTPATIENT
Start: 2023-03-01 | End: 2023-03-03 | Stop reason: HOSPADM

## 2023-03-01 RX ORDER — LORAZEPAM 1 MG/1
2 TABLET ORAL
Status: DISCONTINUED | OUTPATIENT
Start: 2023-03-01 | End: 2023-03-03 | Stop reason: HOSPADM

## 2023-03-01 RX ADMIN — LORAZEPAM 1 MG: 1 TABLET ORAL at 15:12

## 2023-03-01 RX ADMIN — FOLIC ACID 1 MG: 1 TABLET ORAL at 10:17

## 2023-03-01 RX ADMIN — LORAZEPAM 1 MG: 1 TABLET ORAL at 10:16

## 2023-03-01 RX ADMIN — SODIUM CHLORIDE, POTASSIUM CHLORIDE, SODIUM LACTATE AND CALCIUM CHLORIDE: 600; 310; 30; 20 INJECTION, SOLUTION INTRAVENOUS at 15:39

## 2023-03-01 RX ADMIN — SODIUM CHLORIDE, PRESERVATIVE FREE 10 ML: 5 INJECTION INTRAVENOUS at 15:42

## 2023-03-01 RX ADMIN — Medication 100 MG: at 10:17

## 2023-03-01 RX ADMIN — LORAZEPAM 1 MG: 1 TABLET ORAL at 20:09

## 2023-03-01 RX ADMIN — DEXTROSE MONOHYDRATE: 50 INJECTION, SOLUTION INTRAVENOUS at 05:42

## 2023-03-01 RX ADMIN — LORAZEPAM 1 MG: 1 TABLET ORAL at 06:11

## 2023-03-01 RX ADMIN — SODIUM CHLORIDE, PRESERVATIVE FREE 10 ML: 5 INJECTION INTRAVENOUS at 10:17

## 2023-03-01 RX ADMIN — LORAZEPAM 1 MG: 1 TABLET ORAL at 11:49

## 2023-03-01 ASSESSMENT — PAIN - FUNCTIONAL ASSESSMENT: PAIN_FUNCTIONAL_ASSESSMENT: NONE - DENIES PAIN

## 2023-03-01 NOTE — PROGRESS NOTES
P Quality Flow/Interdisciplinary Rounds Progress Note        Quality Flow Rounds held on March 1, 2023    Disciplines Attending:  Bedside Nurse, , , and Nursing Unit Leadership    Mora Pastrana was admitted on 2/28/2023  1:19 PM    Anticipated Discharge Date:       Disposition:    Charles Score:  Charles Scale Score: 19    Readmission Risk              Risk of Unplanned Readmission:  12           Discussed patient goal for the day, patient clinical progression, and barriers to discharge.   The following Goal(s) of the Day/Commitment(s) have been identified:   ciwa, iv fluids       Bon Patel RN  March 1, 2023

## 2023-03-01 NOTE — ED NOTES
Patient's bed is ready: room 635: VSS (see flow sheet): No longer diaphoretic. Glucose 72: patient asymptomatic but given encouraged to eat. Patient does not want to eat but stated would drink 1-2 cans of gingerale. 21carbs/can. Ready for transfer.      Valdemar De La Rosa RN  03/01/23 2991

## 2023-03-01 NOTE — ED NOTES
Repeat : patient ready for transfer to room 635 as soon as room is ready.      Kirti Lowery RN  03/01/23 0966

## 2023-03-01 NOTE — ED NOTES
Heart rate elevated with any activity: including going from lying down to sitting up. Diaphoretic: BS 60: See orthostatic BP. Dr. Getachew Irby aware and patient given food: will continue to assess: plan to admit patient for severe dehydration.      Pavithra Felton RN  02/28/23 2100

## 2023-03-01 NOTE — CARE COORDINATION
ETOH abuse. CM made in room visit to pt and explained role of CM w/initial assessment completed. Pt states he resides w/his fiance in a two story home, three steps to enter and is independent w/o the use of any DMEs. He still drives and is established w/Dr. Vivek Paez and uses [facility] in [facility]. No home O2, cpap or nebulizer. No hx of HHC or SNF stay. Pt is active w/[facility] and agreeable to speak w/[facility]. Pt will return home upon discharge w/fiance to transport. CM will follow along with  and assist with discharge planning as necessary. Case Management Assessment  Initial Evaluation    Date/Time of Evaluation: 3/1/2023 9:39 AM  Assessment Completed by: Kerry Martin RN    If patient is discharged prior to next notation, then this note serves as note for discharge by case management. Patient Name: Kendra Hilton                   YOB: 1985  Diagnosis: Dehydration [E86.0]  Lactic acidosis [E87.20]  Alcohol abuse [Q42.75]  Acute alcoholic intoxication without complication ([facility] [address] [Z11.864]  Hypotension, unspecified hypotension type [I95.9]                   Date / Time: 2/28/2023  1:19 PM    Patient Admission Status: Inpatient   Readmission Risk (Low < 19, Mod (19-27), High > 27): Readmission Risk Score: 11.3    Current PCP: Arie Tadeo, DO  PCP verified by CM? Yes    Chart Reviewed: Yes      History Provided by: Patient  Patient Orientation: Alert and Oriented    Patient Cognition: Alert    Hospitalization in the last 30 days (Readmission):  No    If yes, Readmission Assessment in CM Navigator will be completed.     Advance Directives:      Code Status: Full Code   Patient's Primary Decision Maker is: Legal Next of Kin      Discharge Planning:    Patient lives with: Spouse/Significant Other Type of Home: House  Primary Care Giver: Self  Patient Support Systems include: Spouse/Significant Other   Current Financial resources:    Current community resources: Current services prior to admission: None            Current DME:              Type of Home Care services:  None    ADLS  Prior functional level: Independent in ADLs/IADLs  Current functional level: Independent in ADLs/IADLs    PT AM-PAC:   /24: Independent  OT AM-PAC:   /24: Independent    Family can provide assistance at DC: Yes  Would you like Case Management to discuss the discharge plan with any other family members/significant others, and if so, who? No  Plans to Return to Present Housing: Yes  Other Identified Issues/Barriers to RETURNING to current housing: NA  Potential Assistance needed at discharge: Other (Comment) (rehab?)            Potential DME:    Patient expects to discharge to: [address] [place] [address] for transportation at discharge:      Financial    Payor: Cheyanne Russell / Plan: Skyler / Product Type: *No Product type* /     Does insurance require precert for SNF: Yes    Potential assistance Purchasing Medications:    Meds-to-Beds request: Yes      [address] - [facility], [facility] [address]  [address]  Phone: Via "[employer] Fax: Via "[employer]      Notes:    Factors facilitating achievement of predicted outcomes: Family support, Cooperative, and Pleasant    Barriers to discharge: CIWA protocol    Additional Case Management Notes: Pt will return. The Plan for Transition of Care is related to the following treatment goals of Dehydration [E86.0]  Lactic acidosis [E87.20]  Alcohol abuse [K22.34]  Acute alcoholic intoxication without complication (HCC) [E53.949]  Hypotension, unspecified hypotension type [N90.7]    IF APPLICABLE: The Patient and/or patient representative Bryn Ocampo and his family were provided with a choice of provider and agrees with the discharge plan.  Freedom of choice list with basic dialogue that supports the patient's individualized plan of care/goals and shares the quality data associated with the providers was provided to:     Patient Representative Name:       The Patient and/or Patient Representative Agree with the Discharge Plan?     RACHELE Leyva, RN  [facility] Case Management  [phone]

## 2023-03-01 NOTE — CARE COORDINATION
[facility] Support Note    Name: Sera Ruth  Date: 3/1/2023    Chief Complaint   Patient presents with    Dizziness     For about a week, sent home from work today for difficulty ambulating.  / per cristobal, pt fell out ofbed this morning     Aphasia     LKW last night / hx alcohol abuse, last drink a week ago - drank 1 pint        Peer Support met with patient. [] Support and education provided  [] Resources provided   [] Treatment referral:   [] Other:   [] Patient declined peer recovery services     Referred By: HARSHA OCONNELL Notes: Patient was withdrawing and probably will not remember 1/2 our conversation we had earlier. Peer did ask about him being in counseling at [facility]. Peer did share that doesn't seem to be working for him, he did agree to that. Peer will revisit patient to discuss more tomorrow.     [facility], 3/1/2023

## 2023-03-01 NOTE — ED NOTES
ED to Inpatient Handoff Report    Notified Maura Buckner that electronic handoff available and patient ready for transport to room 635. Safety Risks: None identified    Patient in Restraints: no    Constant Observer or Patient : no    Telemetry Monitoring Ordered :Yes           Order to transfer to unit without monitor:NO    Last MEWS: 1 Time completed: 0415    Vitals:    02/28/23 1928 02/28/23 2045 03/01/23 0010 03/01/23 0400   BP: 127/68  (!) 140/82 (!) 162/79   Pulse: 68  84 78   Resp: 16 22 16 18   Temp:   98.4 °F (36.9 °C) 98.4 °F (36.9 °C)   TempSrc:   Oral Oral   SpO2: 98% 95% 95% 95%   Weight:       Height:           Opportunity for questions and clarification was provided. Patient and patient's mother (sitting at bedside) aware that we need a urinalysis: patient walked to BR x1 and did not take the specimen cup: this RN was in another patient room and unaware that the patient had gone to the BR.  \"Still need a Urine specimen\"       Jamal Lopez RN  03/01/23 [id number]

## 2023-03-01 NOTE — H&P
CHIEF COMPLAINT:  alcohol intoxication and withdrawl      HISTORY OF PRESENT ILLNESS:      The patient is a 40 y.o. male patient of presents from work with inability to walk. He denied drinking in ER, ut admits to me is drinking quite a bit. He came to ER with alcohol level of 272. He has significant baseline essential tremor. Now becoming confused and shaking at times. he has prior history of admission for alcohol abuse    Past Medical History:    Past Medical History:   Diagnosis Date    Benign essential tremor     Tremor        Past Surgical History:    Past Surgical History:   Procedure Laterality Date    COLONOSCOPY      WISDOM TOOTH EXTRACTION         Medications Prior to Admission:    Medications Prior to Admission: ondansetron (ZOFRAN ODT) 4 MG disintegrating tablet, Take 1 tablet by mouth every 8 hours as needed for Nausea or Vomiting (Patient not taking: Reported on 5/2/8455)  folic acid (FOLVITE) 1 MG tablet, Take 1 tablet by mouth daily (Patient not taking: Reported on 3/1/2023)  thiamine 100 MG tablet, Take 1 tablet by mouth daily (Patient not taking: Reported on 3/1/2023)  baclofen (LIORESAL) 10 MG tablet, Take 1 tablet by mouth 3 times daily  PARoxetine (PAXIL) 10 MG tablet, Take 12.5 mg by mouth every morning    Allergies:    Cipro xr    Social History:    reports that he has been smoking cigars. He has been smoking an average of .5 packs per day. He has never used smokeless tobacco. He reports current alcohol use of about 30.0 standard drinks per week. He reports current drug use. Drug: Marijuana Tanesha Theba). Family History:   family history includes Asthma in his sister; Cancer in his father; Diabetes in his mother. REVIEW OF SYSTEMS:  As above in the HPI, otherwise negative    PHYSICAL EXAM:    Vitals:  BP (!) 158/84   Pulse 74   Temp 98.2 °F (36.8 °C) (Oral)   Resp 18   Ht 5' 10\" (1.778 m)   Wt 130 lb (59 kg)   SpO2 93%   BMI 18.65 kg/m²     General:  Awake, alert, oriented X 3.   Well developed, well nourished, well groomed. No apparent distress. HEENT:  Normocephalic, atraumatic. Pupils equal, round, reactive to light. No scleral icterus. No conjunctival injection. Normal lips, teeth, and gums. No nasal discharge. Neck:  Supple  Heart:  RRR, no murmurs, gallops, rubs  Lungs:  CTA bilaterally, bilat symmetrical expansion, no wheeze, rales, or rhonchi  Abdomen:   Bowel sounds present, soft, nontender, no masses, no organomegaly, no peritoneal signs  Extremities:  No clubbing, cyanosis, or edema  Skin:  Warm and dry, no open lesions or rash  Neuro:  Cranial nerves 2-12 intact, no focal deficits  Breast: deferred  Rectal: deferred  Genitalia:  deferred    LABS:  CBC with Differential:    Lab Results   Component Value Date/Time    WBC 14.2 02/28/2023 01:59 PM    RBC 5.08 02/28/2023 01:59 PM    HGB 16.4 02/28/2023 01:59 PM    HCT 48.3 02/28/2023 01:59 PM     02/28/2023 01:59 PM    MCV 95.1 02/28/2023 01:59 PM    MCH 32.3 02/28/2023 01:59 PM    MCHC 34.0 02/28/2023 01:59 PM    RDW 15.9 02/28/2023 01:59 PM    LYMPHOPCT 12.8 02/28/2023 01:59 PM    MONOPCT 3.0 02/28/2023 01:59 PM    BASOPCT 0.9 02/28/2023 01:59 PM    MONOSABS 0.43 02/28/2023 01:59 PM    LYMPHSABS 1.81 02/28/2023 01:59 PM    EOSABS 0.04 02/28/2023 01:59 PM    BASOSABS 0.13 02/28/2023 01:59 PM     CMP:    Lab Results   Component Value Date/Time     02/28/2023 07:18 PM    K 3.9 02/28/2023 07:18 PM    K 4.3 05/13/2022 08:20 AM     02/28/2023 07:18 PM    CO2 19 02/28/2023 07:18 PM    BUN 8 02/28/2023 07:18 PM    CREATININE 0.7 02/28/2023 07:18 PM    GFRAA >60 05/15/2022 07:20 AM    LABGLOM >60 02/28/2023 07:18 PM    GLUCOSE 52 02/28/2023 07:18 PM    PROT 7.3 02/28/2023 01:59 PM    LABALBU 4.5 02/28/2023 01:59 PM    CALCIUM 8.2 02/28/2023 07:18 PM    BILITOT 0.4 02/28/2023 01:59 PM    ALKPHOS 66 02/28/2023 01:59 PM    AST 35 02/28/2023 01:59 PM    ALT 14 02/28/2023 01:59 PM     PT/INR:    Lab Results   Component Value Date/Time    PROTIME 10.2 05/13/2022 08:20 AM    INR 0.9 05/13/2022 08:20 AM     @cktotal:3,ckmb:3,ckmbindex:3,troponini:3@  U/A:    Lab Results   Component Value Date/Time    NITRU Negative 11/25/2022 05:17 PM    COLORU DARK YELLOW 11/25/2022 05:17 PM    PHUR 6.0 11/25/2022 05:17 PM    WBCUA 0-1 11/25/2022 05:17 PM    RBCUA 0-1 11/25/2022 05:17 PM    BACTERIA NONE SEEN 11/25/2022 05:17 PM    CLARITYU Clear 11/25/2022 05:17 PM    SPECGRAV 1.020 11/25/2022 05:17 PM    UROBILINOGEN 1.0 11/25/2022 05:17 PM    BILIRUBINUR MODERATE 11/25/2022 05:17 PM    BLOODU Negative 11/25/2022 05:17 PM    GLUCOSEU Negative 11/25/2022 05:17 PM    KETUA >=80 11/25/2022 05:17 PM          ASSESSMENT:      Acute alchol intoxication  dT's  Essential tremor  hypoglycemia      PLAN:    Acute alcohol intoxication with DT's  Prior history of same  Will place on withdrawal protocol  Observe close  Social service  Thiamine    Hypoglycemia  Will switch fluids to D5  Watch lytes    Essential tremor  Difficult at times to distinguish this from DT  Caution with over using the ativan for tremor only  Harlan Madrigal DO  6:10 AM  3/1/2023

## 2023-03-01 NOTE — PROGRESS NOTES
Pt arrived on unit with ER nurse and parent at bedside. Med list complete. Call placed to Dr. Osorio Yuen answering service at [facility] for admission orders. Pt is alert and oriented, answering questions appropriately.  Initial CIWA score 9

## 2023-03-02 PROCEDURE — 2580000003 HC RX 258: Performed by: INTERNAL MEDICINE

## 2023-03-02 PROCEDURE — 2060000000 HC ICU INTERMEDIATE R&B

## 2023-03-02 PROCEDURE — 6370000000 HC RX 637 (ALT 250 FOR IP): Performed by: INTERNAL MEDICINE

## 2023-03-02 RX ADMIN — SODIUM CHLORIDE, PRESERVATIVE FREE 10 ML: 5 INJECTION INTRAVENOUS at 20:00

## 2023-03-02 RX ADMIN — LORAZEPAM 1 MG: 1 TABLET ORAL at 07:49

## 2023-03-02 RX ADMIN — LORAZEPAM 1 MG: 1 TABLET ORAL at 20:06

## 2023-03-02 RX ADMIN — SODIUM CHLORIDE, PRESERVATIVE FREE 10 ML: 5 INJECTION INTRAVENOUS at 07:49

## 2023-03-02 RX ADMIN — FOLIC ACID 1 MG: 1 TABLET ORAL at 07:49

## 2023-03-02 RX ADMIN — Medication 100 MG: at 07:49

## 2023-03-02 RX ADMIN — SODIUM CHLORIDE, PRESERVATIVE FREE 10 ML: 5 INJECTION INTRAVENOUS at 00:46

## 2023-03-02 RX ADMIN — LORAZEPAM 1 MG: 1 TABLET ORAL at 13:02

## 2023-03-02 NOTE — PROGRESS NOTES
Patient and mom discussed patients discharge planning.  Patient is left frustrated but agreeable to staying one more night to manage symptoms

## 2023-03-02 NOTE — PROGRESS NOTES
Subjective: The patient is awake and alert. No problems overnight. Denies chest pain, angina, and dyspnea. Denies abdominal pain. Tolerating diet. No nausea or vomiting. Objective:    /85   Pulse 78   Temp 97.9 °F (36.6 °C) (Oral)   Resp 16   Ht 5' 10\" (1.778 m)   Wt 136 lb (61.7 kg)   SpO2 97%   BMI 19.51 kg/m²     Heart:  RRR, no murmurs, gallops, or rubs.   Lungs:  CTA bilaterally, no wheeze, rales or rhonchi  Abd: bowel sounds present, nontender, nondistended, no masses  Extrem:  No clubbing, cyanosis, or edema    CBC with Differential:    Lab Results   Component Value Date/Time    WBC 14.2 02/28/2023 01:59 PM    RBC 5.08 02/28/2023 01:59 PM    HGB 16.4 02/28/2023 01:59 PM    HCT 48.3 02/28/2023 01:59 PM     02/28/2023 01:59 PM    MCV 95.1 02/28/2023 01:59 PM    MCH 32.3 02/28/2023 01:59 PM    MCHC 34.0 02/28/2023 01:59 PM    RDW 15.9 02/28/2023 01:59 PM    LYMPHOPCT 12.8 02/28/2023 01:59 PM    MONOPCT 3.0 02/28/2023 01:59 PM    BASOPCT 0.9 02/28/2023 01:59 PM    MONOSABS 0.43 02/28/2023 01:59 PM    LYMPHSABS 1.81 02/28/2023 01:59 PM    EOSABS 0.04 02/28/2023 01:59 PM    BASOSABS 0.13 02/28/2023 01:59 PM     CMP:    Lab Results   Component Value Date/Time     02/28/2023 07:18 PM    K 3.9 02/28/2023 07:18 PM    K 4.3 05/13/2022 08:20 AM     02/28/2023 07:18 PM    CO2 19 02/28/2023 07:18 PM    BUN 8 02/28/2023 07:18 PM    CREATININE 0.7 02/28/2023 07:18 PM    GFRAA >60 05/15/2022 07:20 AM    LABGLOM >60 02/28/2023 07:18 PM    GLUCOSE 52 02/28/2023 07:18 PM    PROT 7.3 02/28/2023 01:59 PM    LABALBU 4.5 02/28/2023 01:59 PM    CALCIUM 8.2 02/28/2023 07:18 PM    BILITOT 0.4 02/28/2023 01:59 PM    ALKPHOS 66 02/28/2023 01:59 PM    AST 35 02/28/2023 01:59 PM    ALT 14 02/28/2023 01:59 PM     PT/INR:    Lab Results   Component Value Date/Time    PROTIME 10.2 05/13/2022 08:20 AM    INR 0.9 05/13/2022 08:20 AM     @cktotal:3,ckmb:3,ckmbindex:3,troponini:3@  U/A:    Lab Results Component Value Date/Time    NITRU Negative 11/25/2022 05:17 PM    COLORU DARK YELLOW 11/25/2022 05:17 PM    PHUR 6.0 11/25/2022 05:17 PM    WBCUA 0-1 11/25/2022 05:17 PM    RBCUA 0-1 11/25/2022 05:17 PM    BACTERIA NONE SEEN 11/25/2022 05:17 PM    CLARITYU Clear 11/25/2022 05:17 PM    SPECGRAV 1.020 11/25/2022 05:17 PM    UROBILINOGEN 1.0 11/25/2022 05:17 PM    BILIRUBINUR MODERATE 11/25/2022 05:17 PM    BLOODU Negative 11/25/2022 05:17 PM    GLUCOSEU Negative 11/25/2022 05:17 PM    KETUA >=80 11/25/2022 05:17 PM        Assessment:    Patient Active Problem List   Diagnosis    Severe protein-calorie malnutrition ([address]    Alcohol abuse       Plan:      Alcohol abuse-  Needs resources to help with stopping  Recommended leeting  Social work and peer support in contact    Acute alcohol withdrawal  Will discuss with nursing his CWA scale  Continue folate and thiamins  Await am labs  Needs shower  Stop fluids    Hypoglycemia  Needs to eat  Await c-peptide      Kolton Salgado DO  6:53 AM  3/2/2023

## 2023-03-02 NOTE — PROGRESS NOTES
P Quality Flow/Interdisciplinary Rounds Progress Note        Quality Flow Rounds held on March 2, 2023    Disciplines Attending:  Bedside Nurse, , , and Nursing Unit Leadership    Moar Pastrana was admitted on 2/28/2023  1:19 PM    Anticipated Discharge Date:   tbd    Disposition: home v recovery    Charles Score:  Charles Scale Score: 21    Readmission Risk              Risk of Unplanned Readmission:  12           Discussed patient goal for the day, patient clinical progression, and barriers to discharge.   The following Goal(s) of the Day/Commitment(s) have been identified:   continue CIWA, improved withdraw symptoms      Misaa Layo, RN  March 2, 2023

## 2023-03-02 NOTE — PLAN OF CARE
Problem: Skin/Tissue Integrity  Goal: Absence of new skin breakdown  Description: 1. Monitor for areas of redness and/or skin breakdown  2. Assess vascular access sites hourly  3. Every 4-6 hours minimum:  Change oxygen saturation probe site  4. Every 4-6 hours:  If on nasal continuous positive airway pressure, respiratory therapy assess nares and determine need for appliance change or resting period. Outcome: Progressing     Problem: Safety - Adult  Goal: Free from fall injury  Outcome: Progressing     Problem: Skin/Tissue Integrity  Goal: Absence of new skin breakdown  Description: 1. Monitor for areas of redness and/or skin breakdown  2. Assess vascular access sites hourly  3. Every 4-6 hours minimum:  Change oxygen saturation probe site  4. Every 4-6 hours:  If on nasal continuous positive airway pressure, respiratory therapy assess nares and determine need for appliance change or resting period.   Outcome: Progressing     Problem: Safety - Adult  Goal: Free from fall injury  Outcome: Progressing

## 2023-03-03 VITALS
HEART RATE: 90 BPM | OXYGEN SATURATION: 95 % | SYSTOLIC BLOOD PRESSURE: 128 MMHG | RESPIRATION RATE: 18 BRPM | DIASTOLIC BLOOD PRESSURE: 80 MMHG | WEIGHT: 136 LBS | BODY MASS INDEX: 19.47 KG/M2 | TEMPERATURE: 98.4 F | HEIGHT: 70 IN

## 2023-03-03 PROCEDURE — 2580000003 HC RX 258: Performed by: INTERNAL MEDICINE

## 2023-03-03 PROCEDURE — 6370000000 HC RX 637 (ALT 250 FOR IP): Performed by: INTERNAL MEDICINE

## 2023-03-03 RX ADMIN — FOLIC ACID 1 MG: 1 TABLET ORAL at 08:01

## 2023-03-03 RX ADMIN — SODIUM CHLORIDE, PRESERVATIVE FREE 10 ML: 5 INJECTION INTRAVENOUS at 08:01

## 2023-03-03 RX ADMIN — Medication 100 MG: at 08:01

## 2023-03-03 NOTE — PROGRESS NOTES
Subjective: The patient is awake and alert. No confusion  Less tremor  Objective:    /80   Pulse 81   Temp 98.3 °F (36.8 °C) (Oral)   Resp 18   Ht 5' 10\" (1.778 m)   Wt 136 lb (61.7 kg)   SpO2 95%   BMI 19.51 kg/m²     Heart:  RRR, no murmurs, gallops, or rubs.   Lungs:  CTA bilaterally, no wheeze, rales or rhonchi  Abd: bowel sounds present, nontender, nondistended, no masses  Extrem:  No clubbing, cyanosis, or edema    CBC with Differential:    Lab Results   Component Value Date/Time    WBC 14.2 02/28/2023 01:59 PM    RBC 5.08 02/28/2023 01:59 PM    HGB 16.4 02/28/2023 01:59 PM    HCT 48.3 02/28/2023 01:59 PM     02/28/2023 01:59 PM    MCV 95.1 02/28/2023 01:59 PM    MCH 32.3 02/28/2023 01:59 PM    MCHC 34.0 02/28/2023 01:59 PM    RDW 15.9 02/28/2023 01:59 PM    LYMPHOPCT 12.8 02/28/2023 01:59 PM    MONOPCT 3.0 02/28/2023 01:59 PM    BASOPCT 0.9 02/28/2023 01:59 PM    MONOSABS 0.43 02/28/2023 01:59 PM    LYMPHSABS 1.81 02/28/2023 01:59 PM    EOSABS 0.04 02/28/2023 01:59 PM    BASOSABS 0.13 02/28/2023 01:59 PM     CMP:    Lab Results   Component Value Date/Time     02/28/2023 07:18 PM    K 3.9 02/28/2023 07:18 PM    K 4.3 05/13/2022 08:20 AM     02/28/2023 07:18 PM    CO2 19 02/28/2023 07:18 PM    BUN 8 02/28/2023 07:18 PM    CREATININE 0.7 02/28/2023 07:18 PM    GFRAA >60 05/15/2022 07:20 AM    LABGLOM >60 02/28/2023 07:18 PM    GLUCOSE 52 02/28/2023 07:18 PM    PROT 7.3 02/28/2023 01:59 PM    LABALBU 4.5 02/28/2023 01:59 PM    CALCIUM 8.2 02/28/2023 07:18 PM    BILITOT 0.4 02/28/2023 01:59 PM    ALKPHOS 66 02/28/2023 01:59 PM    AST 35 02/28/2023 01:59 PM    ALT 14 02/28/2023 01:59 PM     PT/INR:    Lab Results   Component Value Date/Time    PROTIME 10.2 05/13/2022 08:20 AM    INR 0.9 05/13/2022 08:20 AM     @cktotal:3,ckmb:3,ckmbindex:3,troponini:3@  U/A:    Lab Results   Component Value Date/Time    NITRU Negative 11/25/2022 05:17 PM    COLORU DARK YELLOW 11/25/2022 05:17 PM PHUR 6.0 11/25/2022 05:17 PM    WBCUA 0-1 11/25/2022 05:17 PM    RBCUA 0-1 11/25/2022 05:17 PM    BACTERIA NONE SEEN 11/25/2022 05:17 PM    CLARITYU Clear 11/25/2022 05:17 PM    SPECGRAV 1.020 11/25/2022 05:17 PM    UROBILINOGEN 1.0 11/25/2022 05:17 PM    BILIRUBINUR MODERATE 11/25/2022 05:17 PM    BLOODU Negative 11/25/2022 05:17 PM    GLUCOSEU Negative 11/25/2022 05:17 PM    KETUA >=80 11/25/2022 05:17 PM        Assessment:    Patient Active Problem List   Diagnosis    Severe protein-calorie malnutrition ([address]    Alcohol abuse       Plan:  Acute alcohol withdrawal with alcohol abuse syndrome-  If CeWa less than 10 today anticipated discharge  Will need outpatient follow up  Inquires about medication for the alcohol abuse treatment  Certainly appropriate but would need coordinated with outpatient treatment and likely addiction specialist        Kolby Phan DO  5:58 AM  3/3/2023

## 2023-03-03 NOTE — CARE COORDINATION
[facility] notified of potential discharge for today. Lyudmila to provide resources to patient. CM will follow along with  and assist with discharge planning as necessary.    Uyen Beasley, MEGN, RN  [facility] Case Management  [phone]

## 2023-03-04 LAB — C-PEPTIDE: 1.6 NG/ML (ref 0.5–3.3)

## 2024-12-13 ENCOUNTER — HOSPITAL ENCOUNTER (INPATIENT)
Age: 39
LOS: 5 days | Discharge: PSYCHIATRIC OR SUBSTANCE USE FACILITY | DRG: 897 | End: 2024-12-18
Attending: EMERGENCY MEDICINE | Admitting: INTERNAL MEDICINE
Payer: MEDICAID

## 2024-12-13 DIAGNOSIS — R45.851 SUICIDAL IDEATION: Primary | ICD-10-CM

## 2024-12-13 DIAGNOSIS — F10.10 ETOH ABUSE: ICD-10-CM

## 2024-12-13 DIAGNOSIS — F10.930 ALCOHOL WITHDRAWAL SYNDROME WITHOUT COMPLICATION (HCC): ICD-10-CM

## 2024-12-13 PROBLEM — F10.139 ALCOHOL ABUSE WITH WITHDRAWAL (HCC): Status: ACTIVE | Noted: 2024-12-13

## 2024-12-13 PROBLEM — F32.A DEPRESSION: Status: ACTIVE | Noted: 2024-12-13

## 2024-12-13 LAB
ALBUMIN SERPL-MCNC: 4.2 G/DL (ref 3.5–5.2)
ALP SERPL-CCNC: 77 U/L (ref 40–129)
ALT SERPL-CCNC: 36 U/L (ref 0–40)
AMPHET UR QL SCN: NEGATIVE
ANION GAP SERPL CALCULATED.3IONS-SCNC: 18 MMOL/L (ref 7–16)
APAP SERPL-MCNC: <5 UG/ML (ref 10–30)
AST SERPL-CCNC: 66 U/L (ref 0–39)
BARBITURATES UR QL SCN: NEGATIVE
BASOPHILS # BLD: 0.07 K/UL (ref 0–0.2)
BASOPHILS NFR BLD: 1 % (ref 0–2)
BENZODIAZ UR QL: POSITIVE
BILIRUB SERPL-MCNC: 0.4 MG/DL (ref 0–1.2)
BUN SERPL-MCNC: 8 MG/DL (ref 6–20)
BUPRENORPHINE UR QL: NEGATIVE
CALCIUM SERPL-MCNC: 8.4 MG/DL (ref 8.6–10.2)
CANNABINOIDS UR QL SCN: POSITIVE
CHLORIDE SERPL-SCNC: 102 MMOL/L (ref 98–107)
CK SERPL-CCNC: 34 U/L (ref 20–200)
CO2 SERPL-SCNC: 21 MMOL/L (ref 22–29)
COCAINE UR QL SCN: NEGATIVE
CREAT SERPL-MCNC: 0.8 MG/DL (ref 0.7–1.2)
EKG ATRIAL RATE: 82 BPM
EKG P AXIS: 49 DEGREES
EKG P-R INTERVAL: 148 MS
EKG Q-T INTERVAL: 388 MS
EKG QRS DURATION: 96 MS
EKG QTC CALCULATION (BAZETT): 453 MS
EKG R AXIS: 87 DEGREES
EKG T AXIS: 43 DEGREES
EKG VENTRICULAR RATE: 82 BPM
EOSINOPHIL # BLD: 0.06 K/UL (ref 0.05–0.5)
EOSINOPHILS RELATIVE PERCENT: 1 % (ref 0–6)
ERYTHROCYTE [DISTWIDTH] IN BLOOD BY AUTOMATED COUNT: 14.6 % (ref 11.5–15)
ETHANOLAMINE SERPL-MCNC: 130 MG/DL (ref 0–0.08)
FENTANYL UR QL: NEGATIVE
GFR, ESTIMATED: >90 ML/MIN/1.73M2
GLUCOSE SERPL-MCNC: 78 MG/DL (ref 74–99)
HCT VFR BLD AUTO: 45.5 % (ref 37–54)
HGB BLD-MCNC: 15.4 G/DL (ref 12.5–16.5)
IMM GRANULOCYTES # BLD AUTO: <0.03 K/UL (ref 0–0.58)
IMM GRANULOCYTES NFR BLD: 0 % (ref 0–5)
LYMPHOCYTES NFR BLD: 2.11 K/UL (ref 1.5–4)
LYMPHOCYTES RELATIVE PERCENT: 27 % (ref 20–42)
MCH RBC QN AUTO: 34.6 PG (ref 26–35)
MCHC RBC AUTO-ENTMCNC: 33.8 G/DL (ref 32–34.5)
MCV RBC AUTO: 102.2 FL (ref 80–99.9)
METHADONE UR QL: NEGATIVE
MONOCYTES NFR BLD: 0.5 K/UL (ref 0.1–0.95)
MONOCYTES NFR BLD: 6 % (ref 2–12)
NEUTROPHILS NFR BLD: 65 % (ref 43–80)
NEUTS SEG NFR BLD: 5.03 K/UL (ref 1.8–7.3)
OPIATES UR QL SCN: NEGATIVE
OXYCODONE UR QL SCN: NEGATIVE
PCP UR QL SCN: NEGATIVE
PLATELET # BLD AUTO: 227 K/UL (ref 130–450)
PMV BLD AUTO: 10 FL (ref 7–12)
POTASSIUM SERPL-SCNC: 3.6 MMOL/L (ref 3.5–5)
PROT SERPL-MCNC: 6.8 G/DL (ref 6.4–8.3)
RBC # BLD AUTO: 4.45 M/UL (ref 3.8–5.8)
SALICYLATES SERPL-MCNC: <0.3 MG/DL (ref 0–30)
SODIUM SERPL-SCNC: 141 MMOL/L (ref 132–146)
TEST INFORMATION: ABNORMAL
TOXIC TRICYCLIC SC,BLOOD: NEGATIVE
WBC OTHER # BLD: 7.8 K/UL (ref 4.5–11.5)

## 2024-12-13 PROCEDURE — 2580000003 HC RX 258: Performed by: PHYSICIAN ASSISTANT

## 2024-12-13 PROCEDURE — 6360000002 HC RX W HCPCS: Performed by: EMERGENCY MEDICINE

## 2024-12-13 PROCEDURE — 96376 TX/PRO/DX INJ SAME DRUG ADON: CPT

## 2024-12-13 PROCEDURE — 80179 DRUG ASSAY SALICYLATE: CPT

## 2024-12-13 PROCEDURE — 96374 THER/PROPH/DIAG INJ IV PUSH: CPT

## 2024-12-13 PROCEDURE — 99285 EMERGENCY DEPT VISIT HI MDM: CPT

## 2024-12-13 PROCEDURE — 82550 ASSAY OF CK (CPK): CPT

## 2024-12-13 PROCEDURE — 85025 COMPLETE CBC W/AUTO DIFF WBC: CPT

## 2024-12-13 PROCEDURE — 93010 ELECTROCARDIOGRAM REPORT: CPT | Performed by: INTERNAL MEDICINE

## 2024-12-13 PROCEDURE — 93005 ELECTROCARDIOGRAM TRACING: CPT | Performed by: EMERGENCY MEDICINE

## 2024-12-13 PROCEDURE — 80053 COMPREHEN METABOLIC PANEL: CPT

## 2024-12-13 PROCEDURE — 6370000000 HC RX 637 (ALT 250 FOR IP): Performed by: PHYSICIAN ASSISTANT

## 2024-12-13 PROCEDURE — G0480 DRUG TEST DEF 1-7 CLASSES: HCPCS

## 2024-12-13 PROCEDURE — 90792 PSYCH DIAG EVAL W/MED SRVCS: CPT | Performed by: REGISTERED NURSE

## 2024-12-13 PROCEDURE — 2060000000 HC ICU INTERMEDIATE R&B

## 2024-12-13 PROCEDURE — 6370000000 HC RX 637 (ALT 250 FOR IP): Performed by: EMERGENCY MEDICINE

## 2024-12-13 PROCEDURE — 80307 DRUG TEST PRSMV CHEM ANLYZR: CPT

## 2024-12-13 PROCEDURE — 80143 DRUG ASSAY ACETAMINOPHEN: CPT

## 2024-12-13 RX ORDER — ONDANSETRON 4 MG/1
4 TABLET, ORALLY DISINTEGRATING ORAL EVERY 8 HOURS PRN
Status: DISCONTINUED | OUTPATIENT
Start: 2024-12-13 | End: 2024-12-18 | Stop reason: HOSPADM

## 2024-12-13 RX ORDER — LORAZEPAM 1 MG/1
2 TABLET ORAL
Status: DISCONTINUED | OUTPATIENT
Start: 2024-12-13 | End: 2024-12-18 | Stop reason: HOSPADM

## 2024-12-13 RX ORDER — SODIUM CHLORIDE 0.9 % (FLUSH) 0.9 %
5-40 SYRINGE (ML) INJECTION PRN
Status: DISCONTINUED | OUTPATIENT
Start: 2024-12-13 | End: 2024-12-18 | Stop reason: HOSPADM

## 2024-12-13 RX ORDER — PAROXETINE 10 MG/1
12.5 TABLET, FILM COATED ORAL EVERY MORNING
Status: DISCONTINUED | OUTPATIENT
Start: 2024-12-14 | End: 2024-12-13 | Stop reason: CLARIF

## 2024-12-13 RX ORDER — LORAZEPAM 2 MG/ML
3 INJECTION INTRAMUSCULAR
Status: DISCONTINUED | OUTPATIENT
Start: 2024-12-13 | End: 2024-12-18 | Stop reason: HOSPADM

## 2024-12-13 RX ORDER — LORAZEPAM 1 MG/1
3 TABLET ORAL
Status: DISCONTINUED | OUTPATIENT
Start: 2024-12-13 | End: 2024-12-18 | Stop reason: HOSPADM

## 2024-12-13 RX ORDER — SODIUM CHLORIDE 0.9 % (FLUSH) 0.9 %
10 SYRINGE (ML) INJECTION PRN
Status: DISCONTINUED | OUTPATIENT
Start: 2024-12-13 | End: 2024-12-13 | Stop reason: SDUPTHER

## 2024-12-13 RX ORDER — ONDANSETRON 2 MG/ML
4 INJECTION INTRAMUSCULAR; INTRAVENOUS EVERY 6 HOURS PRN
Status: DISCONTINUED | OUTPATIENT
Start: 2024-12-13 | End: 2024-12-18 | Stop reason: HOSPADM

## 2024-12-13 RX ORDER — SODIUM CHLORIDE 9 MG/ML
INJECTION, SOLUTION INTRAVENOUS PRN
Status: DISCONTINUED | OUTPATIENT
Start: 2024-12-13 | End: 2024-12-13 | Stop reason: SDUPTHER

## 2024-12-13 RX ORDER — SODIUM CHLORIDE 9 MG/ML
INJECTION, SOLUTION INTRAVENOUS PRN
Status: DISCONTINUED | OUTPATIENT
Start: 2024-12-13 | End: 2024-12-18 | Stop reason: HOSPADM

## 2024-12-13 RX ORDER — ACETAMINOPHEN 650 MG/1
650 SUPPOSITORY RECTAL EVERY 6 HOURS PRN
Status: DISCONTINUED | OUTPATIENT
Start: 2024-12-13 | End: 2024-12-18 | Stop reason: HOSPADM

## 2024-12-13 RX ORDER — PAROXETINE 10 MG/1
10 TABLET, FILM COATED ORAL EVERY MORNING
Status: DISCONTINUED | OUTPATIENT
Start: 2024-12-14 | End: 2024-12-17

## 2024-12-13 RX ORDER — LORAZEPAM 2 MG/ML
4 INJECTION INTRAMUSCULAR
Status: DISCONTINUED | OUTPATIENT
Start: 2024-12-13 | End: 2024-12-18 | Stop reason: HOSPADM

## 2024-12-13 RX ORDER — POTASSIUM CHLORIDE 1500 MG/1
40 TABLET, EXTENDED RELEASE ORAL PRN
Status: DISCONTINUED | OUTPATIENT
Start: 2024-12-13 | End: 2024-12-18 | Stop reason: HOSPADM

## 2024-12-13 RX ORDER — FOLIC ACID 1 MG/1
1 TABLET ORAL DAILY
Status: DISCONTINUED | OUTPATIENT
Start: 2024-12-14 | End: 2024-12-18 | Stop reason: HOSPADM

## 2024-12-13 RX ORDER — LANOLIN ALCOHOL/MO/W.PET/CERES
100 CREAM (GRAM) TOPICAL DAILY
Status: DISCONTINUED | OUTPATIENT
Start: 2024-12-13 | End: 2024-12-18 | Stop reason: HOSPADM

## 2024-12-13 RX ORDER — SODIUM CHLORIDE 0.9 % (FLUSH) 0.9 %
5-40 SYRINGE (ML) INJECTION EVERY 12 HOURS SCHEDULED
Status: DISCONTINUED | OUTPATIENT
Start: 2024-12-13 | End: 2024-12-18 | Stop reason: HOSPADM

## 2024-12-13 RX ORDER — SENNOSIDES A AND B 8.6 MG/1
1 TABLET, FILM COATED ORAL DAILY PRN
Status: DISCONTINUED | OUTPATIENT
Start: 2024-12-13 | End: 2024-12-18 | Stop reason: HOSPADM

## 2024-12-13 RX ORDER — POTASSIUM CHLORIDE 7.45 MG/ML
10 INJECTION INTRAVENOUS PRN
Status: DISCONTINUED | OUTPATIENT
Start: 2024-12-13 | End: 2024-12-13 | Stop reason: RX

## 2024-12-13 RX ORDER — LORAZEPAM 1 MG/1
4 TABLET ORAL
Status: DISCONTINUED | OUTPATIENT
Start: 2024-12-13 | End: 2024-12-18 | Stop reason: HOSPADM

## 2024-12-13 RX ORDER — ACETAMINOPHEN 325 MG/1
650 TABLET ORAL EVERY 6 HOURS PRN
Status: DISCONTINUED | OUTPATIENT
Start: 2024-12-13 | End: 2024-12-18 | Stop reason: HOSPADM

## 2024-12-13 RX ORDER — BACLOFEN 10 MG/1
10 TABLET ORAL 3 TIMES DAILY
Status: ON HOLD | COMMUNITY

## 2024-12-13 RX ORDER — LORAZEPAM 1 MG/1
1 TABLET ORAL
Status: DISCONTINUED | OUTPATIENT
Start: 2024-12-13 | End: 2024-12-18 | Stop reason: HOSPADM

## 2024-12-13 RX ORDER — MAGNESIUM SULFATE IN WATER 40 MG/ML
2000 INJECTION, SOLUTION INTRAVENOUS PRN
Status: DISCONTINUED | OUTPATIENT
Start: 2024-12-13 | End: 2024-12-18 | Stop reason: HOSPADM

## 2024-12-13 RX ORDER — SODIUM CHLORIDE 0.9 % (FLUSH) 0.9 %
10 SYRINGE (ML) INJECTION EVERY 12 HOURS SCHEDULED
Status: DISCONTINUED | OUTPATIENT
Start: 2024-12-13 | End: 2024-12-13 | Stop reason: SDUPTHER

## 2024-12-13 RX ORDER — ENOXAPARIN SODIUM 100 MG/ML
40 INJECTION SUBCUTANEOUS DAILY
Status: DISCONTINUED | OUTPATIENT
Start: 2024-12-14 | End: 2024-12-18 | Stop reason: HOSPADM

## 2024-12-13 RX ORDER — LORAZEPAM 2 MG/ML
2 INJECTION INTRAMUSCULAR
Status: DISCONTINUED | OUTPATIENT
Start: 2024-12-13 | End: 2024-12-18 | Stop reason: HOSPADM

## 2024-12-13 RX ORDER — BACLOFEN 10 MG/1
10 TABLET ORAL 3 TIMES DAILY
Status: DISCONTINUED | OUTPATIENT
Start: 2024-12-13 | End: 2024-12-18 | Stop reason: HOSPADM

## 2024-12-13 RX ORDER — LORAZEPAM 2 MG/ML
1 INJECTION INTRAMUSCULAR
Status: DISCONTINUED | OUTPATIENT
Start: 2024-12-13 | End: 2024-12-18 | Stop reason: HOSPADM

## 2024-12-13 RX ADMIN — LORAZEPAM 2 MG: 2 INJECTION INTRAMUSCULAR; INTRAVENOUS at 13:51

## 2024-12-13 RX ADMIN — Medication 100 MG: at 09:09

## 2024-12-13 RX ADMIN — SODIUM CHLORIDE, PRESERVATIVE FREE 10 ML: 5 INJECTION INTRAVENOUS at 22:21

## 2024-12-13 RX ADMIN — BACLOFEN 10 MG: 10 TABLET ORAL at 22:21

## 2024-12-13 RX ADMIN — LORAZEPAM 2 MG: 2 INJECTION INTRAMUSCULAR; INTRAVENOUS at 19:40

## 2024-12-13 ASSESSMENT — LIFESTYLE VARIABLES
HOW MANY STANDARD DRINKS CONTAINING ALCOHOL DO YOU HAVE ON A TYPICAL DAY: 10 OR MORE
HOW OFTEN DO YOU HAVE A DRINK CONTAINING ALCOHOL: 4 OR MORE TIMES A WEEK

## 2024-12-13 NOTE — VIRTUAL HEALTH
Michelet Anthony  [id number]  1985     EMERGENCY DEPARTMENT TELEPSYCHIATRY EVALUATION    12/13/24    Chief Complaint:  “SI with ETOH withdrawal”    HPI: Patient is a 39 y.o.  male who presents for SI with ETOH withdrawal. Patient presented to the ED on 12/13/24 from home    Per cahrt review, \"i.thoughts for last couple days.  plus alcohol withdrawal last drink 48 hours ago. Was drinking about a pint a day.     Endorses suicidal thoughts, taking over dose of medication with drinking alcohol.    Increased psychosocial stressors, conflict with girls friend, financial burdens, ETOH making employment difficult.  Feels like a vicious Samish.    Daily drinker since COVID, consuming 1 pint per day whiskey.    Reports hx of DT, denies seizures but endorses disorientation; chart review shows medical admission for DTs 02/2023    Previously engaged in rehab for 2-3 months, states sober for appx 4 months     Duration:  Severity: Rating mood to be around 3/10 (10- good)  Quality:melancholic  Worse through the day  Content: Hopeless, worthless and helpless feeling  Suicidal thoughts active with paln to OD last month  Associated symptoms:  Poor concentration, anhedonia, decrease motivation  Sleep and appetite- poor  Modifying factor: ETOH     Past Psychiatric History:  Previous Diagnoses/symptoms: Denies  Previous suicide attempts/self-harm: Denies  Inpatient psychiatric hospitalizations: no  Current outpatient psychiatric provider: Denies  Current therapist: States not in therapy  Previous psychiatric medication trials: celexa  Current psychiatric medications: Paxil 10mg QAM  History of ECT: no  Family Psychiatric History: Denies    Substance Abuse History:  Tobacco: Endorses vape and cigerettes  Alcohol: Endorses 1 pint of whisky per day  Marijuana: Endorses daily  Stimulant: Denies  Opiates: Denies  Benzodiazepine: Denies  Other illicit drug usage: Denies  History of substance/alcohol abuse treatment: Yes    Social

## 2024-12-13 NOTE — ED PROVIDER NOTES
toxic-appearing.   HENT:      Head: Normocephalic and atraumatic.      Right Ear: External ear normal.      Left Ear: External ear normal.      Nose: Nose normal. No congestion.      Mouth/Throat:      Mouth: Mucous membranes are moist.      Pharynx: Oropharynx is clear. No posterior oropharyngeal erythema.   Eyes:      Extraocular Movements: Extraocular movements intact.      Pupils: Pupils are equal, round, and reactive to light.   Cardiovascular:      Rate and Rhythm: Normal rate and regular rhythm.      Pulses: Normal pulses.      Heart sounds: No murmur heard.  Pulmonary:      Effort: Pulmonary effort is normal.      Breath sounds: No wheezing or rhonchi.   Chest:      Chest wall: No tenderness.   Abdominal:      General: Bowel sounds are normal.      Tenderness: There is no abdominal tenderness. There is no right CVA tenderness, left CVA tenderness or guarding.   Musculoskeletal:         General: No swelling or deformity.      Cervical back: Normal range of motion and neck supple. No muscular tenderness.   Skin:     General: Skin is warm and dry.      Capillary Refill: Capillary refill takes less than 2 seconds.   Neurological:      General: No focal deficit present.      Mental Status: He is alert and oriented to person, place, and time.   Psychiatric:         Mood and Affect: Mood normal.             DIAGNOSTIC RESULTS   LABS:    Labs Reviewed   CBC WITH AUTO DIFFERENTIAL - Abnormal; Notable for the following components:       Result Value    .2 (*)     All other components within normal limits   COMPREHENSIVE METABOLIC PANEL - Abnormal; Notable for the following components:    CO2 21 (*)     Anion Gap 18 (*)     Calcium 8.4 (*)     AST 66 (*)     All other components within normal limits   SERUM DRUG SCREEN - Abnormal; Notable for the following components:    Acetaminophen Level <5 (*)     Ethanol Lvl 130 (*)     All other components within normal limits   URINE DRUG SCREEN - Abnormal; Notable for

## 2024-12-14 LAB
ALBUMIN SERPL-MCNC: 4 G/DL (ref 3.5–5.2)
ALP SERPL-CCNC: 77 U/L (ref 40–129)
ALT SERPL-CCNC: 32 U/L (ref 0–40)
ANION GAP SERPL CALCULATED.3IONS-SCNC: 14 MMOL/L (ref 7–16)
AST SERPL-CCNC: 54 U/L (ref 0–39)
BASOPHILS # BLD: 0.05 K/UL (ref 0–0.2)
BASOPHILS NFR BLD: 1 % (ref 0–2)
BILIRUB SERPL-MCNC: 1.2 MG/DL (ref 0–1.2)
BUN SERPL-MCNC: 9 MG/DL (ref 6–20)
CALCIUM SERPL-MCNC: 9 MG/DL (ref 8.6–10.2)
CHLORIDE SERPL-SCNC: 101 MMOL/L (ref 98–107)
CO2 SERPL-SCNC: 23 MMOL/L (ref 22–29)
CREAT SERPL-MCNC: 0.8 MG/DL (ref 0.7–1.2)
EOSINOPHIL # BLD: 0.15 K/UL (ref 0.05–0.5)
EOSINOPHILS RELATIVE PERCENT: 3 % (ref 0–6)
ERYTHROCYTE [DISTWIDTH] IN BLOOD BY AUTOMATED COUNT: 14.1 % (ref 11.5–15)
GFR, ESTIMATED: >90 ML/MIN/1.73M2
GLUCOSE SERPL-MCNC: 68 MG/DL (ref 74–99)
HCT VFR BLD AUTO: 44.4 % (ref 37–54)
HGB BLD-MCNC: 15.2 G/DL (ref 12.5–16.5)
IMM GRANULOCYTES # BLD AUTO: <0.03 K/UL (ref 0–0.58)
IMM GRANULOCYTES NFR BLD: 0 % (ref 0–5)
LYMPHOCYTES NFR BLD: 1.95 K/UL (ref 1.5–4)
LYMPHOCYTES RELATIVE PERCENT: 35 % (ref 20–42)
MCH RBC QN AUTO: 35.1 PG (ref 26–35)
MCHC RBC AUTO-ENTMCNC: 34.2 G/DL (ref 32–34.5)
MCV RBC AUTO: 102.5 FL (ref 80–99.9)
MONOCYTES NFR BLD: 0.51 K/UL (ref 0.1–0.95)
MONOCYTES NFR BLD: 9 % (ref 2–12)
NEUTROPHILS NFR BLD: 52 % (ref 43–80)
NEUTS SEG NFR BLD: 2.89 K/UL (ref 1.8–7.3)
PLATELET # BLD AUTO: 196 K/UL (ref 130–450)
PMV BLD AUTO: 10.8 FL (ref 7–12)
POTASSIUM SERPL-SCNC: 3.9 MMOL/L (ref 3.5–5)
PROT SERPL-MCNC: 6.5 G/DL (ref 6.4–8.3)
RBC # BLD AUTO: 4.33 M/UL (ref 3.8–5.8)
SODIUM SERPL-SCNC: 138 MMOL/L (ref 132–146)
WBC OTHER # BLD: 5.6 K/UL (ref 4.5–11.5)

## 2024-12-14 PROCEDURE — 6370000000 HC RX 637 (ALT 250 FOR IP): Performed by: PHYSICIAN ASSISTANT

## 2024-12-14 PROCEDURE — 6360000002 HC RX W HCPCS: Performed by: PHYSICIAN ASSISTANT

## 2024-12-14 PROCEDURE — 2060000000 HC ICU INTERMEDIATE R&B

## 2024-12-14 PROCEDURE — 6370000000 HC RX 637 (ALT 250 FOR IP): Performed by: INTERNAL MEDICINE

## 2024-12-14 PROCEDURE — 2580000003 HC RX 258: Performed by: PHYSICIAN ASSISTANT

## 2024-12-14 PROCEDURE — 80053 COMPREHEN METABOLIC PANEL: CPT

## 2024-12-14 PROCEDURE — 85025 COMPLETE CBC W/AUTO DIFF WBC: CPT

## 2024-12-14 RX ORDER — OXYCODONE AND ACETAMINOPHEN 5; 325 MG/1; MG/1
1 TABLET ORAL EVERY 4 HOURS PRN
Status: DISCONTINUED | OUTPATIENT
Start: 2024-12-14 | End: 2024-12-18 | Stop reason: HOSPADM

## 2024-12-14 RX ORDER — NICOTINE 21 MG/24HR
1 PATCH, TRANSDERMAL 24 HOURS TRANSDERMAL DAILY
Status: DISCONTINUED | OUTPATIENT
Start: 2024-12-14 | End: 2024-12-18 | Stop reason: HOSPADM

## 2024-12-14 RX ADMIN — LORAZEPAM 1 MG: 1 TABLET ORAL at 13:18

## 2024-12-14 RX ADMIN — LORAZEPAM 1 MG: 2 INJECTION INTRAMUSCULAR; INTRAVENOUS at 05:07

## 2024-12-14 RX ADMIN — SODIUM CHLORIDE, PRESERVATIVE FREE 10 ML: 5 INJECTION INTRAVENOUS at 08:41

## 2024-12-14 RX ADMIN — FOLIC ACID 1 MG: 1 TABLET ORAL at 08:41

## 2024-12-14 RX ADMIN — Medication 100 MG: at 08:41

## 2024-12-14 RX ADMIN — PAROXETINE 10 MG: 10 TABLET, FILM COATED ORAL at 08:40

## 2024-12-14 RX ADMIN — ENOXAPARIN SODIUM 40 MG: 100 INJECTION SUBCUTANEOUS at 08:41

## 2024-12-14 RX ADMIN — BACLOFEN 10 MG: 10 TABLET ORAL at 08:41

## 2024-12-14 RX ADMIN — BACLOFEN 10 MG: 10 TABLET ORAL at 14:13

## 2024-12-14 RX ADMIN — SODIUM CHLORIDE, PRESERVATIVE FREE 10 ML: 5 INJECTION INTRAVENOUS at 20:02

## 2024-12-14 RX ADMIN — BACLOFEN 10 MG: 10 TABLET ORAL at 20:02

## 2024-12-14 NOTE — PLAN OF CARE
Problem: Safety - Adult  Goal: Free from fall injury  12/14/2024 0744 by Irina Giraldo RN  Outcome: Progressing  12/14/2024 0028 by Lidia Corcoran RN  Outcome: Progressing  12/14/2024 0028 by Lidia Corcoran RN  Outcome: Progressing     Problem: Self Harm/Suicidality  Goal: Will have no self-injury during hospital stay  Description: INTERVENTIONS:  1.  Ensure constant observer at bedside with Q15M safety checks  2.  Maintain a safe environment  3.  Secure patient belongings  4.  Ensure family/visitors adhere to safety recommendations  5.  Ensure safety tray has been added to patient's diet order  6.  Every shift and PRN: Re-assess suicidal risk via Frequent Screener    12/14/2024 0744 by Irina Giraldo RN  Outcome: Progressing  12/14/2024 0028 by Lidia Corcoran RN  Outcome: Progressing

## 2024-12-14 NOTE — CARE COORDINATION
Internal Medicine On-call Care Coordination Note    I was called by the ED physician because they recommended admission for this patient and I cover their PCP.  The history as I understand it after discussion with the ED physician is as follows:    Presents with depression, SI  Pink slipped in ED  Evaluated by psych - will need medical clearance prior to psych placement  Chronic EtOH use and going through withdrawal - requiring Ativan in ED  Decision made for admission for medical clearance prior to psychiatric placement    I placed admission orders.  Including:    [facility]  Tele  Social work consult    Dr. Hardin or his coverage will see the patient tomorrow for H&P.    Electronically signed by Ritchie Chaves PA-C on 12/13/2024 at 8:11 PM

## 2024-12-14 NOTE — H&P
normal S1, S2  Abdomen: soft, non-tender; bowel sounds normal; no masses, no organomegaly  Extremities: no lower extremity edema, extremities atraumatic, no cyanosis, no clubbing, 2+ pedal pulses palpated  Skin: normal color, normal texture, normal turgor, no rashes, no lesions  Neurologic:5/5 muscle strength throughout, normal muscle tone throughout, PERRLA, EOMI, face symmetric, hearing intact, tongue midline, speech appropriate without slurring.    Labs-   Lab Results   Component Value Date    WBC 5.6 12/14/2024    HGB 15.2 12/14/2024    HCT 44.4 12/14/2024     12/14/2024     12/14/2024    K 3.9 12/14/2024     12/14/2024    CREATININE 0.8 12/14/2024    BUN 9 12/14/2024    CO2 23 12/14/2024    GLUCOSE 68 (L) 12/14/2024    ALT 32 12/14/2024    AST 54 (H) 12/14/2024    INR 0.9 05/13/2022     Lab Results   Component Value Date    CKTOTAL 34 12/13/2024    TROPONINI <0.01 06/06/2020     No results for input(s): \"BNP\" in the last 72 hours.      Recent Radiological Studies:  No results found.    Assessment/Plan  Patient is a 39 y.o. male who presents with Suicidal (si.thoughts for last couple days.  plus alcohol withdrawal last drink 48 hours ago. Was drinking about a pint a day. )        Full problem list includes:  Patient Active Problem List    Diagnosis Date Noted    Alcohol abuse 02/28/2023    Severe protein-calorie malnutrition (HCC) 05/14/2022    Alcohol abuse with withdrawal (HCC) 12/13/2024    Depression 12/13/2024    Suicidal ideation 12/13/2024     Alcohol Dependence with current alcohol withdrawal  CIWA IV ativan prn agitation/signs of withdrawal  So far scale of 7   Ativan x 1 only so far  Thiamine and folic acid daily  Check magnesium, vitamin B12, folate levels  Check urine drug screen   Positive for marijuana, benzodiazepine (although uncertain timing if received benzo prior to UDS obtained)    Mood Disorder with active suicidal ideology  Appreciate psychology input  Currently

## 2024-12-14 NOTE — ED NOTES
..ED to Inpatient Handoff Report    Notified Jimmie that electronic handoff available and patient ready for transport to room 630.    Safety Risks: None identified    Patient in Restraints: no    Constant Observer or Patient : no    Telemetry Monitoring Ordered :Yes      Cardiac Rhythm: Sinus tachy    Order to transfer to unit without monitor:YES    Last MEWS: 1 Time completed: 2056    Deterioration Index Score:   Predictive Model Details          17 (Normal)  Factor Value    Calculated 12/13/2024 20:58 44% Age 39 years old    Deterioration Index Model 33% Cardiac rhythm Sinus tachy     10% Systolic 135     5% Sodium 141 mmol/L     4% Potassium 3.6 mmol/L     1% Temperature 99 °F (37.2 °C)     1% Pulse oximetry 97 %     1% Pulse 80     1% WBC count 7.8 k/uL     1% Hematocrit 45.5 %     0% Respiratory rate 16        Vitals:    12/13/24 1344 12/13/24 1935 12/13/24 1936 12/13/24 2056   BP: (!) 166/102 (!) 152/89 (!) 152/89 135/80   Pulse: 71 82 (!) 114 80   Resp:  16  16   Temp:    99 °F (37.2 °C)   TempSrc:    Oral   SpO2:  97%  97%   Weight:       Height:             Opportunity for questions and clarification was provided.

## 2024-12-14 NOTE — PLAN OF CARE
Problem: Safety - Adult  Goal: Free from fall injury  12/14/2024 0028 by Lidia Corcoran, RN  Outcome: Progressing  12/14/2024 0028 by Lidia Corcoran, RN  Outcome: Progressing     Problem: Self Harm/Suicidality  Goal: Will have no self-injury during hospital stay  Description: INTERVENTIONS:  1.  Ensure constant observer at bedside with Q15M safety checks  2.  Maintain a safe environment  3.  Secure patient belongings  4.  Ensure family/visitors adhere to safety recommendations  5.  Ensure safety tray has been added to patient's diet order  6.  Every shift and PRN: Re-assess suicidal risk via Frequent Screener    Outcome: Progressing

## 2024-12-15 LAB
ALBUMIN SERPL-MCNC: 4.1 G/DL (ref 3.5–5.2)
ALP SERPL-CCNC: 74 U/L (ref 40–129)
ALT SERPL-CCNC: 28 U/L (ref 0–40)
ANION GAP SERPL CALCULATED.3IONS-SCNC: 10 MMOL/L (ref 7–16)
AST SERPL-CCNC: 46 U/L (ref 0–39)
BASOPHILS # BLD: 0.07 K/UL (ref 0–0.2)
BASOPHILS NFR BLD: 1 % (ref 0–2)
BILIRUB SERPL-MCNC: 1.1 MG/DL (ref 0–1.2)
BUN SERPL-MCNC: 7 MG/DL (ref 6–20)
CALCIUM SERPL-MCNC: 9.4 MG/DL (ref 8.6–10.2)
CHLORIDE SERPL-SCNC: 100 MMOL/L (ref 98–107)
CO2 SERPL-SCNC: 26 MMOL/L (ref 22–29)
CREAT SERPL-MCNC: 0.9 MG/DL (ref 0.7–1.2)
EOSINOPHIL # BLD: 0.17 K/UL (ref 0.05–0.5)
EOSINOPHILS RELATIVE PERCENT: 3 % (ref 0–6)
ERYTHROCYTE [DISTWIDTH] IN BLOOD BY AUTOMATED COUNT: 13.4 % (ref 11.5–15)
ETHANOLAMINE SERPL-MCNC: <10 MG/DL (ref 0–0.08)
FOLATE SERPL-MCNC: 9.3 NG/ML (ref 4.8–24.2)
GFR, ESTIMATED: >90 ML/MIN/1.73M2
GLUCOSE SERPL-MCNC: 93 MG/DL (ref 74–99)
HCT VFR BLD AUTO: 45.6 % (ref 37–54)
HGB BLD-MCNC: 15.4 G/DL (ref 12.5–16.5)
IMM GRANULOCYTES # BLD AUTO: <0.03 K/UL (ref 0–0.58)
IMM GRANULOCYTES NFR BLD: 0 % (ref 0–5)
LYMPHOCYTES NFR BLD: 2.17 K/UL (ref 1.5–4)
LYMPHOCYTES RELATIVE PERCENT: 39 % (ref 20–42)
MAGNESIUM SERPL-MCNC: 2.1 MG/DL (ref 1.6–2.6)
MCH RBC QN AUTO: 34.5 PG (ref 26–35)
MCHC RBC AUTO-ENTMCNC: 33.8 G/DL (ref 32–34.5)
MCV RBC AUTO: 102.2 FL (ref 80–99.9)
MONOCYTES NFR BLD: 0.62 K/UL (ref 0.1–0.95)
MONOCYTES NFR BLD: 11 % (ref 2–12)
NEUTROPHILS NFR BLD: 45 % (ref 43–80)
NEUTS SEG NFR BLD: 2.47 K/UL (ref 1.8–7.3)
PLATELET # BLD AUTO: 200 K/UL (ref 130–450)
PMV BLD AUTO: 10.6 FL (ref 7–12)
POTASSIUM SERPL-SCNC: 4.1 MMOL/L (ref 3.5–5)
PROT SERPL-MCNC: 6.5 G/DL (ref 6.4–8.3)
RBC # BLD AUTO: 4.46 M/UL (ref 3.8–5.8)
SODIUM SERPL-SCNC: 136 MMOL/L (ref 132–146)
VIT B12 SERPL-MCNC: 330 PG/ML (ref 211–946)
WBC OTHER # BLD: 5.5 K/UL (ref 4.5–11.5)

## 2024-12-15 PROCEDURE — 2060000000 HC ICU INTERMEDIATE R&B

## 2024-12-15 PROCEDURE — 83735 ASSAY OF MAGNESIUM: CPT

## 2024-12-15 PROCEDURE — 2580000003 HC RX 258: Performed by: PHYSICIAN ASSISTANT

## 2024-12-15 PROCEDURE — 85025 COMPLETE CBC W/AUTO DIFF WBC: CPT

## 2024-12-15 PROCEDURE — 6370000000 HC RX 637 (ALT 250 FOR IP): Performed by: PHYSICIAN ASSISTANT

## 2024-12-15 PROCEDURE — 82607 VITAMIN B-12: CPT

## 2024-12-15 PROCEDURE — 82746 ASSAY OF FOLIC ACID SERUM: CPT

## 2024-12-15 PROCEDURE — 6360000002 HC RX W HCPCS: Performed by: PHYSICIAN ASSISTANT

## 2024-12-15 PROCEDURE — G0480 DRUG TEST DEF 1-7 CLASSES: HCPCS

## 2024-12-15 PROCEDURE — 80053 COMPREHEN METABOLIC PANEL: CPT

## 2024-12-15 PROCEDURE — 6370000000 HC RX 637 (ALT 250 FOR IP): Performed by: INTERNAL MEDICINE

## 2024-12-15 RX ORDER — NICOTINE 21 MG/24HR
1 PATCH, TRANSDERMAL 24 HOURS TRANSDERMAL DAILY
Status: CANCELLED | OUTPATIENT
Start: 2024-12-16

## 2024-12-15 RX ORDER — ACETAMINOPHEN 650 MG/1
650 SUPPOSITORY RECTAL EVERY 6 HOURS PRN
Status: CANCELLED | OUTPATIENT
Start: 2024-12-15

## 2024-12-15 RX ORDER — SENNOSIDES A AND B 8.6 MG/1
1 TABLET, FILM COATED ORAL DAILY PRN
Status: CANCELLED | OUTPATIENT
Start: 2024-12-15

## 2024-12-15 RX ORDER — POTASSIUM CHLORIDE 1500 MG/1
40 TABLET, EXTENDED RELEASE ORAL PRN
Status: CANCELLED | OUTPATIENT
Start: 2024-12-15

## 2024-12-15 RX ORDER — LORAZEPAM 1 MG/1
4 TABLET ORAL
OUTPATIENT
Start: 2024-12-15

## 2024-12-15 RX ORDER — OXYCODONE AND ACETAMINOPHEN 5; 325 MG/1; MG/1
1 TABLET ORAL EVERY 4 HOURS PRN
Status: CANCELLED | OUTPATIENT
Start: 2024-12-15

## 2024-12-15 RX ORDER — LORAZEPAM 1 MG/1
2 TABLET ORAL
OUTPATIENT
Start: 2024-12-15

## 2024-12-15 RX ORDER — LORAZEPAM 2 MG/ML
1 INJECTION INTRAMUSCULAR
OUTPATIENT
Start: 2024-12-15

## 2024-12-15 RX ORDER — LORAZEPAM 2 MG/ML
4 INJECTION INTRAMUSCULAR
OUTPATIENT
Start: 2024-12-15

## 2024-12-15 RX ORDER — LORAZEPAM 2 MG/ML
3 INJECTION INTRAMUSCULAR
OUTPATIENT
Start: 2024-12-15

## 2024-12-15 RX ORDER — FOLIC ACID 1 MG/1
1 TABLET ORAL DAILY
Status: CANCELLED | OUTPATIENT
Start: 2024-12-16

## 2024-12-15 RX ORDER — SODIUM CHLORIDE 0.9 % (FLUSH) 0.9 %
5-40 SYRINGE (ML) INJECTION PRN
Status: CANCELLED | OUTPATIENT
Start: 2024-12-15

## 2024-12-15 RX ORDER — ONDANSETRON 4 MG/1
4 TABLET, ORALLY DISINTEGRATING ORAL EVERY 8 HOURS PRN
Status: CANCELLED | OUTPATIENT
Start: 2024-12-15

## 2024-12-15 RX ORDER — LANOLIN ALCOHOL/MO/W.PET/CERES
100 CREAM (GRAM) TOPICAL DAILY
Status: CANCELLED | OUTPATIENT
Start: 2024-12-16

## 2024-12-15 RX ORDER — BACLOFEN 10 MG/1
10 TABLET ORAL 3 TIMES DAILY
Status: CANCELLED | OUTPATIENT
Start: 2024-12-15

## 2024-12-15 RX ORDER — LORAZEPAM 2 MG/ML
2 INJECTION INTRAMUSCULAR
OUTPATIENT
Start: 2024-12-15

## 2024-12-15 RX ORDER — MAGNESIUM SULFATE IN WATER 40 MG/ML
2000 INJECTION, SOLUTION INTRAVENOUS PRN
Status: CANCELLED | OUTPATIENT
Start: 2024-12-15

## 2024-12-15 RX ORDER — PAROXETINE 10 MG/1
10 TABLET, FILM COATED ORAL EVERY MORNING
Status: CANCELLED | OUTPATIENT
Start: 2024-12-16

## 2024-12-15 RX ORDER — ONDANSETRON 2 MG/ML
4 INJECTION INTRAMUSCULAR; INTRAVENOUS EVERY 6 HOURS PRN
Status: CANCELLED | OUTPATIENT
Start: 2024-12-15

## 2024-12-15 RX ORDER — ACETAMINOPHEN 325 MG/1
650 TABLET ORAL EVERY 6 HOURS PRN
Status: CANCELLED | OUTPATIENT
Start: 2024-12-15

## 2024-12-15 RX ORDER — SODIUM CHLORIDE 9 MG/ML
INJECTION, SOLUTION INTRAVENOUS PRN
Status: CANCELLED | OUTPATIENT
Start: 2024-12-15

## 2024-12-15 RX ORDER — SODIUM CHLORIDE 0.9 % (FLUSH) 0.9 %
5-40 SYRINGE (ML) INJECTION EVERY 12 HOURS SCHEDULED
Status: CANCELLED | OUTPATIENT
Start: 2024-12-15

## 2024-12-15 RX ORDER — LORAZEPAM 1 MG/1
1 TABLET ORAL
OUTPATIENT
Start: 2024-12-15

## 2024-12-15 RX ORDER — LORAZEPAM 1 MG/1
3 TABLET ORAL
OUTPATIENT
Start: 2024-12-15

## 2024-12-15 RX ADMIN — PAROXETINE 10 MG: 10 TABLET, FILM COATED ORAL at 08:31

## 2024-12-15 RX ADMIN — Medication 100 MG: at 08:31

## 2024-12-15 RX ADMIN — SODIUM CHLORIDE, PRESERVATIVE FREE 10 ML: 5 INJECTION INTRAVENOUS at 08:33

## 2024-12-15 RX ADMIN — LORAZEPAM 1 MG: 2 INJECTION INTRAMUSCULAR; INTRAVENOUS at 19:31

## 2024-12-15 RX ADMIN — BACLOFEN 10 MG: 10 TABLET ORAL at 19:31

## 2024-12-15 RX ADMIN — SODIUM CHLORIDE, PRESERVATIVE FREE 10 ML: 5 INJECTION INTRAVENOUS at 19:32

## 2024-12-15 RX ADMIN — BACLOFEN 10 MG: 10 TABLET ORAL at 08:31

## 2024-12-15 RX ADMIN — FOLIC ACID 1 MG: 1 TABLET ORAL at 08:31

## 2024-12-15 RX ADMIN — ENOXAPARIN SODIUM 40 MG: 100 INJECTION SUBCUTANEOUS at 08:35

## 2024-12-15 RX ADMIN — BACLOFEN 10 MG: 10 TABLET ORAL at 13:39

## 2024-12-15 NOTE — ED NOTES
SW spoke with Tameka at [facility] ([facility]) and informed her current ETOH and CIWA levels were needed for pt.

## 2024-12-16 LAB
ALBUMIN SERPL-MCNC: 4 G/DL (ref 3.5–5.2)
ALP SERPL-CCNC: 69 U/L (ref 40–129)
ALT SERPL-CCNC: 31 U/L (ref 0–40)
ANION GAP SERPL CALCULATED.3IONS-SCNC: 11 MMOL/L (ref 7–16)
AST SERPL-CCNC: 47 U/L (ref 0–39)
BASOPHILS # BLD: 0.05 K/UL (ref 0–0.2)
BASOPHILS NFR BLD: 1 % (ref 0–2)
BILIRUB SERPL-MCNC: 1 MG/DL (ref 0–1.2)
BUN SERPL-MCNC: 9 MG/DL (ref 6–20)
CALCIUM SERPL-MCNC: 9.4 MG/DL (ref 8.6–10.2)
CHLORIDE SERPL-SCNC: 101 MMOL/L (ref 98–107)
CO2 SERPL-SCNC: 24 MMOL/L (ref 22–29)
CREAT SERPL-MCNC: 0.8 MG/DL (ref 0.7–1.2)
EOSINOPHIL # BLD: 0.17 K/UL (ref 0.05–0.5)
EOSINOPHILS RELATIVE PERCENT: 3 % (ref 0–6)
ERYTHROCYTE [DISTWIDTH] IN BLOOD BY AUTOMATED COUNT: 13.4 % (ref 11.5–15)
GFR, ESTIMATED: >90 ML/MIN/1.73M2
GLUCOSE SERPL-MCNC: 102 MG/DL (ref 74–99)
HCT VFR BLD AUTO: 45.7 % (ref 37–54)
HGB BLD-MCNC: 15.5 G/DL (ref 12.5–16.5)
IMM GRANULOCYTES # BLD AUTO: <0.03 K/UL (ref 0–0.58)
IMM GRANULOCYTES NFR BLD: 0 % (ref 0–5)
LYMPHOCYTES NFR BLD: 2.41 K/UL (ref 1.5–4)
LYMPHOCYTES RELATIVE PERCENT: 38 % (ref 20–42)
MCH RBC QN AUTO: 34.8 PG (ref 26–35)
MCHC RBC AUTO-ENTMCNC: 33.9 G/DL (ref 32–34.5)
MCV RBC AUTO: 102.5 FL (ref 80–99.9)
MONOCYTES NFR BLD: 0.65 K/UL (ref 0.1–0.95)
MONOCYTES NFR BLD: 10 % (ref 2–12)
NEUTROPHILS NFR BLD: 48 % (ref 43–80)
NEUTS SEG NFR BLD: 3.1 K/UL (ref 1.8–7.3)
PLATELET # BLD AUTO: 197 K/UL (ref 130–450)
PMV BLD AUTO: 10.7 FL (ref 7–12)
POTASSIUM SERPL-SCNC: 4 MMOL/L (ref 3.5–5)
PROT SERPL-MCNC: 6.5 G/DL (ref 6.4–8.3)
RBC # BLD AUTO: 4.46 M/UL (ref 3.8–5.8)
SODIUM SERPL-SCNC: 136 MMOL/L (ref 132–146)
WBC OTHER # BLD: 6.4 K/UL (ref 4.5–11.5)

## 2024-12-16 PROCEDURE — 2580000003 HC RX 258: Performed by: PHYSICIAN ASSISTANT

## 2024-12-16 PROCEDURE — 6370000000 HC RX 637 (ALT 250 FOR IP): Performed by: PHYSICIAN ASSISTANT

## 2024-12-16 PROCEDURE — 6360000002 HC RX W HCPCS: Performed by: PHYSICIAN ASSISTANT

## 2024-12-16 PROCEDURE — 80053 COMPREHEN METABOLIC PANEL: CPT

## 2024-12-16 PROCEDURE — 2060000000 HC ICU INTERMEDIATE R&B

## 2024-12-16 PROCEDURE — 6370000000 HC RX 637 (ALT 250 FOR IP): Performed by: INTERNAL MEDICINE

## 2024-12-16 PROCEDURE — 85025 COMPLETE CBC W/AUTO DIFF WBC: CPT

## 2024-12-16 RX ADMIN — FOLIC ACID 1 MG: 1 TABLET ORAL at 07:30

## 2024-12-16 RX ADMIN — BACLOFEN 10 MG: 10 TABLET ORAL at 22:08

## 2024-12-16 RX ADMIN — Medication 100 MG: at 07:30

## 2024-12-16 RX ADMIN — SODIUM CHLORIDE, PRESERVATIVE FREE 10 ML: 5 INJECTION INTRAVENOUS at 12:03

## 2024-12-16 RX ADMIN — ENOXAPARIN SODIUM 40 MG: 100 INJECTION SUBCUTANEOUS at 07:30

## 2024-12-16 RX ADMIN — LORAZEPAM 2 MG: 2 INJECTION INTRAMUSCULAR; INTRAVENOUS at 07:54

## 2024-12-16 RX ADMIN — LORAZEPAM 1 MG: 1 TABLET ORAL at 22:12

## 2024-12-16 RX ADMIN — BACLOFEN 10 MG: 10 TABLET ORAL at 07:30

## 2024-12-16 RX ADMIN — PAROXETINE 10 MG: 10 TABLET, FILM COATED ORAL at 11:44

## 2024-12-16 RX ADMIN — SODIUM CHLORIDE, PRESERVATIVE FREE 10 ML: 5 INJECTION INTRAVENOUS at 22:08

## 2024-12-16 ASSESSMENT — PAIN SCALES - GENERAL
PAINLEVEL_OUTOF10: 0
PAINLEVEL_OUTOF10: 0

## 2024-12-16 NOTE — CARE COORDINATION
Per Section G staff, patient is medically cleared for admission to the inpatient psychiatric unit. Chart reviewed. Patient will require psychiatric consult/evaluation prior to acceptance at [facility] unit.    TAD Kaur, YAJAIRA-S  Behavioral Health

## 2024-12-16 NOTE — CARE COORDINATION
Chart review completed. Pt is pink slipped w/psych accepting for inpatient psych hospitalization. Transport form completed for [facility]. Await medical clearance for transfer. Pt has a PCP. CM notified Angi carson/[employer] to verify insurance, none listed. CM also made a referral to Donita carson/[facility] who will have Mirella follow at [facility].   Itzel Keys, MEGN, RN  [facility] Case Management  [phone]

## 2024-12-16 NOTE — PLAN OF CARE
Problem: Safety - Adult  Goal: Free from fall injury  12/16/2024 0736 by Brionna Lozoya RN  Outcome: Progressing  12/16/2024 0036 by Lidia Corcoran RN  Outcome: Progressing     Problem: Self Harm/Suicidality  Goal: Will have no self-injury during hospital stay  Description: INTERVENTIONS:  1.  Ensure constant observer at bedside with Q15M safety checks  2.  Maintain a safe environment  3.  Secure patient belongings  4.  Ensure family/visitors adhere to safety recommendations  5.  Ensure safety tray has been added to patient's diet order  6.  Every shift and PRN: Re-assess suicidal risk via Frequent Screener    12/16/2024 0736 by Brionna Lozoya, RN  Outcome: Progressing  12/16/2024 0036 by Lidia Corcoran, RN  Outcome: Progressing

## 2024-12-16 NOTE — DISCHARGE SUMMARY
Internal Medicine Discharge Summary    NAME: Michelet Anthony :  1985  MRN:  [id number] PCP:Neri Yadav DO    ADMITTED: 2024   DISCHARGED: No discharge date for patient encounter.    ADMITTING PHYSICIAN: Be Devries MD    PCP: Neri Yadav DO    CONSULTANT(S):   IP CONSULT TO SOCIAL WORK  IP CONSULT TO SOCIAL WORK  IP CONSULT TO TELE-PSYCH (SOCIAL WORK ONLY)  IP CONSULT TO HOSPITALIST  IP CONSULT TO INTERNAL MEDICINE  IP CONSULT TO SOCIAL WORK  IP CONSULT TO PSYCHIATRY  IP CONSULT TO SOCIAL WORK  IP CONSULT TO HOSPITALIST  IP CONSULT TO INTERNAL MEDICINE     ADMITTING DIAGNOSIS:   Suicidal ideation [R45.851]  ETOH abuse [F10.10]  Alcohol withdrawal syndrome without complication (HCC) [F10.930]  Alcohol abuse with withdrawal (HCC) [F10.139]     Please see H&P for further details    DISCHARGE DIAGNOSES:   Active Hospital Problems    Diagnosis     Alcohol abuse with withdrawal (HCC) [F10.139]     Depression [F32.A]     Suicidal ideation [R45.851]        BRIEF HISTORY OF PRESENT ILLNESS: Michelet Anthony is a 39 y.o. male patient of Neri Yadav DO who  has a past medical history of Benign essential tremor and Tremor. who originally had concerns including Suicidal (si.thoughts for last couple days.  plus alcohol withdrawal last drink 48 hours ago. Was drinking about a pint a day. ). at presentation on 2024, and was found to have Suicidal ideation [R45.851]  ETOH abuse [F10.10]  Alcohol withdrawal syndrome without complication (HCC) [F10.930]  Alcohol abuse with withdrawal (HCC) [F10.139] after workup.    Please see H&P for further details.    HOSPITAL COURSE:   The patient presented to the hospital with the chief complaint of Suicidal (si.thoughts for last couple days.  plus alcohol withdrawal last drink 48 hours ago. Was drinking about a pint a day. )  . The patient was admitted to the hospital.     Patient doing well at this time.  No further episodes of withdrawal indicating need for

## 2024-12-17 PROCEDURE — 2500000003 HC RX 250 WO HCPCS: Performed by: PHYSICIAN ASSISTANT

## 2024-12-17 PROCEDURE — 6370000000 HC RX 637 (ALT 250 FOR IP): Performed by: INTERNAL MEDICINE

## 2024-12-17 PROCEDURE — 6370000000 HC RX 637 (ALT 250 FOR IP): Performed by: PSYCHIATRY & NEUROLOGY

## 2024-12-17 PROCEDURE — 6370000000 HC RX 637 (ALT 250 FOR IP): Performed by: PHYSICIAN ASSISTANT

## 2024-12-17 PROCEDURE — 6360000002 HC RX W HCPCS: Performed by: PHYSICIAN ASSISTANT

## 2024-12-17 PROCEDURE — 2580000003 HC RX 258: Performed by: PHYSICIAN ASSISTANT

## 2024-12-17 PROCEDURE — 2060000000 HC ICU INTERMEDIATE R&B

## 2024-12-17 RX ORDER — PAROXETINE 20 MG/1
20 TABLET, FILM COATED ORAL NIGHTLY
Status: DISCONTINUED | OUTPATIENT
Start: 2024-12-18 | End: 2024-12-18 | Stop reason: HOSPADM

## 2024-12-17 RX ORDER — PAROXETINE 10 MG/1
10 TABLET, FILM COATED ORAL ONCE
Status: COMPLETED | OUTPATIENT
Start: 2024-12-17 | End: 2024-12-17

## 2024-12-17 RX ADMIN — BACLOFEN 10 MG: 10 TABLET ORAL at 08:12

## 2024-12-17 RX ADMIN — LORAZEPAM 2 MG: 1 TABLET ORAL at 12:54

## 2024-12-17 RX ADMIN — Medication 100 MG: at 08:12

## 2024-12-17 RX ADMIN — SODIUM CHLORIDE, PRESERVATIVE FREE 10 ML: 5 INJECTION INTRAVENOUS at 20:26

## 2024-12-17 RX ADMIN — BACLOFEN 10 MG: 10 TABLET ORAL at 20:26

## 2024-12-17 RX ADMIN — PAROXETINE 10 MG: 10 TABLET, FILM COATED ORAL at 20:26

## 2024-12-17 RX ADMIN — LORAZEPAM 3 MG: 1 TABLET ORAL at 16:18

## 2024-12-17 RX ADMIN — ENOXAPARIN SODIUM 40 MG: 100 INJECTION SUBCUTANEOUS at 08:12

## 2024-12-17 RX ADMIN — SODIUM CHLORIDE, PRESERVATIVE FREE 10 ML: 5 INJECTION INTRAVENOUS at 08:18

## 2024-12-17 RX ADMIN — LORAZEPAM 1 MG: 1 TABLET ORAL at 08:12

## 2024-12-17 RX ADMIN — BACLOFEN 10 MG: 10 TABLET ORAL at 12:54

## 2024-12-17 RX ADMIN — FOLIC ACID 1 MG: 1 TABLET ORAL at 08:12

## 2024-12-17 RX ADMIN — PAROXETINE 10 MG: 10 TABLET, FILM COATED ORAL at 08:12

## 2024-12-17 NOTE — PLAN OF CARE
Problem: Safety - Adult  Goal: Free from fall injury  12/17/2024 1004 by Michael Renae RN  Outcome: Progressing  12/16/2024 2117 by Edwige Pardo RN  Outcome: Progressing     Problem: Self Harm/Suicidality  Goal: Will have no self-injury during hospital stay  Description: INTERVENTIONS:  1.  Ensure constant observer at bedside with Q15M safety checks  2.  Maintain a safe environment  3.  Secure patient belongings  4.  Ensure family/visitors adhere to safety recommendations  5.  Ensure safety tray has been added to patient's diet order  6.  Every shift and PRN: Re-assess suicidal risk via Frequent Screener    12/17/2024 1004 by Michael Renae RN  Outcome: Progressing  12/16/2024 2117 by Edwige Pardo RN  Outcome: Progressing     Problem: Pain  Goal: Verbalizes/displays adequate comfort level or baseline comfort level  12/17/2024 1004 by Michael Renae RN  Outcome: Progressing  12/16/2024 2117 by Edwige Pardo RN  Outcome: Progressing  Flowsheets  Taken 12/16/2024 1501 by Brionna Lozoya RN  Verbalizes/displays adequate comfort level or baseline comfort level:   Assess pain using appropriate pain scale   Encourage patient to monitor pain and request assistance  Taken 12/16/2024 1121 by Brionna Lozoya RN  Verbalizes/displays adequate comfort level or baseline comfort level:   Assess pain using appropriate pain scale   Encourage patient to monitor pain and request assistance

## 2024-12-17 NOTE — BH NOTE
It was seen that pt had elevated CIWA scores and this was discussed with Dr. Rowland. He opted to defer admission, possibly until tomorrow, as long as CIWA scores are low and pt is stable. Floor advised and admitting advised.   
Pt chart reviewed for acceptance to behavioral health. Dr. Rowland has been contacted for the review and has accepted admission. Standard admit orders to [facility] to be contacted for admission update.   
Statement Selected

## 2024-12-17 NOTE — ED NOTES
Jason called from [facility] [address]. and stated that they spoke to Dr. Machado again regarding this patient and he felt that it would be better if the patient were to wait until tomorrow morning to be reviewed again.  Informed Johann that the pink slip will be expiring tomorrow.  He stated he will let Allison know the pink slip will be expiring.

## 2024-12-17 NOTE — PLAN OF CARE
Problem: Safety - Adult  Goal: Free from fall injury  12/17/2024 1008 by Michael Renae RN  Outcome: Progressing  12/17/2024 1004 by Michael Renae RN  Outcome: Progressing  12/16/2024 2117 by Edwige Pardo RN  Outcome: Progressing     Problem: Self Harm/Suicidality  Goal: Will have no self-injury during hospital stay  Description: INTERVENTIONS:  1.  Ensure constant observer at bedside with Q15M safety checks  2.  Maintain a safe environment  3.  Secure patient belongings  4.  Ensure family/visitors adhere to safety recommendations  5.  Ensure safety tray has been added to patient's diet order  6.  Every shift and PRN: Re-assess suicidal risk via Frequent Screener    12/17/2024 1008 by Michael Renae RN  Outcome: Progressing  12/17/2024 1004 by Michael Renae RN  Outcome: Progressing  12/16/2024 2117 by Edwige Pardo RN  Outcome: Progressing     Problem: Pain  Goal: Verbalizes/displays adequate comfort level or baseline comfort level  12/17/2024 1008 by Michael Renae RN  Outcome: Progressing  12/17/2024 1004 by Michael Renae RN  Outcome: Progressing  12/16/2024 2117 by Edwige Pardo RN  Outcome: Progressing  Flowsheets  Taken 12/16/2024 1501 by Brionna Lozoya RN  Verbalizes/displays adequate comfort level or baseline comfort level:   Assess pain using appropriate pain scale   Encourage patient to monitor pain and request assistance  Taken 12/16/2024 1121 by Brionna Lozoya RN  Verbalizes/displays adequate comfort level or baseline comfort level:   Assess pain using appropriate pain scale   Encourage patient to monitor pain and request assistance

## 2024-12-17 NOTE — PLAN OF CARE
Problem: Safety - Adult  Goal: Free from fall injury  12/16/2024 2117 by Edwige Pardo RN  Outcome: Progressing  12/16/2024 0736 by Brionna Lozoya RN  Outcome: Progressing     Problem: Self Harm/Suicidality  Goal: Will have no self-injury during hospital stay  Description: INTERVENTIONS:  1.  Ensure constant observer at bedside with Q15M safety checks  2.  Maintain a safe environment  3.  Secure patient belongings  4.  Ensure family/visitors adhere to safety recommendations  5.  Ensure safety tray has been added to patient's diet order  6.  Every shift and PRN: Re-assess suicidal risk via Frequent Screener    12/16/2024 2117 by Edwige Pardo RN  Outcome: Progressing  12/16/2024 0736 by Brionna Lozoya RN  Outcome: Progressing     Problem: Pain  Goal: Verbalizes/displays adequate comfort level or baseline comfort level  Outcome: Progressing  Flowsheets  Taken 12/16/2024 1501 by Brionna Lozoya RN  Verbalizes/displays adequate comfort level or baseline comfort level:   Assess pain using appropriate pain scale   Encourage patient to monitor pain and request assistance  Taken 12/16/2024 1121 by Brionna Lozoya RN  Verbalizes/displays adequate comfort level or baseline comfort level:   Assess pain using appropriate pain scale   Encourage patient to monitor pain and request assistance

## 2024-12-17 NOTE — ED NOTES
The pt was accepted to [address] [place] [id number].  Disposition called to Mark in admitting.  Accepting info given to Maite on the floor.

## 2024-12-18 ENCOUNTER — HOSPITAL ENCOUNTER (INPATIENT)
Age: 39
LOS: 6 days | Discharge: HOME | DRG: 885 | End: 2024-12-24
Attending: PSYCHIATRY & NEUROLOGY
Payer: MEDICAID

## 2024-12-18 VITALS
WEIGHT: 134.48 LBS | SYSTOLIC BLOOD PRESSURE: 115 MMHG | HEIGHT: 70 IN | HEART RATE: 85 BPM | RESPIRATION RATE: 18 BRPM | BODY MASS INDEX: 19.25 KG/M2 | OXYGEN SATURATION: 98 % | DIASTOLIC BLOOD PRESSURE: 77 MMHG | TEMPERATURE: 99 F

## 2024-12-18 PROBLEM — F39 MOOD DISORDER (HCC): Status: ACTIVE | Noted: 2024-12-18

## 2024-12-18 PROCEDURE — 2500000003 HC RX 250 WO HCPCS: Performed by: PHYSICIAN ASSISTANT

## 2024-12-18 PROCEDURE — 6370000000 HC RX 637 (ALT 250 FOR IP)

## 2024-12-18 PROCEDURE — 6360000002 HC RX W HCPCS: Performed by: PHYSICIAN ASSISTANT

## 2024-12-18 PROCEDURE — 6370000000 HC RX 637 (ALT 250 FOR IP): Performed by: INTERNAL MEDICINE

## 2024-12-18 PROCEDURE — 1240000000 HC EMOTIONAL WELLNESS R&B

## 2024-12-18 PROCEDURE — 6370000000 HC RX 637 (ALT 250 FOR IP): Performed by: PSYCHIATRY & NEUROLOGY

## 2024-12-18 PROCEDURE — 6370000000 HC RX 637 (ALT 250 FOR IP): Performed by: PHYSICIAN ASSISTANT

## 2024-12-18 RX ORDER — LANOLIN ALCOHOL/MO/W.PET/CERES
100 CREAM (GRAM) TOPICAL DAILY
Status: DISCONTINUED | OUTPATIENT
Start: 2024-12-18 | End: 2024-12-24 | Stop reason: HOSPADM

## 2024-12-18 RX ORDER — HALOPERIDOL 5 MG/1
5 TABLET ORAL EVERY 6 HOURS PRN
Status: DISCONTINUED | OUTPATIENT
Start: 2024-12-18 | End: 2024-12-24 | Stop reason: HOSPADM

## 2024-12-18 RX ORDER — CHLORDIAZEPOXIDE HYDROCHLORIDE 25 MG/1
25 CAPSULE, GELATIN COATED ORAL EVERY 4 HOURS
Status: DISCONTINUED | OUTPATIENT
Start: 2024-12-18 | End: 2024-12-19

## 2024-12-18 RX ORDER — DIVALPROEX SODIUM 250 MG/1
250 TABLET, DELAYED RELEASE ORAL EVERY 12 HOURS SCHEDULED
Status: DISCONTINUED | OUTPATIENT
Start: 2024-12-18 | End: 2024-12-24 | Stop reason: HOSPADM

## 2024-12-18 RX ORDER — ONDANSETRON 2 MG/ML
4 INJECTION INTRAMUSCULAR; INTRAVENOUS EVERY 6 HOURS PRN
Status: DISCONTINUED | OUTPATIENT
Start: 2024-12-18 | End: 2024-12-24 | Stop reason: HOSPADM

## 2024-12-18 RX ORDER — NICOTINE 21 MG/24HR
1 PATCH, TRANSDERMAL 24 HOURS TRANSDERMAL DAILY
Status: DISCONTINUED | OUTPATIENT
Start: 2024-12-18 | End: 2024-12-24 | Stop reason: HOSPADM

## 2024-12-18 RX ORDER — LANOLIN ALCOHOL/MO/W.PET/CERES
100 CREAM (GRAM) TOPICAL DAILY
Status: DISCONTINUED | OUTPATIENT
Start: 2024-12-18 | End: 2024-12-18

## 2024-12-18 RX ORDER — SENNOSIDES A AND B 8.6 MG/1
1 TABLET, FILM COATED ORAL DAILY PRN
Status: DISCONTINUED | OUTPATIENT
Start: 2024-12-18 | End: 2024-12-24 | Stop reason: HOSPADM

## 2024-12-18 RX ORDER — POTASSIUM CHLORIDE 1500 MG/1
40 TABLET, EXTENDED RELEASE ORAL PRN
Status: DISCONTINUED | OUTPATIENT
Start: 2024-12-18 | End: 2024-12-19

## 2024-12-18 RX ORDER — BACLOFEN 10 MG/1
10 TABLET ORAL 3 TIMES DAILY
Status: DISCONTINUED | OUTPATIENT
Start: 2024-12-18 | End: 2024-12-24 | Stop reason: HOSPADM

## 2024-12-18 RX ORDER — FOLIC ACID 1 MG/1
1 TABLET ORAL DAILY
Status: DISCONTINUED | OUTPATIENT
Start: 2024-12-18 | End: 2024-12-24 | Stop reason: HOSPADM

## 2024-12-18 RX ORDER — MAGNESIUM SULFATE IN WATER 40 MG/ML
2000 INJECTION, SOLUTION INTRAVENOUS PRN
Status: DISCONTINUED | OUTPATIENT
Start: 2024-12-18 | End: 2024-12-19

## 2024-12-18 RX ORDER — NICOTINE 21 MG/24HR
1 PATCH, TRANSDERMAL 24 HOURS TRANSDERMAL DAILY
Status: DISCONTINUED | OUTPATIENT
Start: 2024-12-18 | End: 2024-12-18

## 2024-12-18 RX ORDER — ACETAMINOPHEN 650 MG/1
650 SUPPOSITORY RECTAL EVERY 6 HOURS PRN
Status: DISCONTINUED | OUTPATIENT
Start: 2024-12-18 | End: 2024-12-24 | Stop reason: HOSPADM

## 2024-12-18 RX ORDER — SODIUM CHLORIDE 9 MG/ML
INJECTION, SOLUTION INTRAVENOUS PRN
Status: DISCONTINUED | OUTPATIENT
Start: 2024-12-18 | End: 2024-12-19

## 2024-12-18 RX ORDER — HYDROXYZINE HYDROCHLORIDE 50 MG/1
50 TABLET, FILM COATED ORAL 3 TIMES DAILY PRN
Status: DISCONTINUED | OUTPATIENT
Start: 2024-12-18 | End: 2024-12-24 | Stop reason: HOSPADM

## 2024-12-18 RX ORDER — MULTIVITAMIN WITH IRON
1 TABLET ORAL DAILY
Status: DISCONTINUED | OUTPATIENT
Start: 2024-12-18 | End: 2024-12-24 | Stop reason: HOSPADM

## 2024-12-18 RX ORDER — FOLIC ACID 1 MG/1
1 TABLET ORAL DAILY
Status: DISCONTINUED | OUTPATIENT
Start: 2024-12-18 | End: 2024-12-18

## 2024-12-18 RX ORDER — ACETAMINOPHEN 325 MG/1
650 TABLET ORAL EVERY 6 HOURS PRN
Status: DISCONTINUED | OUTPATIENT
Start: 2024-12-18 | End: 2024-12-24 | Stop reason: HOSPADM

## 2024-12-18 RX ORDER — ONDANSETRON 4 MG/1
4 TABLET, ORALLY DISINTEGRATING ORAL EVERY 8 HOURS PRN
Status: DISCONTINUED | OUTPATIENT
Start: 2024-12-18 | End: 2024-12-24 | Stop reason: HOSPADM

## 2024-12-18 RX ORDER — ACETAMINOPHEN 325 MG/1
650 TABLET ORAL EVERY 6 HOURS PRN
Status: DISCONTINUED | OUTPATIENT
Start: 2024-12-18 | End: 2024-12-19 | Stop reason: SDUPTHER

## 2024-12-18 RX ORDER — SODIUM CHLORIDE 0.9 % (FLUSH) 0.9 %
5-40 SYRINGE (ML) INJECTION PRN
Status: DISCONTINUED | OUTPATIENT
Start: 2024-12-18 | End: 2024-12-19

## 2024-12-18 RX ORDER — OXYCODONE AND ACETAMINOPHEN 5; 325 MG/1; MG/1
1 TABLET ORAL EVERY 4 HOURS PRN
Status: DISCONTINUED | OUTPATIENT
Start: 2024-12-18 | End: 2024-12-19

## 2024-12-18 RX ORDER — SODIUM CHLORIDE 0.9 % (FLUSH) 0.9 %
5-40 SYRINGE (ML) INJECTION EVERY 12 HOURS SCHEDULED
Status: DISCONTINUED | OUTPATIENT
Start: 2024-12-18 | End: 2024-12-19

## 2024-12-18 RX ORDER — HALOPERIDOL 5 MG/ML
5 INJECTION INTRAMUSCULAR EVERY 6 HOURS PRN
Status: DISCONTINUED | OUTPATIENT
Start: 2024-12-18 | End: 2024-12-24 | Stop reason: HOSPADM

## 2024-12-18 RX ORDER — MAGNESIUM HYDROXIDE/ALUMINUM HYDROXICE/SIMETHICONE 120; 1200; 1200 MG/30ML; MG/30ML; MG/30ML
30 SUSPENSION ORAL PRN
Status: DISCONTINUED | OUTPATIENT
Start: 2024-12-18 | End: 2024-12-24 | Stop reason: HOSPADM

## 2024-12-18 RX ADMIN — BACLOFEN 10 MG: 10 TABLET ORAL at 21:32

## 2024-12-18 RX ADMIN — FOLIC ACID 1 MG: 1 TABLET ORAL at 08:51

## 2024-12-18 RX ADMIN — ENOXAPARIN SODIUM 40 MG: 100 INJECTION SUBCUTANEOUS at 08:51

## 2024-12-18 RX ADMIN — Medication 100 MG: at 14:47

## 2024-12-18 RX ADMIN — MULTIVITAMIN TABLET 1 TABLET: TABLET at 14:47

## 2024-12-18 RX ADMIN — BACLOFEN 10 MG: 10 TABLET ORAL at 08:51

## 2024-12-18 RX ADMIN — Medication 100 MG: at 08:51

## 2024-12-18 RX ADMIN — CHLORDIAZEPOXIDE HYDROCHLORIDE 25 MG: 25 CAPSULE ORAL at 18:49

## 2024-12-18 RX ADMIN — Medication 1 MG: at 14:48

## 2024-12-18 RX ADMIN — SODIUM CHLORIDE, PRESERVATIVE FREE 10 ML: 5 INJECTION INTRAVENOUS at 08:55

## 2024-12-18 RX ADMIN — CHLORDIAZEPOXIDE HYDROCHLORIDE 25 MG: 25 CAPSULE ORAL at 23:02

## 2024-12-18 RX ADMIN — DIVALPROEX SODIUM 250 MG: 250 TABLET, DELAYED RELEASE ORAL at 20:22

## 2024-12-18 RX ADMIN — CHLORDIAZEPOXIDE HYDROCHLORIDE 25 MG: 25 CAPSULE ORAL at 14:47

## 2024-12-18 RX ADMIN — Medication 3 MG: at 20:22

## 2024-12-18 ASSESSMENT — PATIENT HEALTH QUESTIONNAIRE - PHQ9
SUM OF ALL RESPONSES TO PHQ QUESTIONS 1-9: 16
SUM OF ALL RESPONSES TO PHQ QUESTIONS 1-9: 16
9. THOUGHTS THAT YOU WOULD BE BETTER OFF DEAD, OR OF HURTING YOURSELF: MORE THAN HALF THE DAYS
SUM OF ALL RESPONSES TO PHQ QUESTIONS 1-9: 16
2. FEELING DOWN, DEPRESSED OR HOPELESS: NEARLY EVERY DAY
4. FEELING TIRED OR HAVING LITTLE ENERGY: NEARLY EVERY DAY
10. IF YOU CHECKED OFF ANY PROBLEMS, HOW DIFFICULT HAVE THESE PROBLEMS MADE IT FOR YOU TO DO YOUR WORK, TAKE CARE OF THINGS AT HOME, OR GET ALONG WITH OTHER PEOPLE: SOMEWHAT DIFFICULT
5. POOR APPETITE OR OVEREATING: NOT AT ALL
1. LITTLE INTEREST OR PLEASURE IN DOING THINGS: NEARLY EVERY DAY
8. MOVING OR SPEAKING SO SLOWLY THAT OTHER PEOPLE COULD HAVE NOTICED. OR THE OPPOSITE, BEING SO FIGETY OR RESTLESS THAT YOU HAVE BEEN MOVING AROUND A LOT MORE THAN USUAL: NOT AT ALL
SUM OF ALL RESPONSES TO PHQ9 QUESTIONS 1 & 2: 6
SUM OF ALL RESPONSES TO PHQ QUESTIONS 1-9: 14
3. TROUBLE FALLING OR STAYING ASLEEP: NEARLY EVERY DAY
6. FEELING BAD ABOUT YOURSELF - OR THAT YOU ARE A FAILURE OR HAVE LET YOURSELF OR YOUR FAMILY DOWN: MORE THAN HALF THE DAYS
7. TROUBLE CONCENTRATING ON THINGS, SUCH AS READING THE NEWSPAPER OR WATCHING TELEVISION: NOT AT ALL

## 2024-12-18 ASSESSMENT — SLEEP AND FATIGUE QUESTIONNAIRES
DO YOU HAVE DIFFICULTY SLEEPING: YES
DO YOU USE A SLEEP AID: YES
AVERAGE NUMBER OF SLEEP HOURS: 5
SLEEP PATTERN: DIFFICULTY FALLING ASLEEP

## 2024-12-18 ASSESSMENT — PAIN SCALES - GENERAL
PAINLEVEL_OUTOF10: 0
PAINLEVEL_OUTOF10: 0

## 2024-12-18 NOTE — GROUP NOTE
Date: 12/18/2024  Start Time: 1515  End Time:  1550  Number of Participants: 6    Type of Group: Recreational    Wellness Binder Information  Module Name:  Logo [place]    Patient's Goal:  to increase socialization amongst peers.  To maintain/enhance cognitive function.     Notes:  CTRS facilitated the game Logos.  Patient was an active participant in activity and exhibited a receptive behavior.  Patient socialized well with CTRS and peers.     Status After Intervention:  Improved    Participation Level: Active Listener and Interactive    Participation Quality: Appropriate and Attentive      Speech:  normal      Thought Process/Content: Logical      Affective Functioning: Congruent      Mood: euthymic      Level of consciousness:  Alert and Attentive      Response to Learning: Able to verbalize current knowledge/experience, Able to verbalize/acknowledge new learning, and Progressing to goal      Endings: None Reported    Modes of Intervention: Socialization, Exploration, and Activity      Discipline Responsible: Recreational Therapist      Signature:  EMELY Washington    Group Therapy Note    Attendees: 6

## 2024-12-18 NOTE — BH NOTE
4 Eyes Skin Assessment     NAME:  Michelet Anthony  YOB: 1985  MEDICAL RECORD NUMBER:  [id number]    The patient is being assessed for  Admission    I agree that at least one RN has performed a thorough Head to Toe Skin Assessment on the patient. ALL assessment sites listed below have been assessed.      Areas assessed by both nurses:    Head, Face, Ears, Shoulders, Back, Chest, Arms, Elbows, Hands, Sacrum. Buttock, Coccyx, Ischium, Legs. Feet and Heels, and Under Medical Devices         Does the Patient have a Wound? No noted wound(s)       Charles Prevention initiated by RN: No  Wound Care Orders initiated by RN: No    Pressure Injury (Stage 3,4, Unstageable, DTI, NWPT, and Complex wounds) if present, place Wound referral order by RN under : No    New Ostomies, if present place, Ostomy referral order under : No     Nurse 1 eSignature: Electronically signed by Amy aMrcos RN on 12/18/24 at 2:25 PM EST    **SHARE this note so that the co-signing nurse can place an eSignature**    Nurse 2 eSignature: Electronically signed by Isha Boyce RN on 12/18/24 at 2:26 PM EST

## 2024-12-18 NOTE — PLAN OF CARE
Problem: Safety - Adult  Goal: Free from fall injury  12/17/2024 2320 by Michael Malone RN  Outcome: Progressing  12/17/2024 1008 by Michael Renae RN  Outcome: Progressing  12/17/2024 1004 by Michael Renae RN  Outcome: Progressing     Problem: Self Harm/Suicidality  Goal: Will have no self-injury during hospital stay  Description: INTERVENTIONS:  1.  Ensure constant observer at bedside with Q15M safety checks  2.  Maintain a safe environment  3.  Secure patient belongings  4.  Ensure family/visitors adhere to safety recommendations  5.  Ensure safety tray has been added to patient's diet order  6.  Every shift and PRN: Re-assess suicidal risk via Frequent Screener    12/17/2024 2320 by Michael Malone RN  Outcome: Progressing  12/17/2024 1008 by Michael Renae RN  Outcome: Progressing  12/17/2024 1004 by Michael Renae RN  Outcome: Progressing     Problem: Pain  Goal: Verbalizes/displays adequate comfort level or baseline comfort level  12/17/2024 2320 by Michael Malone RN  Outcome: Progressing  12/17/2024 1008 by Michael Renae RN  Outcome: Progressing  12/17/2024 1004 by Michael Renae RN  Outcome: Progressing

## 2024-12-18 NOTE — CARE COORDINATION
Pt is pink slipped w/psych accepting for inpatient psych hospitalization. Transport form completed for [facility]. Pt has a PCP. Medicaid application faxed by CM on 12/16, pt is pending Medicaid.  RACHELE Cruz, RN  [facility] Case Management  [phone]       confirmed w/LASHONDA Reyes on 7W that  bed 10A is available for pt and CM can arrange transportation. CM arranged ambulance transport for pt via [facility] with  time between 1230 -230 pm w/nursing notified.

## 2024-12-18 NOTE — PROGRESS NOTES
P Quality Flow/Interdisciplinary Rounds Progress Note        Quality Flow Rounds held on December 18, 2024    Disciplines Attending:  Bedside Nurse, , , and Nursing Unit Leadership    Michelet Anthony was admitted on 12/13/2024  7:31 AM    Anticipated Discharge Date:  Expected Discharge Date: 12/18/24    Disposition:    Charles Score:  Charles Scale Score: 20    [facility] RISK OF UNPLANNED READMISSION 2.0             7.7 Total Score        Discussed patient goal for the day, patient clinical progression, and barriers to discharge.  The following Goal(s) of the Day/Commitment(s) have been identified:   transfer to [facility] unit       Zainab Santiago RN  December 18, 2024        
  [facility] Quality Flow/Interdisciplinary Rounds Progress Note        Quality Flow Rounds held on December 17, 2024    Disciplines Attending:  Bedside Nurse, , , and Nursing Unit Leadership    Michelet Anthony was admitted on 12/13/2024  7:31 AM    Anticipated Discharge Date:  Expected Discharge Date: 12/17/24    Disposition:    Charles Score:  Charles Scale Score: 21    [facility] RISK OF UNPLANNED READMISSION 2.0             7.5 Total Score        Discussed patient goal for the day, patient clinical progression, and barriers to discharge.  The following Goal(s) of the Day/Commitment(s) have been identified:   CO-pink slipped, await psych eval, await final d/c plans based on mohan bhatti RN  December 17, 2024        
  [facility] Quality Flow/Interdisciplinary Rounds Progress Note        Quality Flow Rounds held on December 16, 2024    Disciplines Attending:  Bedside Nurse, , , and Nursing Unit Leadership    Michelet Anthony was admitted on 12/13/2024  7:31 AM    Anticipated Discharge Date:  Expected Discharge Date: 12/17/24    Disposition:    Charles Score:  Charles Scale Score: 21    [facility] RISK OF UNPLANNED READMISSION 2.0             7.5 Total Score        Discussed patient goal for the day, patient clinical progression, and barriers to discharge.  The following Goal(s) of the Day/Commitment(s) have been identified:   await transfer downtown      Ace Murphy RN  December 16, 2024        
4 Eyes Skin Assessment     NAME:  Michelet Anthony  YOB: 1985  MEDICAL RECORD NUMBER:  [id number]    The patient is being assessed for  Admission    I agree that at least one RN has performed a thorough Head to Toe Skin Assessment on the patient. ALL assessment sites listed below have been assessed.      Areas assessed by both nurses:    Head, Face, Ears, Shoulders, Back, Chest, Arms, Elbows, Hands, Sacrum. Buttock, Coccyx, Ischium, Legs. Feet and Heels, and Under Medical Devices         Does the Patient have a Wound? No noted wound(s)       Charles Prevention initiated by RN: No  Wound Care Orders initiated by RN: No    Pressure Injury (Stage 3,4, Unstageable, DTI, NWPT, and Complex wounds) if present, place Wound referral order by RN under : No    New Ostomies, if present place, Ostomy referral order under : No     Nurse 1 eSignature: Electronically signed by Lidia Corcoran RN on 12/14/24 at 3:09 AM EST    **SHARE this note so that the co-signing nurse can place an eSignature**    Nurse 2 eSignature: {Esignature:[id number]}  
[facility] made aware that patient is stable for discharge from Dr. Devries standpoint.   
Attending messaged regarding patient requesting nicotine patch.    Irina Alicea RN    
Call placed to [facility] Per dr Barcenas order, case discussed with them, I was instructed to fax the pink slip which was done and then I was transferred to the Behavioral health unit in which I presented this pt case to the nurse. This nurse will give me a call back to notify of what will occur from this point on.   
Faxed Pink Slip to [phone] with confirmation received.   
I have discussed with my partner Dr. Hardin and our NP about this patient.  I have reviewed the chart.  He is now medically cleared for discharge from the medicine service to be sent to inpatient psychiatry as planned.    Any further questions in regards to this please do not hesitate to contact me.    Lucina Fraser MD  12/15/2024  
Internal Medicine Progress Note    Patient's name: Michelet Anthony  : 1985  Chief complaints (on day of admission): Suicidal (si.thoughts for last couple days.  plus alcohol withdrawal last drink 48 hours ago. Was drinking about a pint a day. )  Admission date: 2024  Date of service: 12/15/2024   Room: [address] INTERMEDIATE  Primary care physician: Neri Yadav DO  Reason for visit: Follow-up for alcohol withdrawal    Subjective  Michelet seen and evaluated. In NAD.  Alert, oriented, he is very pleasant.  Sitter at bedside.  Reports overall he is feeling okay.  His CIWA score this morning at 6 AM was a 5. Last dose of Ativan was yesterday.  Awaiting transfer to inpatient psych -still no bed available.  Patient has no complaints otherwise.  No complaints or concerns reported by nursing overnight.    Review of Systems  Full 10 point ROS reviewed and negative must document above    Hospital Medications  Current Facility-Administered Medications   Medication Dose Route Frequency Provider Last Rate Last Admin    nicotine (NICODERM CQ) 21 MG/24HR 1 patch  1 patch TransDERmal Daily [place] MD   1 patch at 12/15/24 0830    oxyCODONE-acetaminophen (PERCOCET) 5-325 MG per tablet 1 tablet  1 tablet Oral Q4H PRN [place]ina MD        sodium chloride flush 0.9 % injection 5-40 mL  5-40 mL IntraVENous 2 times per day Kiko Chavesis, PA-C   10 mL at 12/15/24 0833    sodium chloride flush 0.9 % injection 5-40 mL  5-40 mL IntraVENous PRN Ritchie Chaves, PA-C        0.9 % sodium chloride infusion   IntraVENous PRN Kiko Chavesis, PA-C        thiamine tablet 100 mg  100 mg Oral Daily Anastacio, Ritchie, PA-C   100 mg at 12/15/24 0831    LORazepam (ATIVAN) tablet 1 mg  1 mg Oral Q1H PRN [place], Ritchie, PA-C   1 mg at 24 1318    Or    LORazepam (ATIVAN) injection 1 mg  1 mg IntraVENous Q1H PRN Anastacio, Ritchie, PA-C   1 mg at 24 0507    Or    LORazepam (ATIVAN) tablet 2 mg  2 mg Oral Q1H PRN Anastacio 
Internal Medicine Progress Note    Patient's name: Michelet Anthony  : 1985  Chief complaints (on day of admission): Suicidal (si.thoughts for last couple days.  plus alcohol withdrawal last drink 48 hours ago. Was drinking about a pint a day. )  Admission date: 2024  Date of service: 2024  Room: [address] INTERMEDIATE  Primary care physician: Neri Yadav DO  Reason for visit: Follow-up for alcohol withdrawal    Subjective  Michelet seen and evaluated. In NAD.  Alert, oriented, he is very pleasant.  Sitter at bedside.  Reports overall he is feeling okay.  His CIWA score has been 0.  Awaiting transfer to inpatient psych.  Patient apparently now needs a psychiatric consultation prior to acceptance at our own [facility] psychiatric facility despite being pink slipped in the ER with clear evidence of necessity.  Patient has no complaints otherwise.  No complaints or concerns reported by nursing overnight.  Has no other medical concerns at this time.    Review of Systems  Full 10 point ROS reviewed and negative must document above    Hospital Medications  Current Facility-Administered Medications   Medication Dose Route Frequency Provider Last Rate Last Admin    nicotine (NICODERM CQ) 21 MG/24HR 1 patch  1 patch TransDERmal Daily [place], Lucina SINGLETON MD   1 patch at 24 0813    oxyCODONE-acetaminophen (PERCOCET) 5-325 MG per tablet 1 tablet  1 tablet Oral Q4H PRN [place] MD        sodium chloride flush 0.9 % injection 5-40 mL  5-40 mL IntraVENous 2 times per day Ritchie Chaves PA-C   10 mL at 24 0818    sodium chloride flush 0.9 % injection 5-40 mL  5-40 mL IntraVENous PRN Ritchie Chaves PA-C        0.9 % sodium chloride infusion   IntraVENous PRN Ritchie Chaves PA-C        thiamine tablet 100 mg  100 mg Oral Daily Ritchie Chaves PA-C   100 mg at 24 0812    LORazepam (ATIVAN) tablet 1 mg  1 mg Oral Q1H PRN Ritchie Chaves PA-C   1 mg at 24 0812    Or    
New consult sent to Dr. Barcenas (Psychiatry)  
Nurse to nurse report called and given to [facility].   
Per notes from physician on 12/15, patient is medically stable for discharge.  Chart reviewed and I agree.    Electronically signed by Be Devries MD on 12/16/2024 at 1:10 PM    
Pt c/o moderate itching and mild agitation. Pt scored an 8 on the CIWA scale. 1mg ativan given per orders.   
Received call back from [facility] tonight they will take him in the AM as long as his CIWA scales are improving.     Transport cancelled at this time and pt placed in will call for the AM  
Referral information sent to [facility].   F: [phone]    Fax successfully sent.     
SpO2 79% on RA. Pt is refusing oxygen.   
Spiritual Health History and Assessment/Progress Note  [facility]    Loneliness/Social Isolation,  ,  ,      Name: Michelet Anthony MRN: [id number]    Age: 39 y.o.     Sex: male   Language: English   Adventism: None   Alcohol abuse with withdrawal (HCC)     Date: 12/14/2024                           Spiritual Assessment began in [facility] INTERMEDIATE        Referral/Consult From: Rounding   Encounter Overview/Reason: Loneliness/Social Isolation  Service Provided For: Patient    Maricarmen, Belief, Meaning:   Patient unable to assess at this time  Family/Friends No family/friends present      Importance and Influence:  Patient unable to assess at this time  Family/Friends No family/friends present    Community:  Patient Patient was sleeping.   prayed a silent prayer for the patient and left devotional material and information about  services.  Family/Friends No family/friends present    Assessment and Plan of Care:     Patient Interventions include: Patient was sleeping.   prayed a silent prayer for the patient and left devotional material and information about  services.  Family/Friends Interventions include: No family/friends present    Patient Plan of Care: Spiritual Care available upon further referral  Family/Friends Plan of Care: No family/friends present    Electronically signed by Chaplain Ramone on 12/14/2024 at 1:34 PM   
Spoke with Dr. Devries about patient needing psych evaluation before transfer downtown. Per Dr. Devries, refer patient to [facility] with number provided.     [phone] ext. [phone]      
Spoke with Dr. Devries, withdrawal request for transfer to [facility] [place] [facility].   
Transport arranged for pt to be picked up at 2130.   
2 mg IntraVENous Q1H PRN Anastacio, Ritchie, PA-C   2 mg at 12/16/24 0754    Or    LORazepam (ATIVAN) tablet 3 mg  3 mg Oral Q1H PRN Anastacio, Ritchie, PA-C   3 mg at 12/17/24 1618    Or    LORazepam (ATIVAN) injection 3 mg  3 mg IntraVENous Q1H PRN [place], Ritchie, PA-C        Or    LORazepam (ATIVAN) tablet 4 mg  4 mg Oral Q1H PRN Anastacio, Ritchie, PA-C        Or    LORazepam (ATIVAN) injection 4 mg  4 mg IntraVENous Q1H PRN Anastacio, Ritchie, PA-C        baclofen (LIORESAL) tablet 10 mg  10 mg Oral TID Anastacio, Ritchie, PA-C   10 mg at 12/18/24 0851    folic acid (FOLVITE) tablet 1 mg  1 mg Oral Daily Anastacio, Ritchie, PA-C   1 mg at 12/18/24 0851    potassium chloride (KLOR-CON M) extended release tablet 40 mEq  40 mEq Oral PRN Anastacio, Ritchie, PA-C        Or    potassium bicarb-citric acid (EFFER-K) effervescent tablet 40 mEq  40 mEq Oral PRN Anastacio, Ritchie, PA-C        magnesium sulfate 2000 mg in 50 mL IVPB premix  2,000 mg IntraVENous PRN [place], Ritchie, PA-C        enoxaparin (LOVENOX) injection 40 mg  40 mg SubCUTAneous Daily Anastacio, Ritchie, PA-C   40 mg at 12/18/24 0851    ondansetron (ZOFRAN-ODT) disintegrating tablet 4 mg  4 mg Oral Q8H PRN Anastacio, Ritchie, PA-C        Or    ondansetron (ZOFRAN) injection 4 mg  4 mg IntraVENous Q6H PRN [place], Ritchie, PA-C        senna (SENOKOT) tablet 8.6 mg  1 tablet Oral Daily PRN [place], Ritchie, PA-C        acetaminophen (TYLENOL) tablet 650 mg  650 mg Oral Q6H PRN Anastacio, Ritchie, PA-C        Or    acetaminophen (TYLENOL) suppository 650 mg  650 mg Rectal Q6H PRN Anastacio, Ritchie, PA-C           PRN Medications  oxyCODONE-acetaminophen, sodium chloride flush, sodium chloride, LORazepam **OR** LORazepam **OR** LORazepam **OR** LORazepam **OR** LORazepam **OR** LORazepam **OR** LORazepam **OR** LORazepam, potassium chloride **OR** potassium alternative oral replacement **OR** [DISCONTINUED] potassium chloride, magnesium sulfate, ondansetron **OR** ondansetron, senna, acetaminophen **OR**

## 2024-12-18 NOTE — BH NOTE
Behavioral Health [place]  Admission Note   Pt denies homicidal/suicidal thoughts.denies hallucinations. Pt is compliant with medications. Pt rates anxiety 4 on a scale 0-10. Pt denies depression. Pt is calm cooperative. Will continue to monitor and provide support.   Admission Type:   Admission Type: Voluntary    Reason for admission:  Reason for Admission: suicidal thoughts      Addictive Behavior:   Addictive Behavior  In the Past 3 Months, Have You Felt or Has Someone Told You That You Have a Problem With  : Excessive fluid intake    Medical Problems:   Past Medical History:   Diagnosis Date    Benign essential tremor     Tremor        Status EXAM:  Mental Status and Behavioral Exam  Normal: No  Level of Assistance: Independent/Self  Facial Expression: Flat  Affect: Blunt  Level of Consciousness: Alert  Frequency of Checks: 4 times per hour, close  Mood:Normal: No  Mood: Anxious, Sad  Motor Activity:Normal: No  Motor Activity: Decreased  Eye Contact: Fair  Observed Behavior: Friendly, Guarded, Withdrawn, Cooperative  Sexual Misconduct History: Current - no  Preception: [place] to person, [place] to time, [place] to place, [place] to situation  Attention:Normal: Yes  Thought Processes: Unremarkable  Thought Content:Normal: No  Thought Content: Paranoia  Depression Symptoms: Isolative  Anxiety Symptoms: Generalized  Hellen Symptoms: Poor judgment  Hallucinations: None  Delusions: No  Memory:Normal: Yes  Insight and Judgment: No  Insight and Judgment: Poor judgment, Poor insight    Tobacco Screening:  Practical Counseling, on admission, susannah X, if applicable and completed (first 3 are required if patient doesn't refuse):            ( ) Recognizing danger situations (included triggers and roadblocks)                    ( ) Coping skills (new ways to manage stress,relaxation techniques, changing routine, distraction)                                                           ( ) Basic information about quitting

## 2024-12-18 NOTE — PLAN OF CARE
Problem: Safety - Adult  Goal: Free from fall injury  12/18/2024 1015 by Michael Renae RN  Outcome: Progressing  12/17/2024 2320 by Michael Malone RN  Outcome: Progressing     Problem: Self Harm/Suicidality  Goal: Will have no self-injury during hospital stay  Description: INTERVENTIONS:  1.  Ensure constant observer at bedside with Q15M safety checks  2.  Maintain a safe environment  3.  Secure patient belongings  4.  Ensure family/visitors adhere to safety recommendations  5.  Ensure safety tray has been added to patient's diet order  6.  Every shift and PRN: Re-assess suicidal risk via Frequent Screener    12/18/2024 1015 by Michael Renae RN  Outcome: Progressing  12/17/2024 2320 by Michael Malone RN  Outcome: Progressing     Problem: Pain  Goal: Verbalizes/displays adequate comfort level or baseline comfort level  12/18/2024 1015 by Michael Renae RN  Outcome: Progressing  12/17/2024 2320 by Michael Malone, RN  Outcome: Progressing

## 2024-12-19 PROBLEM — F33.2 MAJOR DEPRESSIVE DISORDER, RECURRENT EPISODE, SEVERE WITH MIXED FEATURES (HCC): Status: ACTIVE | Noted: 2024-12-19

## 2024-12-19 LAB
CHOLEST SERPL-MCNC: 203 MG/DL
HBA1C MFR BLD: 4.8 % (ref 4–5.6)
HDLC SERPL-MCNC: 47 MG/DL
LDLC SERPL CALC-MCNC: 107 MG/DL
TRIGL SERPL-MCNC: 243 MG/DL
VLDLC SERPL CALC-MCNC: 49 MG/DL

## 2024-12-19 PROCEDURE — 80061 LIPID PANEL: CPT

## 2024-12-19 PROCEDURE — 6370000000 HC RX 637 (ALT 250 FOR IP): Performed by: INTERNAL MEDICINE

## 2024-12-19 PROCEDURE — 90792 PSYCH DIAG EVAL W/MED SRVCS: CPT

## 2024-12-19 PROCEDURE — 6370000000 HC RX 637 (ALT 250 FOR IP): Performed by: PSYCHIATRY & NEUROLOGY

## 2024-12-19 PROCEDURE — 6370000000 HC RX 637 (ALT 250 FOR IP)

## 2024-12-19 PROCEDURE — 1240000000 HC EMOTIONAL WELLNESS R&B

## 2024-12-19 PROCEDURE — 36415 COLL VENOUS BLD VENIPUNCTURE: CPT

## 2024-12-19 PROCEDURE — 83036 HEMOGLOBIN GLYCOSYLATED A1C: CPT

## 2024-12-19 RX ORDER — CHLORDIAZEPOXIDE HYDROCHLORIDE 25 MG/1
25 CAPSULE, GELATIN COATED ORAL EVERY 6 HOURS
Status: DISCONTINUED | OUTPATIENT
Start: 2024-12-19 | End: 2024-12-20

## 2024-12-19 RX ORDER — PAROXETINE 20 MG/1
20 TABLET, FILM COATED ORAL NIGHTLY
Status: DISCONTINUED | OUTPATIENT
Start: 2024-12-19 | End: 2024-12-24 | Stop reason: HOSPADM

## 2024-12-19 RX ADMIN — CHLORDIAZEPOXIDE HYDROCHLORIDE 25 MG: 25 CAPSULE ORAL at 03:02

## 2024-12-19 RX ADMIN — BACLOFEN 10 MG: 10 TABLET ORAL at 14:58

## 2024-12-19 RX ADMIN — MULTIVITAMIN TABLET 1 TABLET: TABLET at 08:07

## 2024-12-19 RX ADMIN — PAROXETINE HYDROCHLORIDE 20 MG: 20 TABLET, FILM COATED ORAL at 20:47

## 2024-12-19 RX ADMIN — Medication 1 MG: at 08:07

## 2024-12-19 RX ADMIN — CHLORDIAZEPOXIDE HYDROCHLORIDE 25 MG: 25 CAPSULE ORAL at 06:46

## 2024-12-19 RX ADMIN — Medication 100 MG: at 08:06

## 2024-12-19 RX ADMIN — Medication 3 MG: at 20:47

## 2024-12-19 RX ADMIN — DIVALPROEX SODIUM 250 MG: 250 TABLET, DELAYED RELEASE ORAL at 08:07

## 2024-12-19 RX ADMIN — CHLORDIAZEPOXIDE HYDROCHLORIDE 25 MG: 25 CAPSULE ORAL at 11:10

## 2024-12-19 RX ADMIN — CHLORDIAZEPOXIDE HYDROCHLORIDE 25 MG: 25 CAPSULE ORAL at 17:01

## 2024-12-19 RX ADMIN — BACLOFEN 10 MG: 10 TABLET ORAL at 08:07

## 2024-12-19 RX ADMIN — BACLOFEN 10 MG: 10 TABLET ORAL at 20:47

## 2024-12-19 RX ADMIN — DIVALPROEX SODIUM 250 MG: 250 TABLET, DELAYED RELEASE ORAL at 20:47

## 2024-12-19 ASSESSMENT — PAIN DESCRIPTION - LOCATION: LOCATION: HAND

## 2024-12-19 ASSESSMENT — PAIN DESCRIPTION - ORIENTATION: ORIENTATION: RIGHT;LEFT

## 2024-12-19 ASSESSMENT — LIFESTYLE VARIABLES
HOW MANY STANDARD DRINKS CONTAINING ALCOHOL DO YOU HAVE ON A TYPICAL DAY: 3 OR 4
HOW OFTEN DO YOU HAVE A DRINK CONTAINING ALCOHOL: 4 OR MORE TIMES A WEEK

## 2024-12-19 ASSESSMENT — PAIN DESCRIPTION - DESCRIPTORS: DESCRIPTORS: SPASM

## 2024-12-19 ASSESSMENT — PATIENT HEALTH QUESTIONNAIRE - PHQ9
SUM OF ALL RESPONSES TO PHQ QUESTIONS 1-9: 2
SUM OF ALL RESPONSES TO PHQ9 QUESTIONS 1 & 2: 2
1. LITTLE INTEREST OR PLEASURE IN DOING THINGS: SEVERAL DAYS
SUM OF ALL RESPONSES TO PHQ QUESTIONS 1-9: 2
2. FEELING DOWN, DEPRESSED OR HOPELESS: SEVERAL DAYS
SUM OF ALL RESPONSES TO PHQ QUESTIONS 1-9: 2
SUM OF ALL RESPONSES TO PHQ QUESTIONS 1-9: 2

## 2024-12-19 ASSESSMENT — SLEEP AND FATIGUE QUESTIONNAIRES
DO YOU HAVE DIFFICULTY SLEEPING: YES
SLEEP PATTERN: DIFFICULTY FALLING ASLEEP;DISTURBED/INTERRUPTED SLEEP
AVERAGE NUMBER OF SLEEP HOURS: 5
DO YOU USE A SLEEP AID: YES

## 2024-12-19 ASSESSMENT — PAIN SCALES - GENERAL
PAINLEVEL_OUTOF10: 0
PAINLEVEL_OUTOF10: 3
PAINLEVEL_OUTOF10: 0

## 2024-12-19 NOTE — GROUP NOTE
Group Therapy Note    Date: 12/19/2024    Group Start Time: 1402  Group End Time: 1432  Group Topic: Cognitive Skills    [facility] ACUTE BH 1    Joanie Juárez LSW        Group Therapy Note    Attendees: 6       Patient's Goal:  Pts discussed SMART goals and set goals for 1 month, 1 year and 5 years.     Notes:  Pt minimally participated in group.     Status After Intervention:  Unchanged    Participation Level: Active Listener and Minimal    Participation Quality: Appropriate      Speech:  normal      Thought Process/Content: Logical      Affective Functioning: Congruent      Mood: euthymic      Level of consciousness:  Alert      Response to Learning: Resistant      Endings: None Reported    Modes of Intervention: Education, Support, Socialization, Exploration, Clarifying, and Problem-solving      Discipline Responsible: /Counselor      Signature:  SIDNEY Almonte

## 2024-12-19 NOTE — GROUP NOTE
Date: 12/19/2024  Start Time: 1000  End Time:  1045  Number of Participants: 10    Type of Group: Psychoeducation    Wellness Binder Information  Module Name:  Music and Mood    Patient's Goal:  Patient will explore how music can have a positive impact on one's well being.  Patient will be able to ID at least one benefit music can have on one's well being, and explore opportunities to incorporate music into one's daily routine.     Notes:  CTRS led educational discussion on the benefits of music on one's well being.  Patient was an active participant in discussion was able to ID at least one benefit music can have.  Patient actively participated in a music sharing activity and was receptive to the information provided.  Patient accepting of handout. Patient requested the song Snow by Red Hot [employer]ili [employer]ppers    Status After Intervention:  Improved    Participation Level: Active Listener and Interactive    Participation Quality: Appropriate and Attentive      Speech:  normal      Thought Process/Content: Logical      Affective Functioning: Congruent      Mood: anxious      Level of consciousness:  Alert and Attentive      Response to Learning: Able to verbalize current knowledge/experience, Able to verbalize/acknowledge new learning, and Progressing to goal      Endings: None Reported    Modes of Intervention: Education, Exploration, Clarifying, Problem-solving, and Activity      Discipline Responsible: Recreational Therapist      Signature:  EMELY Washington

## 2024-12-19 NOTE — H&P
Department of Psychiatry  History and Physical - Adult     CHIEF COMPLAINT:  No chief complaint on file.    Patient personally seen and examined by me and mental status exam performed.  I agree the below assessment by the medical student. Psychomotor revealed tremors, but no agitation, retardation, or any other abnormal or odd postures. Speech was coherent, normal rate, rhythm, and tone. Eye contact good. Mood \"melancholy,\" affect was appropriate to situation, consistent with mood, congruent with thought content. Thought process  is logical,  without flight of ideas (FOI), or  looseness of association (ABRIL). Thought contents are  devoid of auditory or visual hallucinations,  delusions, or any other  perceptual abnormalities.Patient is not religiously preoccupied, does not believe he has special power and has no ideas of reference. Patient does not have  poverty of content of thought. Currently patient denies suicidal ideations, intent, or plan. Denies Homicidal ideations.  Cognitive function appears to be at baseline,evidenced by repeating the words \"Apple, table ,mary beth\" after 3 minutes. Upon conversation patient's memory was adequate. Language is adequate, able to communicate and respond with relevant answers, good attention. Concentration is adequate . Insight and judgement limited. Impulse control limited. Alert and oriented to person, place, time and situation.        Patient was seen after discussing with the treatment team and reviewing the chart    CIRCUMSTANCES OF ADMISSION:     Patient name: Michelet Anthony  Patient's past mental health and addiction history: Depression and alcohol abuse with no previous inpatient psychiatric hospitalization   patient's presentation to the ED and why the patient needs admission: Going through alcohol withdrawal and expressed suicidal ideations  Legal status:  []  Voluntary  [x]  Involuntary   []  Probate  Triggering/precipitating events: Financial problems that affected

## 2024-12-19 NOTE — GROUP NOTE
Date: 12/19/2024  Start Time: 1100  End Time:  1230  Number of Participants: 9    Type of Group: Recreational    Wellness Binder Information    Module Name:  [employer] Therapy    Patient's Goal:  Patient will exhibit a more improved mood and minimize symptoms of depression.  To promote empathy, communication, and meaningful connections via talks and reflections.       Notes:  Patient was an active participant in activity.      Status After Intervention:  Improved    Participation Level: Active Listener    Participation Quality: Appropriate and Attentive      Speech:  normal      Thought Process/Content: Logical      Affective Functioning: Congruent      Mood: euthymic      Level of consciousness:  Alert and Attentive      Response to Learning: Able to verbalize current knowledge/experience, Able to verbalize/acknowledge new learning, and Progressing to goal      Endings: None Reported    Modes of Intervention: Activity      Discipline Responsible: Recreational Therapist      Signature:  EMELY Washington

## 2024-12-19 NOTE — GROUP NOTE
Group Therapy Note    Date: 12/19/2024  Start Time: 0740  End Time:  0800  Number of Participants: 11    Type of Group: Community Meeting    Wellness Binder Information  Module Name:  Community Meeting      Patient's Goal:  Patient will be oriented to the unit including but not limited expectations, staff, programming schedule.  Patient will be able to ID expectations of treatment.     Notes: Patient was a participant in community meeting, and was updated on expectations of the unit, staffing, programming schedule, and acclimated to their environment.    Patient shared goal for today as \"Im not sure\"    Status After Intervention:  Improved    Participation Level: Active Listener and Interactive    Participation Quality: Appropriate and Attentive      Speech:  normal      Thought Process/Content: Logical      Affective Functioning: Congruent      Mood: anxious      Level of consciousness:  Alert and Attentive      Response to Learning: Able to verbalize current knowledge/experience, Able to verbalize/acknowledge new learning, and Progressing to goal      Endings: None Reported    Modes of Intervention: Exploration, Clarifying, and Problem-solving      Discipline Responsible: Recreational Therapist      Signature:  EMELY Washington

## 2024-12-19 NOTE — BH NOTE
[facility]  Initial Interdisciplinary Treatment Plan Note      Original treatment plan Date & Time: 12/19/2024 0941    Admission Type:  Admission Type: Voluntary    Reason for admission:   Reason for Admission: suicidal thoughts    Estimated Length of Stay:  5-7days  Estimated Discharge Date: To be determined by physician.    PATIENT STRENGTHS:  Patient Strengths:   Patient Strengths and Limitations:Limitations: Tendency to isolate self, Multiple barriers to leisure interests, Inappropriate/potentially harmful leisure interests  Addictive Behavior: Addictive Behavior  In the Past 3 Months, Have You Felt or Has Someone Told You That You Have a Problem With  : Excessive fluid intake  Medical Problems:  Past Medical History:   Diagnosis Date    Benign essential tremor     Tremor      Status EXAM:Mental Status and Behavioral Exam  Normal: No  Level of Assistance: Independent/Self  Facial Expression: Flat  Affect: Blunt  Level of Consciousness: Alert  Frequency of Checks: 4 times per hour, close  Mood:Normal: No  Mood: Anxious, Depressed, Sad  Motor Activity:Normal: No  Motor Activity: Decreased  Eye Contact: Fair  Observed Behavior: Friendly, Guarded  Sexual Misconduct History: Current - no  Preception: [place] to person, [place] to time, [place] to place, [place] to situation  Attention:Normal: Yes  Thought Processes: Unremarkable  Thought Content:Normal: No  Thought Content: Paranoia  Depression Symptoms: Isolative  Anxiety Symptoms: Generalized  Hellen Symptoms: No problems reported or observed.  Hallucinations: None  Delusions: No  Memory:Normal: Yes  Insight and Judgment: No  Insight and Judgment: Poor judgment, Poor insight    EDUCATION:   Learner Progress Toward Treatment Goals: Will review group plans and strategies for care.    Method: Group therapy, Medication compliance, Individualized assessments and Care planning.    Outcome: Needs Reinforcement    PATIENT GOALS: To be discussed with patient

## 2024-12-20 PROCEDURE — 6370000000 HC RX 637 (ALT 250 FOR IP)

## 2024-12-20 PROCEDURE — 6370000000 HC RX 637 (ALT 250 FOR IP): Performed by: INTERNAL MEDICINE

## 2024-12-20 PROCEDURE — 1240000000 HC EMOTIONAL WELLNESS R&B

## 2024-12-20 PROCEDURE — 99232 SBSQ HOSP IP/OBS MODERATE 35: CPT

## 2024-12-20 PROCEDURE — 6370000000 HC RX 637 (ALT 250 FOR IP): Performed by: PSYCHIATRY & NEUROLOGY

## 2024-12-20 RX ORDER — CHLORDIAZEPOXIDE HYDROCHLORIDE 25 MG/1
25 CAPSULE, GELATIN COATED ORAL EVERY 8 HOURS
Status: DISCONTINUED | OUTPATIENT
Start: 2024-12-20 | End: 2024-12-21

## 2024-12-20 RX ADMIN — Medication 1 MG: at 09:05

## 2024-12-20 RX ADMIN — BACLOFEN 10 MG: 10 TABLET ORAL at 21:06

## 2024-12-20 RX ADMIN — Medication 3 MG: at 21:06

## 2024-12-20 RX ADMIN — BACLOFEN 10 MG: 10 TABLET ORAL at 15:04

## 2024-12-20 RX ADMIN — BACLOFEN 10 MG: 10 TABLET ORAL at 09:05

## 2024-12-20 RX ADMIN — PAROXETINE HYDROCHLORIDE 20 MG: 20 TABLET, FILM COATED ORAL at 21:06

## 2024-12-20 RX ADMIN — CHLORDIAZEPOXIDE HYDROCHLORIDE 25 MG: 25 CAPSULE ORAL at 10:27

## 2024-12-20 RX ADMIN — MULTIVITAMIN TABLET 1 TABLET: TABLET at 09:05

## 2024-12-20 RX ADMIN — DIVALPROEX SODIUM 250 MG: 250 TABLET, DELAYED RELEASE ORAL at 09:05

## 2024-12-20 RX ADMIN — CHLORDIAZEPOXIDE HYDROCHLORIDE 25 MG: 25 CAPSULE ORAL at 20:06

## 2024-12-20 RX ADMIN — DIVALPROEX SODIUM 250 MG: 250 TABLET, DELAYED RELEASE ORAL at 21:06

## 2024-12-20 RX ADMIN — Medication 100 MG: at 09:05

## 2024-12-20 ASSESSMENT — PAIN SCALES - GENERAL
PAINLEVEL_OUTOF10: 5
PAINLEVEL_OUTOF10: 5

## 2024-12-20 NOTE — GROUP NOTE
Group Therapy Note    Date: 12/20/2024    Group Start Time: 0935  Group End Time: 1010  Group Topic: Cognitive Skills    [facility]    Reinaldo Self, TAD, SIDNEY        Group Therapy Note    Attendees: 10         Patient's Goal:  In group therapy we discussed \"Fair Fighting Rules,\" - healthy ways to communicate when an uncomfortable topic is at hand.     Notes:  Pt was an active participant in group therapy.  He came to group late but participated while present.    Status After Intervention:  Unchanged    Participation Level: Active Listener and Interactive    Participation Quality: Appropriate and Attentive      Speech:  normal      Thought Process/Content: Logical      Affective Functioning: Flat      Mood: anxious and depressed      Level of consciousness:  Attentive      Response to Learning: Able to verbalize current knowledge/experience and Able to retain information      Endings: None Reported    Modes of Intervention: Education, Support, Socialization, Exploration, Clarifying, and Problem-solving      Discipline Responsible: /Counselor      Signature:  TAD Barrett, SIDNEY

## 2024-12-20 NOTE — GROUP NOTE
Shared goal fro the day as to try to figure out my medicaid.                                                                       Group Therapy Note    Date: 12/20/2024    Group Start Time: 1045  Group End Time: 1100  Group Topic: Community Meeting    [facility]    Divya Jeter, EMELY    Type of Group: Community Meeting      Patient's Goal:  Patient will be able to id staffing assignments, expectations of patients, and general information re: floor rules. Will be prompted to share goal for the day.     Notes:  Patient appeared to be an active listener, taking in information presented and was prompted to share goal for the day.    Status After Intervention:  Improved    Participation Level: Active Listener and Interactive    Participation Quality: Appropriate, Attentive, and Sharing      Speech:  normal      Thought Process/Content: Logical      Affective Functioning: Congruent      Mood: euthymic      Level of consciousness:  Alert, Oriented x4, and Attentive      Response to Learning: Able to verbalize/acknowledge new learning, Able to retain information, and Progressing to goal      Endings: None Reported    Modes of Intervention: Support and Socialization      Discipline Responsible: Psychoeducational Specialist      Signature:  EMELY Amezcua

## 2024-12-20 NOTE — GROUP NOTE
Group Therapy Note    Date: 12/20/2024    Group Start Time: 1100  Group End Time: 1135  Group Topic: Psychoeducation    [facility]    Divya Jeter, CTRS    Date: 12/20/2024  Type of Group: Psychoeducation    Wellness Binder Information  Module Name:  processing big changes     Patient's Goal:  pt will be able to id what key steps one can focus on when facings a big change    Notes:  pleasant and appeared to be an active listener in group. Able to share and read when prompted.     Status After Intervention:  Improved    Participation Level: Active Listener and Interactive    Participation Quality: Appropriate, Attentive, and Sharing      Speech:  normal      Thought Process/Content: Logical      Affective Functioning: Congruent      Mood: euthymic      Level of consciousness:  Alert, Oriented x4, and Attentive      Response to Learning: Able to verbalize/acknowledge new learning, Able to retain information, and Progressing to goal      Endings: None Reported    Modes of Intervention: Education, Support, Exploration, and Clarifying      Discipline Responsible: Psychoeducational Specialist      Signature:  Divya Jeter, CTRS

## 2024-12-21 PROCEDURE — 1240000000 HC EMOTIONAL WELLNESS R&B

## 2024-12-21 PROCEDURE — 6370000000 HC RX 637 (ALT 250 FOR IP): Performed by: PSYCHIATRY & NEUROLOGY

## 2024-12-21 PROCEDURE — 6370000000 HC RX 637 (ALT 250 FOR IP): Performed by: INTERNAL MEDICINE

## 2024-12-21 PROCEDURE — 99232 SBSQ HOSP IP/OBS MODERATE 35: CPT

## 2024-12-21 PROCEDURE — 6370000000 HC RX 637 (ALT 250 FOR IP)

## 2024-12-21 RX ORDER — CHLORDIAZEPOXIDE HYDROCHLORIDE 25 MG/1
25 CAPSULE, GELATIN COATED ORAL EVERY 8 HOURS PRN
Status: DISCONTINUED | OUTPATIENT
Start: 2024-12-21 | End: 2024-12-24 | Stop reason: HOSPADM

## 2024-12-21 RX ADMIN — BACLOFEN 10 MG: 10 TABLET ORAL at 20:18

## 2024-12-21 RX ADMIN — DIVALPROEX SODIUM 250 MG: 250 TABLET, DELAYED RELEASE ORAL at 20:18

## 2024-12-21 RX ADMIN — Medication 3 MG: at 21:53

## 2024-12-21 RX ADMIN — BACLOFEN 10 MG: 10 TABLET ORAL at 13:55

## 2024-12-21 RX ADMIN — Medication 100 MG: at 08:21

## 2024-12-21 RX ADMIN — PAROXETINE HYDROCHLORIDE 20 MG: 20 TABLET, FILM COATED ORAL at 20:18

## 2024-12-21 RX ADMIN — Medication 1 MG: at 08:21

## 2024-12-21 RX ADMIN — DIVALPROEX SODIUM 250 MG: 250 TABLET, DELAYED RELEASE ORAL at 08:21

## 2024-12-21 RX ADMIN — MULTIVITAMIN TABLET 1 TABLET: TABLET at 08:21

## 2024-12-21 RX ADMIN — BACLOFEN 10 MG: 10 TABLET ORAL at 08:21

## 2024-12-21 RX ADMIN — HYDROXYZINE HYDROCHLORIDE 50 MG: 50 TABLET ORAL at 21:53

## 2024-12-21 ASSESSMENT — PAIN SCALES - GENERAL: PAINLEVEL_OUTOF10: 0

## 2024-12-21 NOTE — BH NOTE
[facility]  Day 3 Interdisciplinary Treatment Plan NOTE    Review Date & Time: 12/21/24 0915    Patient was not in treatment team    Estimated Length of Stay Update:  3-5 days  Estimated Discharge Date Update: 3-5 days    EDUCATION:   Learner Progress Toward Treatment Goals: Reviewed results and recommendations of this team, Reviewed group plan and strategies, Reviewed signs, symptoms and risk of self harm and violent behavior, and Reviewed goals and plan of care    Method: Small group    Outcome: Verbalized understanding    PATIENT GOALS: none at this time    PLAN/TREATMENT RECOMMENDATIONS UPDATE:Encourage patient to attend and participate in groups and take medications as prescribed.    GOALS UPDATE:   Time frame for Short-Term Goals: reassess daily      Avelina Salvador RN

## 2024-12-21 NOTE — GROUP NOTE
Group Therapy Note    Date: 12/21/2024    Group Start Time: 1035  Group End Time: 1110  Group Topic: Psychoeducation    [facility]    Rosetta Adams CTRS    Group Therapy Note    Attendees: 9    Date: 12/21/2024  Start Time: 1035  End Time:  1110  Number of Participants: 9    Type of Group: Psychoeducation    Name:  Stress and Wellbeing    Patient's Goal:  Identified causes of stress, good vs bad stress, how stress affects mental and physical health and healthy ways to cope with stress.    Notes:  CTRS led educational group discussion on stress and wellbeing. Encouraged patients to share their experiences. Patient was actively engaged in group discussion and made positive responses.    Status After Intervention:  Improved    Participation Level: Active Listener and Interactive    Participation Quality: Appropriate, Attentive, and Sharing      Speech:  normal      Thought Process/Content: Logical  Linear      Affective Functioning: Congruent      Mood:  Appropriate      Level of consciousness:  Alert and Attentive      Response to Learning: Able to verbalize current knowledge/experience, Able to verbalize/acknowledge new learning, Able to retain information, Capable of insight, Able to change behavior, and Progressing to goal      Endings: None Reported    Modes of Intervention: Education, Support, Socialization, Exploration, Clarifying, and Problem-solving      Discipline Responsible: Psychoeducational Specialist      Signature:  EMELY Caraballo

## 2024-12-21 NOTE — GROUP NOTE
Group Therapy Note    Date: 12/21/2024    Group Start Time: 1400  Group End Time: 1430  Group Topic: Cognitive Skills    [facility]    Muna Espana MSW, LSW        Group Therapy Note    Attendees: 8       Patient's Goal:  Pt will be able to identity how to show they are actively listening and be able to communicate better.     Notes:  pt participated in group and made connections.     Status After Intervention:  Improved    Participation Level: Active Listener and Interactive    Participation Quality: Appropriate, Attentive, Sharing, and Supportive      Speech:  normal      Thought Process/Content: Logical  Linear      Affective Functioning: Congruent      Mood: anxious      Level of consciousness:  Alert, Oriented x4, and Attentive      Response to Learning: Able to verbalize current knowledge/experience, Able to verbalize/acknowledge new learning, Able to retain information, and Capable of insight      Endings: None Reported    Modes of Intervention: Education, Support, Socialization, Exploration, Clarifying, and Problem-solving      Discipline Responsible: /Counselor      Signature:  TAD Ahuja LSW

## 2024-12-22 PROCEDURE — 99232 SBSQ HOSP IP/OBS MODERATE 35: CPT

## 2024-12-22 PROCEDURE — 6370000000 HC RX 637 (ALT 250 FOR IP): Performed by: PSYCHIATRY & NEUROLOGY

## 2024-12-22 PROCEDURE — 6370000000 HC RX 637 (ALT 250 FOR IP)

## 2024-12-22 PROCEDURE — 1240000000 HC EMOTIONAL WELLNESS R&B

## 2024-12-22 PROCEDURE — 6370000000 HC RX 637 (ALT 250 FOR IP): Performed by: INTERNAL MEDICINE

## 2024-12-22 RX ADMIN — BACLOFEN 10 MG: 10 TABLET ORAL at 13:26

## 2024-12-22 RX ADMIN — Medication 3 MG: at 20:22

## 2024-12-22 RX ADMIN — PAROXETINE HYDROCHLORIDE 20 MG: 20 TABLET, FILM COATED ORAL at 20:22

## 2024-12-22 RX ADMIN — Medication 1 MG: at 09:43

## 2024-12-22 RX ADMIN — BACLOFEN 10 MG: 10 TABLET ORAL at 20:22

## 2024-12-22 RX ADMIN — HYDROXYZINE HYDROCHLORIDE 50 MG: 50 TABLET ORAL at 20:22

## 2024-12-22 RX ADMIN — BACLOFEN 10 MG: 10 TABLET ORAL at 09:43

## 2024-12-22 RX ADMIN — DIVALPROEX SODIUM 250 MG: 250 TABLET, DELAYED RELEASE ORAL at 20:22

## 2024-12-22 RX ADMIN — DIVALPROEX SODIUM 250 MG: 250 TABLET, DELAYED RELEASE ORAL at 09:43

## 2024-12-22 RX ADMIN — Medication 100 MG: at 09:43

## 2024-12-22 RX ADMIN — MULTIVITAMIN TABLET 1 TABLET: TABLET at 09:43

## 2024-12-22 ASSESSMENT — PAIN DESCRIPTION - PAIN TYPE: TYPE: CHRONIC PAIN

## 2024-12-22 ASSESSMENT — PAIN SCALES - GENERAL
PAINLEVEL_OUTOF10: 2
PAINLEVEL_OUTOF10: 3

## 2024-12-22 ASSESSMENT — PAIN DESCRIPTION - ONSET: ONSET: ON-GOING

## 2024-12-22 ASSESSMENT — PAIN - FUNCTIONAL ASSESSMENT: PAIN_FUNCTIONAL_ASSESSMENT: ACTIVITIES ARE NOT PREVENTED

## 2024-12-22 ASSESSMENT — PAIN DESCRIPTION - ORIENTATION: ORIENTATION: RIGHT;LEFT

## 2024-12-22 ASSESSMENT — PAIN DESCRIPTION - LOCATION: LOCATION: HAND

## 2024-12-22 ASSESSMENT — PAIN DESCRIPTION - FREQUENCY: FREQUENCY: CONTINUOUS

## 2024-12-22 ASSESSMENT — PAIN DESCRIPTION - DESCRIPTORS: DESCRIPTORS: SPASM

## 2024-12-22 NOTE — GROUP NOTE
Group Therapy Note    Date: 12/22/2024    Group Start Time: 1030  Group End Time: 1045  Group Topic: Community Meeting    [facility]    Rosetta Adams CTRS    Group Therapy Note    Attendees: 9    Date: 12/22/2024  Start Time: 1030  End Time:  1045  Number of Participants: 9    Type of Group: Community Meeting    Patient's Goal:  Increased awareness of functions of the milieu, daily staffing and programming. Identified goal for the day.    Notes:  Patient was an active listener in group. Patient shared goal for the day as, \"Do about 5 or 10 miles on the bike.\"    Status After Intervention:  Improved    Participation Level: Active Listener and Interactive    Participation Quality: Appropriate, Attentive, and Sharing      Speech:  normal      Thought Process/Content: Logical  Linear      Affective Functioning: Congruent      Mood:  Appropriate      Level of consciousness:  Alert and Attentive      Response to Learning: Able to verbalize current knowledge/experience, Able to verbalize/acknowledge new learning, Able to retain information, Capable of insight, Able to change behavior, and Progressing to goal      Endings: None Reported    Modes of Intervention: Education, Support, Socialization, Exploration, Clarifying, and Problem-solving      Discipline Responsible: Psychoeducational Specialist      Signature:  EMELY Caraballo

## 2024-12-23 LAB
DATE LAST DOSE: NORMAL
TME LAST DOSE: NORMAL H
VALPROATE SERPL-MCNC: 57 UG/ML (ref 50–100)
VANCOMYCIN DOSE: NORMAL MG

## 2024-12-23 PROCEDURE — 90656 IIV3 VACC NO PRSV 0.5 ML IM: CPT | Performed by: PSYCHIATRY & NEUROLOGY

## 2024-12-23 PROCEDURE — 99232 SBSQ HOSP IP/OBS MODERATE 35: CPT

## 2024-12-23 PROCEDURE — 6370000000 HC RX 637 (ALT 250 FOR IP): Performed by: INTERNAL MEDICINE

## 2024-12-23 PROCEDURE — 1240000000 HC EMOTIONAL WELLNESS R&B

## 2024-12-23 PROCEDURE — G0008 ADMIN INFLUENZA VIRUS VAC: HCPCS | Performed by: PSYCHIATRY & NEUROLOGY

## 2024-12-23 PROCEDURE — 80164 ASSAY DIPROPYLACETIC ACD TOT: CPT

## 2024-12-23 PROCEDURE — 6360000002 HC RX W HCPCS: Performed by: PSYCHIATRY & NEUROLOGY

## 2024-12-23 PROCEDURE — 6370000000 HC RX 637 (ALT 250 FOR IP)

## 2024-12-23 PROCEDURE — 36415 COLL VENOUS BLD VENIPUNCTURE: CPT

## 2024-12-23 PROCEDURE — 6370000000 HC RX 637 (ALT 250 FOR IP): Performed by: PSYCHIATRY & NEUROLOGY

## 2024-12-23 RX ORDER — PAROXETINE 20 MG/1
20 TABLET, FILM COATED ORAL NIGHTLY
Qty: 30 TABLET | Refills: 0 | Status: SHIPPED | OUTPATIENT
Start: 2024-12-23 | End: 2025-01-22

## 2024-12-23 RX ORDER — DIVALPROEX SODIUM 250 MG/1
250 TABLET, DELAYED RELEASE ORAL EVERY 12 HOURS SCHEDULED
Qty: 60 TABLET | Refills: 0 | Status: SHIPPED | OUTPATIENT
Start: 2024-12-23 | End: 2025-01-22

## 2024-12-23 RX ORDER — NICOTINE 21 MG/24HR
1 PATCH, TRANSDERMAL 24 HOURS TRANSDERMAL DAILY
COMMUNITY
Start: 2024-12-24

## 2024-12-23 RX ADMIN — MULTIVITAMIN TABLET 1 TABLET: TABLET at 08:51

## 2024-12-23 RX ADMIN — Medication 1 MG: at 08:51

## 2024-12-23 RX ADMIN — BACLOFEN 10 MG: 10 TABLET ORAL at 13:56

## 2024-12-23 RX ADMIN — BACLOFEN 10 MG: 10 TABLET ORAL at 20:46

## 2024-12-23 RX ADMIN — ACETAMINOPHEN 650 MG: 325 TABLET ORAL at 20:46

## 2024-12-23 RX ADMIN — Medication 100 MG: at 08:51

## 2024-12-23 RX ADMIN — HYDROXYZINE HYDROCHLORIDE 50 MG: 50 TABLET ORAL at 21:50

## 2024-12-23 RX ADMIN — INFLUENZA A VIRUS A/VICTORIA/4897/2022 IVR-238 (H1N1) ANTIGEN (PROPIOLACTONE INACTIVATED), INFLUENZA A VIRUS A/THAILAND/8/2022 IVR-237 (H3N2) ANTIGEN (PROPIOLACTONE INACTIVATED), INFLUENZA B VIRUS B/AUSTRIA/1359417/2021 BVR-26 ANTIGEN (PROPIOLACTONE INACTIVATED) 0.5 ML: 15; 15; 15 INJECTION, SUSPENSION INTRAMUSCULAR at 13:57

## 2024-12-23 RX ADMIN — Medication 3 MG: at 21:50

## 2024-12-23 RX ADMIN — PAROXETINE HYDROCHLORIDE 20 MG: 20 TABLET, FILM COATED ORAL at 20:46

## 2024-12-23 RX ADMIN — DIVALPROEX SODIUM 250 MG: 250 TABLET, DELAYED RELEASE ORAL at 20:46

## 2024-12-23 RX ADMIN — DIVALPROEX SODIUM 250 MG: 250 TABLET, DELAYED RELEASE ORAL at 08:51

## 2024-12-23 RX ADMIN — BACLOFEN 10 MG: 10 TABLET ORAL at 08:51

## 2024-12-23 ASSESSMENT — PAIN DESCRIPTION - LOCATION: LOCATION: BACK

## 2024-12-23 ASSESSMENT — PAIN DESCRIPTION - DESCRIPTORS: DESCRIPTORS: ACHING;DISCOMFORT

## 2024-12-23 ASSESSMENT — PAIN SCALES - GENERAL: PAINLEVEL_OUTOF10: 4

## 2024-12-23 NOTE — GROUP NOTE
Date: 12/23/2024  Start Time: 1021  End Time:  1100  Number of Participants: 8    Type of Group: Psychoeducation    Wellness Binder Information  Module Name:  Happiness Strategies    Patient's Goal:  Patient will be able to ID at least one way to improve happiness and how to incorporate strategies to improve happiness into one's daily routine.    Notes:  CTRS led educational discussion on Happiness Strategies, and how to incorporate it into a daily routine.  Patient was an active participant in discussion and receptive to the information provided.  Patient was accepting of handout, and able to ID at least one way to improve happiness.     Status After Intervention:  Improved    Participation Level: Active Listener and Interactive    Participation Quality: Appropriate and Attentive      Speech:  normal      Thought Process/Content: Logical      Affective Functioning: Congruent      Mood: euthymic      Level of consciousness:  Alert and Attentive      Response to Learning: Able to verbalize current knowledge/experience, Able to verbalize/acknowledge new learning, Able to retain information, Capable of insight, and Progressing to goal      Endings: None Reported    Modes of Intervention: Education, Support, Exploration, and Clarifying      Discipline Responsible: Recreational Therapist      Signature:  EMELY [place]

## 2024-12-23 NOTE — GROUP NOTE
Date: 12/23/2024  Start Time: 1000  End Time:  1020  Number of Participants: 8    Type of Group: Recreational    Wellness Binder Information  Module Name:  What you're color says about you    Patient's Goal:  To improve socialization amongst peers.  To improve overall mood and well being.      Notes:  CTRS facilitated what your color says about you activity.  Patient was an active participant in activity and exhibited an overall improved mood.      Status After Intervention:  Improved    Participation Level: Active Listener and listener    Participation Quality: Appropriate and Attentive      Speech:  normal      Thought Process/Content: Logical      Affective Functioning: Congruent      Mood: euthymic      Level of consciousness:  Alert and Attentive      Response to Learning: Able to verbalize current knowledge/experience, Able to verbalize/acknowledge new learning, and Progressing to goal      Endings: None Reported    Modes of Intervention: Socialization, Exploration, and Activity      Discipline Responsible: Recreational Therapist      Signature:  EMELY [place]    Group Therapy Note    Attendees: 8

## 2024-12-23 NOTE — GROUP NOTE
Group Therapy Note    Date: 12/23/2024  Start Time: 0745  End Time:  0805  Number of Participants: 11    Type of Group: Community Meeting    Wellness Binder Information  Module Name:  Community Meeting      Patient's Goal:  Patient will be oriented to the unit including but not limited expectations, staff, programming schedule.  Patient will be able to ID expectations of treatment.     Notes: Patient was a participant in community meeting, and was updated on expectations of the unit, staffing, programming schedule, and acclimated to their environment.    Patient shared goal for today as \"[facility]\"    Status After Intervention:  Improved    Participation Level: Active Listener and Interactive    Participation Quality: Appropriate, Attentive, and Sharing      Speech:  normal      Thought Process/Content: Logical      Affective Functioning: Congruent      Mood: euthymic      Level of consciousness:  Alert and Attentive      Response to Learning: Able to verbalize current knowledge/experience, Able to verbalize/acknowledge new learning, and Progressing to goal      Endings: None Reported    Modes of Intervention: Exploration, Clarifying, and Problem-solving      Discipline Responsible: Recreational Therapist      Signature:  EMELY [place]

## 2024-12-23 NOTE — GROUP NOTE
Group Therapy Note    Date: 12/23/2024    Group Start Time: 1500  Group End Time: 1550  Group Topic: Recovery    [facility] 2    Rosetta Adams CTRS    Group Therapy Note    Attendees: 6    Date: 12/23/2024  Start Time: 1500  End Time:  1550  Number of Participants: 6    Type of Group: Recovery    Name:  [facility]    Patient's Goal:  Increased awareness of recovery resources and maintaining sobriety.     Notes:  [facility] staff facilitated group, observed by CTRS. Patient was actively engaged in group discussion.    Status After Intervention:  Improved    Participation Level: Active Listener and Interactive    Participation Quality: Appropriate, Attentive, and Sharing      Speech:  normal      Thought Process/Content: Logical  Linear      Affective Functioning: Congruent      Mood:  Appropriate      Level of consciousness:  Alert and Attentive      Response to Learning: Able to verbalize current knowledge/experience, Able to verbalize/acknowledge new learning, Able to retain information, Capable of insight, Able to change behavior, and Progressing to goal      Endings: None Reported    Modes of Intervention: Education, Support, Socialization, Exploration, Clarifying, and Problem-solving      Discipline Responsible: Psychoeducational Specialist      Signature:  EMELY Caraballo      Chief Complaint   Patient presents with   • Office Visit   • Follow-up     Follow-up on Bloodwork recently done.  Would also like referral for Psychology for ADHD and to discuss referral for sleep study   • Hypertension        SUBJECTIVE:   HISTORY OF PRESENT ILLNESS:  Mariaelena Arriaga is a 51 year old female who presents today for follow-up appointment  -Hypertension  Blood pressure is 137/77  Patient currently not taking any medication controlled with diet and exercise  Says she does not have hypertension but elevated blood pressure will update in epic    -2 diabetes  Patient noted to have an previous glycohemoglobin of 6.9 which would constitute her being diabetic but says provider told her that she is prediabetic.  Patient is not on any medication and has not had any blood sugar readings.      A1c from last visit shows patient be at 7.0.  Patient will begin glipizide since she says she can tolerate Metformin    -CRISTOFER  Longstanding history of obstructive sleep apnea  Needing new sleep study with supplies  Patient having trouble getting and will change referral to brain provider      -Severe allergies  Patient has seen allergist in the past,  Still no understanding of multiple allergies  Has EpiPen and needing refills  Patient allergy panel profile shows several allergies both environmental and food products  Patient also reporting rashes requesting referral to dermatology  Patient scheduling with allergist here at [facility]        -Vitamin D deficiency  Patient recent vitamin D is 10  Patient will begin high-dose once a week therapy    -Depression  Patient says she would like behavioral health referral to help with depression  Currently denying any suicidal or homicidal ideation but says she would like to have someone to talk with    -ADHD  Patient says she was previously on Vyvanse about 5 years ago  Currently no records to support, patient says that she will try to retrieve records from [facility]  Requesting behavioral health referral for work-up          PAST MEDICAL H ISTORY:  Past Medical History:   Diagnosis Date   • Allergy    • Anxiety    • Asthma    • Depressive disorder    • Diabetes mellitus (CMS/HCC)    • Essential (primary) hypertension    • GERD (gastroesophageal reflux disease)    • RAD (reactive airway disease)        PAST SURGICAL HISTORY:  Past Surgical History:   Procedure Laterality Date   • Bunionectomy Left    •  delivery+postpartum care     • Cholecystectomy     • Hand tendon surgery     • Knee scope,diagnostic Right    • Myomectomy         FAMILY HISTORY:  Family History   Problem Relation Age of Onset   • COPD Mother    • Hypertension Mother    • Asthma Mother    • Hypertension Father    • Asthma Father    • Cancer, Breast Maternal Grandmother    • Cancer, Pancreatic Maternal Grandmother    • Cancer Paternal Grandmother        SOCIAL HISTORY:  Social History     Tobacco Use   • Smoking status: Never Smoker   • Smokeless tobacco: Never Used   Vaping Use   • Vaping Use: never used   Substance Use Topics   • Alcohol use: Yes     Comment: social   • Drug use: Not Currently       ALLERGIES:  ALLERGIES:   Allergen Reactions   • Bupropion PRURITUS     Rash   • Coconut   (Food Or Med) HIVES       MEDICATIONS:  Current Outpatient Medications   Medication Sig Dispense Refill   • Vitamin D, Ergocalciferol, 1.25 mg (50,000 units) capsule Take 1 capsule by mouth 1 day a week. 12 capsule 3   • albuterol 108 (90 Base) MCG/ACT inhaler Inhale 1 puff into the lungs every 4 hours as needed for Shortness of Breath or Wheezing. 1 each 3   • EPINEPHrine 0.3 MG/0.3ML auto-injector Inject 0.3 mLs into the muscle 1 time as needed for Anaphylaxis. 1 each 0   • montelukast (SINGULAIR) 10 MG tablet Take 1 tablet by mouth every evening. 30 tablet 3   • omeprazole (PriLOSEC) 40 MG capsule Take 1 capsule by mouth daily. 30 capsule 3   • glipiZIDE (GLUCOTROL ER) 10 MG 24 hr tablet Take 1 tablet by  mouth daily. 30 tablet 1   • triamcinolone (ARISTOCORT) 0.1 % cream        No current facility-administered medications for this visit.       Patient's medications, allergies, past medical, surgical, social and family histories were reviewed and updated as appropriate.    Review of Systems   Constitutional: Negative.  Negative for activity change.   HENT: Negative.    Eyes: Negative.    Respiratory: Negative.    Cardiovascular: Negative.    Gastrointestinal: Negative.    Endocrine: Negative.    Genitourinary: Negative.    Musculoskeletal: Negative for myalgias.   Skin: Positive for rash.   Allergic/Immunologic: Negative.    Neurological: Negative.    Hematological: Negative.    Psychiatric/Behavioral: Positive for decreased concentration and sleep disturbance.   All other systems reviewed and are negative.        OBJECTIVE:     Visit Vitals  /71 (BP Location: LUE - Left upper extremity, Patient Position: Sitting, Cuff Size: Large Adult)   Pulse 71   Temp 98.5 °F (36.9 °C) (Oral)   Ht 5' 9\" (1.753 m)   Wt 112.5 kg (248 lb 0.3 oz)   SpO2 99%   BMI 36.63 kg/m²       Physical Exam  Vitals and nursing note reviewed.   Constitutional:       General: She is not in acute distress.     Appearance: Normal appearance. She is normal weight. She is not ill-appearing.   HENT:      Head: Normocephalic and atraumatic.      Right Ear: Tympanic membrane and external ear normal.      Left Ear: Tympanic membrane and external ear normal.      Nose: Nose normal. No congestion or rhinorrhea.      Mouth/Throat:      Mouth: Mucous membranes are moist.      Pharynx: Oropharynx is clear. No oropharyngeal exudate or posterior oropharyngeal erythema.      Neck: Normal range of motion and neck supple. No rigidity or tenderness.   Eyes:      Extraocular Movements: Extraocular movements intact.      Conjunctiva/sclera: Conjunctivae normal.      Pupils: Pupils are equal, round, and reactive to light.   Neck:      Vascular: No carotid bruit.    Cardiovascular:      Rate and Rhythm: Normal rate and regular rhythm.      Pulses: Normal pulses.      Heart sounds: Normal heart sounds. No murmur heard.  Pulmonary:      Effort: Pulmonary effort is normal. No respiratory distress.      Breath sounds: Normal breath sounds. No rhonchi.   Abdominal:      General: Abdomen is flat. Bowel sounds are normal. There is no distension.      Palpations: Abdomen is soft. There is no mass.      Tenderness: There is no abdominal tenderness. There is no guarding.   Musculoskeletal:         General: No tenderness. Normal range of motion.        Feet:    Lymphadenopathy:      Cervical: No cervical adenopathy.   Skin:     General: Skin is warm.      Capillary Refill: Capillary refill takes less than 2 seconds.      Coloration: Skin is not jaundiced.      Findings: Rash present.   Neurological:      General: No focal deficit present.      Mental Status: She is alert and oriented to person, place, and time.   Psychiatric:         Attention and Perception: Attention normal.         Mood and Affect: Mood is anxious.         Speech: Speech is not tangential.         Behavior: Behavior normal.         Thought Content: Thought content normal.         Cognition and Memory: Cognition normal.         Judgment: Judgment normal.         DIAGNOSTIC STUDIES:   LAB RESULTS:    No results found for this visit on 03/07/22.  Lab Services on 02/19/2022   Component Date Value Ref Range Status   • Hemoglobin A1C 02/19/2022 7.0 (A) 4.5 - 5.6 % Final   • Sodium 02/19/2022 142  135 - 145 mmol/L Final   • Potassium 02/19/2022 4.1  3.4 - 5.1 mmol/L Final   • Chloride 02/19/2022 107  98 - 107 mmol/L Final   • Carbon Dioxide 02/19/2022 28  21 - 32 mmol/L Final   • Anion Gap 02/19/2022 11  10 - 20 mmol/L Final   • Glucose 02/19/2022 119 (A) 70 - 99 mg/dL Final   • BUN 02/19/2022 8  6 - 20 mg/dL Final   • Creatinine 02/19/2022 0.70  0.51 - 0.95 mg/dL Final   • Glomerular Filtration Rate 02/19/2022 >90  >=60  Final   • BUN/ Creatinine Ratio 02/19/2022 11  7 - 25 Final   • Calcium 02/19/2022 8.9  8.4 - 10.2 mg/dL Final   • Bilirubin, Total 02/19/2022 0.3  0.2 - 1.0 mg/dL Final   • GOT/AST 02/19/2022 18  <=37 Units/L Final   • GPT/ALT 02/19/2022 26  <64 Units/L Final   • Alkaline Phosphatase 02/19/2022 109  45 - 117 Units/L Final   • Albumin 02/19/2022 3.5 (A) 3.6 - 5.1 g/dL Final   • Protein, Total 02/19/2022 7.2  6.4 - 8.2 g/dL Final   • Globulin 02/19/2022 3.7  2.0 - 4.0 g/dL Final   • A/G Ratio 02/19/2022 0.9 (A) 1.0 - 2.4 Final   • Allergen: Oat, IgE 02/19/2022 0.90 (A) <0.35 KU/L Final   • Oat Class 02/19/2022 2 (A) 0 Final   • Allergen: Orange, IgE 02/19/2022 <0.35  <0.35 KU/L Final   • Orange Class 02/19/2022 0  0 Final   • Allergen: Potato, IgE 02/19/2022 <0.35  <0.35 KU/L Final   • Potato Class 02/19/2022 0  0 Final   • Allergen: Pork, IgE 02/19/2022 <0.35  <0.35 KU/L Final   • Pork Class 02/19/2022 0  0 Final   • Allergen: Tomato, IgE 02/19/2022 0.63 (A) <0.35 KU/L Final   • Tomato Class 02/19/2022 1 (A) 0 Final   • Allergen: Egg White, IgE 02/19/2022 <0.35  <0.35 KU/L Final   • Egg White Class 02/19/2022 0  0 Final   • Allergen: Cow Milk, IgE 02/19/2022 <0.35  <0.35 KU/L Final   • Cow Milk Class 02/19/2022 0  0 Final   • Allergen: Rice, IgE 02/19/2022 0.79 (A) <0.35 KU/L Final   • Rice Class 02/19/2022 2 (A) 0 Final   • Allergen: Soybean, IgE 02/19/2022 <0.35  <0.35 KU/L Final   • Soybean Class 02/19/2022 0  0 Final   • Allergen: Wheat, IgE 02/19/2022 0.42 (A) <0.35 KU/L Final   • Wheat Class 02/19/2022 1 (A) 0 Final   • Allergen: Barley, IgE 02/19/2022 <0.35  <0.35 KU/L Final   • Barley Class 02/19/2022 0  0 Final   • Allergen: Corn, IgE 02/19/2022 0.35 (A) <0.35 KU/L Final   • Corn Class 02/19/2022 1 (A) 0 Final   • Allergen: Gluten, IgE 02/19/2022 <0.35  <0.35 KU/L Final   • Gluten Class 02/19/2022 0  0 Final   • Allergen: Peanut, IgE 02/19/2022 <0.35  <0.35 KU/L Final   • Peanut Class 02/19/2022 0  0 Final    • Allergen: Rye, IgE 02/19/2022 0.46 (A) <0.35 KU/L Final   • Rye Class 02/19/2022 1 (A) 0 Final   • Allergen: Chicken, IgE 02/19/2022 <0.35  0 - 0.35 KU/L Final   • Chicken Class 02/19/2022 0  0 Final   • Allergen: Beef, IgE 02/19/2022 <0.35  <0.35 KU/L Final   • Beef Class 02/19/2022 0  0 Final   • Allergen: Yeast, IgE 02/19/2022 1.96 (A) <0.35 KU/L Final   • Yeast Class 02/19/2022 2 (A) 0 Final   • Allergen: Cheddar Cheese, IgE 02/19/2022 <0.35  0 - 0.35 KU/L Final   • Cheddar Cheese Class 02/19/2022 0  0 Final   • Allergen: Banana, IgE 02/19/2022 0.35 (A) <0.35 KU/L Final   • Banana Class 02/19/2022 1 (A) 0 Final   • TSH 02/19/2022 1.319  0.350 - 5.000 mcUnits/mL Final   • Cholesterol 02/19/2022 176  <=199 mg/dL Final   • Triglycerides 02/19/2022 86  <=149 mg/dL Final   • HDL 02/19/2022 45 (A) >=50 mg/dL Final   • LDL 02/19/2022 114  <=129 mg/dL Final   • Non-HDL Cholesterol 02/19/2022 131  mg/dL Final   • Cholesterol/ HDL Ratio 02/19/2022 3.9  <=4.4 Final   • Vitamin D, 25-Hydroxy 02/19/2022 10.8 (A) 30.0 - 100.0 ng/mL Final   • Allergen: Elm Tree, IgE 02/19/2022 <0.35  <0.35 KU/L Final   • Elm Tree Class 02/19/2022 0  0 Final   • Allergen: Short Ragweed, IgE 02/19/2022 <0.35  <0.35 KU/L Final   • Short Ragweed Class 02/19/2022 0  0 Final   • Allergen: Dermatophagoides Farinae* 02/19/2022 <0.35  <0.35 KU/L Final   • Dermatophagoides Farinae Class 02/19/2022 0  0 Final   • Allergen: Mountain Juniper, IgE 02/19/2022 0.37 (A) <0.35 KU/L Final   • Mountain Juniper Class 02/19/2022 1 (A) 0 Final   • Allergen: Benjamin Grass, IgE 02/19/2022 3.15 (A) <0.35 KU/L Final   • Benjamin Grass Class 02/19/2022 2 (A) 0 Final   • Allergen: [place] Tree, IgE 02/19/2022 <0.35  <0.35 KU/L Final   • [place] Tree Class 02/19/2022 0  0 Final   • Allergen: Alternaria Tenuis, IgE 02/19/2022 13.70 (A) <0.35 KU/L Final   • Alternaria Tenuis Class 02/19/2022 3 (A) 0 Final   • Allergen: Oak Tree, IgE 02/19/2022 <0.35  <0.35 KU/L Final    • [place] Tree Class 02/19/2022 0  0 Final   • Allergen: Rough [place]er Grass, * 02/19/2022 0.37 (A) <0.35 KU/L Final   • Marshelder Class 02/19/2022 1 (A) 0 Final   • Allergen: Dermatophagoides Pterony* 02/19/2022 <0.35  0 - 0.35 KU/L Final   • Dermatophagoides Pteronyssinus Cla* 02/19/2022 0  0 Final   • Allergen: [place] Tree, IgE 02/19/2022 <0.35  <0.35 KU/L Final   • [place] Tree Class 02/19/2022 0  0 Final   • Allergen: [place] Grass, IgE 02/19/2022 <0.35  <0.35 KU/L Final   • [place] Grass Class 02/19/2022 0  0 Final   • Allergen: [place] Tree, IgE 02/19/2022 0.45 (A) <0.35 KU/L Final   • [place] Tree Class 02/19/2022 1 (A) 0 Final   • Allergen: Mouse Epithelium, IgE 02/19/2022 <0.35  <0.35 KU/L Final   • Mouse Epithelium Class 02/19/2022 0  0 Final   • Allergen: Box [place] Tree, IgE 02/19/2022 <0.35  <0.35 KU/L Final   • [place] Tree Class 02/19/2022 0  0 Final   • Allergen: Cat Dander, IgE 02/19/2022 0.60 (A) <0.35 KU/L Final   • Cat Dander Class 02/19/2022 1 (A) 0 Final   • Allergen: Aspergillus Fumigatus, I* 02/19/2022 2.36 (A) <0.35 KU/L Final   • Aspergillus Fumigatus, Class 02/19/2022 2 (A) 0 Final   • Allergen: Pigweed, IgE 02/19/2022 <0.35  <0.35 KU/L Final   • Pigweed Class 02/19/2022 0  0 Final   • Allergen: White [place] Tree, IgE 02/19/2022 0.90 (A) <0.35 KU/L Final   • White [place] Tree Class 02/19/2022 2 (A) 0 Final   • Allergen: Largo and Pecan Tree, * 02/19/2022 <0.35  <0.35 KU/L Final   • Largo and Pecan Tree Class 02/19/2022 0  0 Final   • Allergen: Mucor Racemosus, IgE 02/19/2022 <0.35  <0.35 KU/L Final   • Mucor Racemosus Class 02/19/2022 0  0 Final   • Allergen: Cockroach, IgE 02/19/2022 <0.35  <0.35 KU/L Final   • Cockroach Class 02/19/2022 0  0 Final   • Allergen: Dog Dander, IgE 02/19/2022 19.50 (A) <0.35 KU/L Final   • Dog Dander Class 02/19/2022 4 (A) 0 Final   • Allergen: Cladosporium Herbarum, I* 02/19/2022 4.05 (A) <0.35 KU/L Final   • Cladosporium Herbarium Class 02/19/2022 3  (A) 0 Final   • Allergen: Russian Thistle, IgE 02/19/2022 <0.35  <0.35 KU/L Final   • Russian Thistle Class 02/19/2022 0  0 Final   • Allergen: Penicillium Chrysogenium* 02/19/2022 0.95 (A) <0.35 KU/L Final   • Penicillium Chrysogenium Class 02/19/2022 2 (A) 0 Final   • Allergen: Burney, IgE 02/19/2022 <0.35  <0.35 KU/L Final   • Burney Class 02/19/2022 0  0 Final   • Allergen: Birch Tree, IgE 02/19/2022 <0.35  0 - 0.35 KU/L Final   • [place] Class 02/19/2022 0  0 Final   • WBC 02/19/2022 6.3  4.2 - 11.0 K/mcL Final   • RBC 02/19/2022 4.88  4.00 - 5.20 mil/mcL Final   • HGB 02/19/2022 12.5  12.0 - 15.5 g/dL Final   • HCT 02/19/2022 39.5  36.0 - 46.5 % Final   • MCV 02/19/2022 80.9  78.0 - 100.0 fl Final   • MCH 02/19/2022 25.6 (A) 26.0 - 34.0 pg Final   • MCHC 02/19/2022 31.6 (A) 32.0 - 36.5 g/dL Final   • RDW-CV 02/19/2022 14.8  11.0 - 15.0 % Final   • RDW-SD 02/19/2022 43.4  39.0 - 50.0 fL Final   • PLT 02/19/2022 302  140 - 450 K/mcL Final   • NRBC 02/19/2022 0  <=0 /100 WBC Final   • Neutrophil, Percent 02/19/2022 49  % Final   • Lymphocytes, Percent 02/19/2022 44  % Final   • Mono, Percent 02/19/2022 5  % Final   • Eosinophils, Percent 02/19/2022 2  % Final   • Basophils, Percent 02/19/2022 0  % Final   • Immature Granulocytes 02/19/2022 0  % Final   • Absolute Neutrophils 02/19/2022 3.0  1.8 - 7.7 K/mcL Final   • Absolute Lymphocytes 02/19/2022 2.7  1.0 - 4.8 K/mcL Final   • Absolute Monocytes 02/19/2022 0.3  0.3 - 0.9 K/mcL Final   • Absolute Eosinophils  02/19/2022 0.1  0.0 - 0.5 K/mcL Final   • Absolute Basophils 02/19/2022 0.0  0.0 - 0.3 K/mcL Final   • Absolute Immmature Granulocytes 02/19/2022 0.0  0.0 - 0.2 K/mcL Final       ASSESSMENT AND PLAN:     Problem List Items Addressed This Visit     Problem List Items Addressed This Visit        Allergies and Adverse Reactions    Multiple allergies - Primary    Relevant Orders    SERVICE TO DERMATOLOGY       Endocrine and Metabolic    Vitamin D  deficiency    Relevant Medications    Vitamin D, Ergocalciferol, 1.25 mg (50,000 units) capsule       Sleep    CRISTOFER (obstructive sleep apnea)      Other Visit Diagnoses     Type 2 diabetes mellitus with hyperglycemia, without long-term current use of insulin (CMS/[facility])        Nutrition & Exercise:  [x] Limit the amount of sugar sweetened drinks like soda pop and juice.  [x] Family to continue to follow a meal plan and avoid grazing.  [x] Family to schedule a visit with nutritionist to review carb counting and meal          plan.  [x] Recommend 30-60 minutes of physical activity 4-5 days a week    glipiZIDE (GLUCOTROL ER) 10 MG 24 hr tablet    Other Relevant Orders    GLYCOHEMOGLOBIN    SERVICE TO NUTRITION COUNSELING    Elevated blood pressure reading  Stable continue current regiment    Mild major depression (CMS/[facility])        Currently not taking any medication  Instructed if she is feeling any suicidal or homicidal ideation to report to PCP or emergency department immediately    SERVICE TO BEHAVIORAL HEALTH    Attention deficit hyperactivity disorder (ADHD), combined type        Patient to bring in paperwork with records regarding ADHD  Referral placed to behavioral health for current work-up    SERVICE TO BEHAVIORAL HEALTH                                                                                                                                                                                            I spent 40 minutes of total time on date of encounter (including reviewing the patient's chart, obtaining history, performing an exam, counseling the patient, coordinating care, and documenting clinical information in the EMR)        Return in about 3 months (around 6/7/2022). patient to follow after followup with allergist and sleep medicine.     Instructions provided as documented in the AVS.    The patient indicated understanding of the diagnosis, agreed with the plan of care and agreed to call or return with  any new or worsening symptoms.    Nitza Garrett, DO  3/7/2022

## 2024-12-24 VITALS
OXYGEN SATURATION: 98 % | TEMPERATURE: 97.1 F | HEART RATE: 60 BPM | SYSTOLIC BLOOD PRESSURE: 142 MMHG | BODY MASS INDEX: 19.25 KG/M2 | WEIGHT: 134.48 LBS | DIASTOLIC BLOOD PRESSURE: 90 MMHG | HEIGHT: 70 IN | RESPIRATION RATE: 16 BRPM

## 2024-12-24 PROCEDURE — 6370000000 HC RX 637 (ALT 250 FOR IP): Performed by: PSYCHIATRY & NEUROLOGY

## 2024-12-24 PROCEDURE — 99239 HOSP IP/OBS DSCHRG MGMT >30: CPT

## 2024-12-24 PROCEDURE — 6370000000 HC RX 637 (ALT 250 FOR IP)

## 2024-12-24 PROCEDURE — 6370000000 HC RX 637 (ALT 250 FOR IP): Performed by: INTERNAL MEDICINE

## 2024-12-24 RX ADMIN — Medication 100 MG: at 08:24

## 2024-12-24 RX ADMIN — MULTIVITAMIN TABLET 1 TABLET: TABLET at 08:24

## 2024-12-24 RX ADMIN — DIVALPROEX SODIUM 250 MG: 250 TABLET, DELAYED RELEASE ORAL at 08:24

## 2024-12-24 RX ADMIN — BACLOFEN 10 MG: 10 TABLET ORAL at 08:24

## 2024-12-24 RX ADMIN — Medication 1 MG: at 08:24

## 2024-12-24 ASSESSMENT — PAIN SCALES - GENERAL
PAINLEVEL_OUTOF10: 0
PAINLEVEL_OUTOF10: 0

## 2024-12-24 NOTE — TRANSITION OF CARE
Behavioral Health Transition Record    Patient Name: Michelet Anthony  YOB: 1985   Medical Record Number: [id number]  Date of Admission: 12/18/2024  1:35 PM   Date of Discharge: 12/24/2024    Attending Provider: Anastasia Mittal MD   Discharging Provider: Dr mittal  To contact this individual call [phone] and ask the  to page.  If unavailable, ask to be transferred to Behavioral Health Provider on call.  A Behavioral Health Provider will be available on call 24/7 and during holidays.    Primary Care Provider: Neri Yadav DO    Allergies   Allergen Reactions    Cipro Xr Hives       Reason for Admission: Patient name: Michelet Anthony  Patient's past mental health and addiction history: Depression and alcohol abuse with no previous inpatient psychiatric hospitalization   patient's presentation to the ED and why the patient needs admission: Going through alcohol withdrawal and expressed suicidal ideations  Legal status:  []  Voluntary  [x]  Involuntary   []  Probate  Triggering/precipitating events: Financial problems that affected his relationship with fiance  Duration of triggering/precipitating events: Financial problems for the past 6 months    Admission Diagnosis: Mood disorder (HCC) [F39]    * No surgery found *    Results for orders placed or performed during the hospital encounter of 12/18/24   Lipid Panel   Result Value Ref Range    Cholesterol, Total 203 (H) <200 mg/dL    HDL 47 >40 mg/dL    LDL Cholesterol 107 (H) <100 mg/dL    Triglycerides 243 (H) <150 mg/dL    VLDL 49 mg/dL   Hemoglobin A1c   Result Value Ref Range    Hemoglobin A1C 4.8 4.0 - 5.6 %   Valproic Acid Level, Total   Result Value Ref Range    Valproic Acid Lvl 57 50 - 100 ug/mL    Valproic Dose amount Unknown     Valproic Date last dose UNK^Unknown^L     Valproic Time last dose UNK^Unknown^L        Immunizations administered during this encounter:   Immunization History   Administered Date(s) Administered

## 2024-12-24 NOTE — CARE COORDINATION
[facility] Recovery Support Note       Name:  Michelet Anthony   Date:    12/18/2024      No chief complaint on file.          Peer Support met with patient.  [x] Support and education provided  [] Resources provided   [] Treatment referral:   [] Other:   [] Patient declined peer recovery services      [facility]     Notes:  Met with patient. The patient did express that he wanted to go to treatment but that he wanted to talk with family first, in hopes to set up financial support for his significant other while he would be away at a thirty (30) day treatment facility for [facility]JANA.    Patient also doesn't have any insurance at the moment.    I will pass this information on to my fellow Peers so that they can keep communication with Maryan to see when a good time to met with the patient will be, since the patient just got to the seventh floor.    Electronically signed by Guerrero Malcoml on 12/18/2024 at 3:25 PM   
     Peer [facility] Support Note       Name:  Michelet Anthony   Date:    12/20/2024      No chief complaint on file.          Peer Support met with patient.  [x] Support and education provided  [x] Resources provided   [] Treatment referral:   [] Other:   [] Patient declined peer recovery services      Referred By: Joanie CESPEDES)     Notes:  Peer met with patient. The patient was interested in engaging with peer. Patient shared her reasoning for being hospitalized. Patient expressed an interest into going into residential treatment upon discharge. However, the patient would like to go home to see his dog and retrieve clothing and cigarettes prior to going into treatment. Peer has advised  of this request. Josette feels [facility] or [facility] would be an adequate choice since patient would like to remain local. Peer will follow up with patient on 12/23/2024.  
     Peer Recovery Support Note       Name:  Michelet Anthony   Date:    12/23/2024      No chief complaint on file.          Peer Support met with patient.  [x] Support and education provided  [] Resources provided   [x] Treatment referral: [facility]  [] Other:   [] Patient declined peer recovery services      Referred By: Joanie (KENYA)     Notes:  Peer met with patient, patient was willing and eager to discuss inpatient treatment opportunities. Patient mentioned that there has been a change and his girlfriend is not willing to allow him to see his dog or come to the home. Peer asked patient how he felt about this change of events. Patient states that \"it is what it is.\" Patient is interested in going into [facility] upon discharge. Peer will advise  to fax patient's documents to [phone] and follow-up with peer later today.  
     Peer [facility] Support Note       Name:  Michelet Anthony   Date:    12/23/2024      No chief complaint on file.          Peer Support met with patient.  [x] Support and education provided  [] Resources provided   [x] Treatment referral: [facility]  [] Other:   [] Patient declined peer recovery services      Referred By: Joanie (KENYA)     Notes:  Peer met with patient. Patient was willing and eager to meet with peer. Peer reached out to Junior of admissions at [facility]. Patient completed phone screening with admissions and patient has been accepted into [facility]. KENYA stated that patient should be discharged on 12-. Peer shared nurses stations phone number [phone] to call to arrange for transportation.  
CTRS met with patient to complete leisure assessment.  Patient wants to speak with peer recovery and CTRS phoned peer recovery.  Mirella from peer recovery will have someone come talk to him.         12/18/24 1417   Activities of Daily Living   Patient Requires assistance with daily self-care activities? No   Leisure Activity 1   3 Favorite Leisure Activities \"video games, dogs\"   Frequency <2 hours/day   Last time in the last 3 months   Barriers to participating  motivation;financial/money   Social   Patient reports spending the majority of their free time with one other person   Patient verbalizes a preference for spending free time with one other person   Patient’s perception of support system more healthy  (patient reports his friend mich Perez, and mom are supportive of his care.)   Patient’s perception of barriers to socializing with others include(s) lack of motivation/interest;finances   Social Details patient currently lives with his fiance.  He has no childrne.  He works at an auto part store.  Patient reports this is his first hospitalization and has no out patient provider.   Beliefs & Coping   Has difficulty dealing with feelings   Yes   Internalizes feelings/Keeps feelings in Yes   Externalizes feelings through aggressiveness or poor temper control  No   Feels uncomfortable around others  Yes   Has difficulty talking to others  Yes   Depends on others for direction or decisions No   Difficulty dealing with anger of others  No   Difficulty dealing with own anger  No   Difficulty managing stress Yes  (\"finanes, relationship with my fiance)   Frequently has difficulty with relationships  Yes   which,who,where in family  (recent conflict with fiance d/t finances)   Has recently perceived/experienced loss, disappointment, humiliation or failure  Yes  (patient reports he lost a \"great job 6 months ago\")   General perception about self does not like self   Attitude about abilities occasionally fails 
Clinical information faxed to [facility].   
Discharge:    Sw met with pt to discuss discharge. Pt denied SI/HI/AVH. Pt stated that he is ready to be discharged. He stated that he is ready to go to rehab. He knows he will be going to [facility] and that he will be leaving today.     Appointments:    [facility] will continue to manage pts mental health and substance use needs.       In order to ensure appropriate transition and discharge planning is in place, the following documents have been transmitted to [facility], as the new outpatient provider:    The d/c diagnosis was transmitted to the next care provider  The reason for hospitalization was transmitted to the next care provider  The d/c medications (dosage and indication) were transmitted to the next care provider   The continuing care plan was transmitted to the next care provider       
Sw called [facility] [phone] and spoke with admission. They stated that there is no bed and that they di not know when a bed will be available. They stated to call back tomorrow.   
Sw met with pt to discuss rehab. Sw asked pt if he would like to talk to peer recovery, pt stated yes.     [facility] ext [phone] was called.   
[] Moderate [x] High    Homicidal Ideation  [] Past [] Present [x] Denies     Hallucinations/Delusions (Specify type)  [] Reports [x] Denies     Current or Past Mental Health Treatment:  [] Yes, When and Where:   [x] No    Substance Use/Alcohol Use/Addiction  [x] Reports [] Denies     Tobacco Use (within the last 6 months)  [x] Reports [] Denies     Trauma History  [] Reports [x] Denies     Self Injurious/Self Mutilation Behaviors:   [] Reports:    [] Past [] Present   [x] Denies    Legal History:  [x]  Yes (Specify)  DUI's  [] No    Collateral Contact (JAYLON signed): Declined  Name:   Relationship:  Number:     Collateral Information:      Access to Weapons per Collateral Contact: [] Reports [] Denies     After consideration of C-SSRS screening results, C-SSRS assessments, and this professional's assessment the patient's overall suicide risk assessed to be:  [] None   [] Low   [x] Moderate   [] High     [x] Discussed current suicide risk, protective and risk factors with RN and NP/Psychiatrist.    Discharge Plan:  [x] Home: with fiance  [] Shelter:  [] Crisis Unit:  [] Substance Abuse Rehab:  [] Nursing Facility:  [] Other (Specify):    Follow up Provider: KWAN

## 2024-12-24 NOTE — DISCHARGE SUMMARY
DISCHARGE SUMMARY      Patient ID:  Michelet Anthony  [id number]  39 y.o.  1985    Admit date: 12/18/2024    Discharge date and time: 12/24/2024    Admitting Physician: Anastasia Beltran MD     Discharge Physician: Dr Devin SUE    Discharge Diagnoses:   Patient Active Problem List   Diagnosis    Severe protein-calorie malnutrition (HCC)    Alcohol abuse    Alcohol abuse with withdrawal (HCC)    Depression    Suicidal ideation    Mood disorder (HCC)    Major depressive disorder, recurrent episode, severe with mixed features (HCC)       Admission Condition: poor    Discharged Condition: stable    Admission Circumstance: Patient name: Michelet Anthony  Patient's past mental health and addiction history: Depression and alcohol abuse with no previous inpatient psychiatric hospitalization   patient's presentation to the ED and why the patient needs admission: Going through alcohol withdrawal and expressed suicidal ideations  Legal status:  []  Voluntary  [x]  Involuntary   []  Probate  Triggering/precipitating events: Financial problems that affected his relationship with fiance  Duration of triggering/precipitating events: Financial problems for the past 6 months      PAST MEDICAL/PSYCHIATRIC HISTORY:   Past Medical History:   Diagnosis Date    Benign essential tremor     Tremor        FAMILY/SOCIAL HISTORY:  Family History   Problem Relation Age of Onset    Diabetes Mother     Cancer Father     Asthma Sister      Social History     Socioeconomic History    Marital status: Single     Spouse name: Not on file    Number of children: Not on file    Years of education: Not on file    Highest education level: Not on file   Occupational History    Not on file   Tobacco Use    Smoking status: Every Day     Current packs/day: 0.50     Types: Cigars, Cigarettes    Smokeless tobacco: Never    Tobacco comments:     Black & Milds    Vaping Use    Vaping status: Never Used   Substance and Sexual Activity    Alcohol use: Yes

## 2024-12-24 NOTE — DISCHARGE INSTRUCTIONS
Follow up for Tobacco Cessation at:    [facility]                                 Date:  Friday 12/27 at 10am              [address]    [place] [address]   (Inside [facility]    take B elevators to 7th floor)   Phone: [phone]   Fax: [phone]

## 2024-12-24 NOTE — PLAN OF CARE
Problem: Behavior  Goal: Pt/Family maintain appropriate behavior and adhere to behavioral management agreement, if implemented  Description: INTERVENTIONS:  1. Assess patient/family's coping skills and  non-compliant behavior (including use of illegal substances)  2. Notify security of behavior or suspected illegal substances which indicate the need for search of the family and/or belongings  3. Encourage verbalization of thoughts and concerns in a socially appropriate manner  4. Utilize positive, consistent limit setting strategies supporting safety of patient, staff and others  5. Encourage participation in the decision making process about the behavioral management agreement  6. If a visitor's behavior poses a threat to safety call refer to organization policy.  7. Initiate consult with , Psychosocial CNS, Spiritual Care as appropriate  12/23/2024 2158 by Norah Mackay, RN  Outcome: Progressing     Problem: Anxiety  Goal: Will report anxiety at manageable levels  Description: INTERVENTIONS:  1. Administer medication as ordered  2. Teach and rehearse alternative coping skills  3. Provide emotional support with 1:1 interaction with staff  12/23/2024 2158 by Norah Mackay, RN  Outcome: Progressing     Patient has been out on the unit watching tv. Pleasant and cooperative during assessment. Affect is brightened. Rates anxiety 2/10 and that it has improved. Denies depression. Denies suicidal/homicidal ideations and hallucinations. Purposeful rounding continued.   
  Problem: Behavior  Goal: Pt/Family maintain appropriate behavior and adhere to behavioral management agreement, if implemented  Description: INTERVENTIONS:  1. Assess patient/family's coping skills and  non-compliant behavior (including use of illegal substances)  2. Notify security of behavior or suspected illegal substances which indicate the need for search of the family and/or belongings  3. Encourage verbalization of thoughts and concerns in a socially appropriate manner  4. Utilize positive, consistent limit setting strategies supporting safety of patient, staff and others  5. Encourage participation in the decision making process about the behavioral management agreement  6. If a visitor's behavior poses a threat to safety call refer to organization policy.  7. Initiate consult with , Psychosocial CNS, Spiritual Care as appropriate  Outcome: Progressing     Problem: Anxiety  Goal: Will report anxiety at manageable levels  Description: INTERVENTIONS:  1. Administer medication as ordered  2. Teach and rehearse alternative coping skills  3. Provide emotional support with 1:1 interaction with staff  Outcome: Not Progressing     Problem: Drug Abuse/Detox  Goal: Will have no detox symptoms and will verbalize plan for changing drug-related behavior  Description: INTERVENTIONS:  1. Administer medication as ordered  2. Monitor physical status  3. Provide emotional support with 1:1 interaction with staff  4. Encourage  recovery focused treatment   Outcome: Not Progressing     
Patient denies SI/HI/AVH. Endorses anxiety 1/10 D/T wanting \"to go home.\" Denies depression. Denies racing thoughts. No paranoia or delusions reported or observed. Appears bright, friendly, and cooperative during assessment. Reports appetite and sleep are good. Patient is taking medications without issues and is attending groups. Remains in control of behaviors.     Problem: Self Harm/Suicidality  Goal: Will have no self-injury during hospital stay  12/22/2024 1154 by Libia Sanchez RN  Outcome: Progressing     Problem: Depression  Goal: Will be euthymic at discharge  12/22/2024 1154 by Libia Sanchez RN  Outcome: Progressing     Problem: Hellen  Goal: Will exhibit normal sleep and speech and no impulsivity  Outcome: Progressing     Problem: Psychosis  Goal: Will report no hallucinations or delusions  Outcome: Progressing     Problem: Behavior  Goal: Pt/Family maintain appropriate behavior and adhere to behavioral management agreement, if implemented  Outcome: Progressing     Problem: Anxiety  Goal: Will report anxiety at manageable levels  12/22/2024 1154 by Libia Sanchez RN  Outcome: Progressing     Problem: Drug Abuse/Detox  Goal: Will have no detox symptoms and will verbalize plan for changing drug-related behavior  12/22/2024 1154 by Libia Sanchez RN  Outcome: Progressing     Problem: Sleep Disturbance  Goal: Will exhibit normal sleeping pattern  Outcome: Progressing     Problem: Involuntary Admit  Goal: Will cooperate with staff recommendations and doctor's orders and will demonstrate appropriate behavior  Outcome: Progressing     Problem: Self Care Deficit  Goal: Return ADL status to a safe level of function  Outcome: Progressing     Problem: Pain  Goal: Verbalizes/displays adequate comfort level or baseline comfort level  Outcome: Progressing     
Patient denies suicidal ideation, homicidal ideation, and AV hallucinations. He is cooperative, friendly, and brightens with conversation. He appears disheveled and poorly groomed. He denies having any anxiety or depression. He is compliant with medications, and attending groups. He is in control of his behavior at this time.     Problem: Self Harm/Suicidality  Goal: Will have no self-injury during hospital stay  Description: INTERVENTIONS:  1.  Ensure constant observer at bedside with Q15M safety checks  2.  Maintain a safe environment  3.  Secure patient belongings  4.  Ensure family/visitors adhere to safety recommendations  5.  Ensure safety tray has been added to patient's diet order  6.  Every shift and PRN: Re-assess suicidal risk via Frequent Screener    12/21/2024 0914 by Avelina Salvador RN  Outcome: Progressing     Problem: Depression  Goal: Will be euthymic at discharge  Description: INTERVENTIONS:  1. Administer medication as ordered  2. Provide emotional support via 1:1 interaction with staff  3. Encourage involvement in milieu/groups/activities  4. Monitor for social isolation  12/21/2024 0914 by Avelina Salvador RN  Outcome: Progressing     Problem: Hellen  Goal: Will exhibit normal sleep and speech and no impulsivity  Description: INTERVENTIONS:  1. Administer medication as ordered  2. Set limits on impulsive behavior  3. Make attempts to decrease external stimuli as possible  Outcome: Progressing     Problem: Psychosis  Goal: Will report no hallucinations or delusions  Description: INTERVENTIONS:  1. Administer medication as  ordered  2. Assist with reality testing to support increasing orientation  3. Assess if patient's hallucinations or delusions are encouraging self harm or harm to others and intervene as appropriate  Outcome: Progressing     Problem: Behavior  Goal: Pt/Family maintain appropriate behavior and adhere to behavioral management agreement, if implemented  Description: 
Patient denies suicidal ideation, homicidal ideation, auditory/visual hallucinations. Denies anxiety and depression. Denies racing thoughts. No paranoia or delusions reported or observed. Appears flat, sad, anxious, and cooperative during assessment. Reports appetite and sleep are good. Patient is taking medications without issues and is attending groups. Remains in control of behaviors.        Problem: Self Harm/Suicidality  Goal: Will have no self-injury during hospital stay  Description: INTERVENTIONS:  1.  Ensure constant observer at bedside with Q15M safety checks  2.  Maintain a safe environment  3.  Secure patient belongings  4.  Ensure family/visitors adhere to safety recommendations  5.  Ensure safety tray has been added to patient's diet order  6.  Every shift and PRN: Re-assess suicidal risk via Frequent Screener    12/20/2024 1005 by Awilda Tejeda RN  Outcome: Progressing     Problem: Depression  Goal: Will be euthymic at discharge  Description: INTERVENTIONS:  1. Administer medication as ordered  2. Provide emotional support via 1:1 interaction with staff  3. Encourage involvement in milieu/groups/activities  4. Monitor for social isolation  Outcome: Progressing     Problem: Behavior  Goal: Pt/Family maintain appropriate behavior and adhere to behavioral management agreement, if implemented  Description: INTERVENTIONS:  1. Assess patient/family's coping skills and  non-compliant behavior (including use of illegal substances)  2. Notify security of behavior or suspected illegal substances which indicate the need for search of the family and/or belongings  3. Encourage verbalization of thoughts and concerns in a socially appropriate manner  4. Utilize positive, consistent limit setting strategies supporting safety of patient, staff and others  5. Encourage participation in the decision making process about the behavioral management agreement  6. If a visitor's behavior poses a threat to safety 
Patient denies suicidal/homicidal ideations and hallucinations. Patient denies  depression, reports anxiety is 2/10 due to possible discharge tomorrow. Patient is friendly and cooperative, brightens with conversation. Patient is taking ordered medications without issue. Continued support offered to patient. Safety rounds continue.     Problem: Self Harm/Suicidality  Goal: Will have no self-injury during hospital stay  Description: INTERVENTIONS:  1.  Ensure constant observer at bedside with Q15M safety checks  2.  Maintain a safe environment  3.  Secure patient belongings  4.  Ensure family/visitors adhere to safety recommendations  5.  Ensure safety tray has been added to patient's diet order  6.  Every shift and PRN: Re-assess suicidal risk via Frequent Screener    12/22/2024 2016 by Monica Iqbal RN  Outcome: Progressing     Problem: Depression  Goal: Will be euthymic at discharge  Description: INTERVENTIONS:  1. Administer medication as ordered  2. Provide emotional support via 1:1 interaction with staff  3. Encourage involvement in milieu/groups/activities  4. Monitor for social isolation  12/22/2024 2016 by Monica Iqbal RN  Outcome: Progressing     Problem: Anxiety  Goal: Will report anxiety at manageable levels  Description: INTERVENTIONS:  1. Administer medication as ordered  2. Teach and rehearse alternative coping skills  3. Provide emotional support with 1:1 interaction with staff  12/22/2024 2016 by Monica Iqbal RN  Outcome: Progressing     Problem: Drug Abuse/Detox  Goal: Will have no detox symptoms and will verbalize plan for changing drug-related behavior  Description: INTERVENTIONS:  1. Administer medication as ordered  2. Monitor physical status  3. Provide emotional support with 1:1 interaction with staff  4. Encourage  recovery focused treatment   12/22/2024 2016 by Monica Iqbal, RN  Outcome: Progressing     
Patient denies suicidal/homicidal ideations and hallucinations. Patient reports anxiety 2/10 and denies depression. Patient is taking ordered medications without issue. Continued support offered to patient. Safety rounds continue.     Problem: Self Harm/Suicidality  Goal: Will have no self-injury during hospital stay  Description: INTERVENTIONS:  1.  Ensure constant observer at bedside with Q15M safety checks  2.  Maintain a safe environment  3.  Secure patient belongings  4.  Ensure family/visitors adhere to safety recommendations  5.  Ensure safety tray has been added to patient's diet order  6.  Every shift and PRN: Re-assess suicidal risk via Frequent Screener    12/21/2024 2243 by Monica Iqbal RN  Outcome: Progressing     Problem: Depression  Goal: Will be euthymic at discharge  Description: INTERVENTIONS:  1. Administer medication as ordered  2. Provide emotional support via 1:1 interaction with staff  3. Encourage involvement in milieu/groups/activities  4. Monitor for social isolation  12/21/2024 2243 by Monica Iqbal RN  Outcome: Progressing     Problem: Drug Abuse/Detox  Goal: Will have no detox symptoms and will verbalize plan for changing drug-related behavior  Description: INTERVENTIONS:  1. Administer medication as ordered  2. Monitor physical status  3. Provide emotional support with 1:1 interaction with staff  4. Encourage  recovery focused treatment   Outcome: Progressing     Problem: Anxiety  Goal: Will report anxiety at manageable levels  Description: INTERVENTIONS:  1. Administer medication as ordered  2. Teach and rehearse alternative coping skills  3. Provide emotional support with 1:1 interaction with staff  12/21/2024 2243 by Monica Iqbal, RN  Outcome: Progressing     
Patient denies suicidal/homicidal ideations and hallucinations. Patient reports anxiety and depression both 2/10, stating he is feeling better since getting here. Patient is taking ordered medications without issue. Continued support offered to patient. Safety rounds continue.     Problem: Self Harm/Suicidality  Goal: Will have no self-injury during hospital stay  Description: INTERVENTIONS:  1.  Ensure constant observer at bedside with Q15M safety checks  2.  Maintain a safe environment  3.  Secure patient belongings  4.  Ensure family/visitors adhere to safety recommendations  5.  Ensure safety tray has been added to patient's diet order  6.  Every shift and PRN: Re-assess suicidal risk via Frequent Screener    12/20/2024 2053 by Monica Iqbal, RN  Outcome: Progressing     Problem: Depression  Goal: Will be euthymic at discharge  Description: INTERVENTIONS:  1. Administer medication as ordered  2. Provide emotional support via 1:1 interaction with staff  3. Encourage involvement in milieu/groups/activities  4. Monitor for social isolation  12/20/2024 2053 by Monica Iqbal RN  Outcome: Progressing     Problem: Anxiety  Goal: Will report anxiety at manageable levels  Description: INTERVENTIONS:  1. Administer medication as ordered  2. Teach and rehearse alternative coping skills  3. Provide emotional support with 1:1 interaction with staff  12/20/2024 2053 by Monica Iqbal RN  Outcome: Progressing     Problem: Drug Abuse/Detox  Goal: Will have no detox symptoms and will verbalize plan for changing drug-related behavior  Description: INTERVENTIONS:  1. Administer medication as ordered  2. Monitor physical status  3. Provide emotional support with 1:1 interaction with staff  4. Encourage  recovery focused treatment   Outcome: Progressing     
Problem: Self Harm/Suicidality  Goal: Will have no self-injury during hospital stay  Description: INTERVENTIONS:  1.  Ensure constant observer at bedside with Q15M safety checks  2.  Maintain a safe environment  3.  Secure patient belongings  4.  Ensure family/visitors adhere to safety recommendations  5.  Ensure safety tray has been added to patient's diet order  6.  Every shift and PRN: Re-assess suicidal risk via Frequent Screener  Outcome: Progressing     Problem: Anxiety  Goal: Will report anxiety at manageable levels  Description: INTERVENTIONS:  1. Administer medication as ordered  2. Teach and rehearse alternative coping skills  3. Provide emotional support with 1:1 interaction with staff  12/19/2024 2237 by Tamiko Rome RN  Outcome: Progressing     Problem: Sleep Disturbance  Goal: Will exhibit normal sleeping pattern  Description: INTERVENTIONS:  1. Administer medication as ordered  2. Decrease environmental stimuli, including noise, as appropriate  3. Discourage social isolation and naps during the day  Outcome: Progressing     Problem: Involuntary Admit  Goal: Will cooperate with staff recommendations and doctor's orders and will demonstrate appropriate behavior  Description: INTERVENTIONS:  1. Treat underlying conditions and offer medication as ordered  2. Educate regarding involuntary admission procedures and rules  3. Contain excessive/inappropriate behavior per unit and hospital policies  Outcome: Progressing     Patient denies suicidal ideation, homicidal ideations and hallucinations. Appears flat on approach but brightens with conversation. Denies any anxiety or depression at this time. Presents friendly, calm, and cooperative during assessment. Patient is out on the unit and is social with peers. Medications taken without issue. No complaints or concerns verbalized at this time. No unit problems reported. Will continue to observe and support.  
Problem: Self Harm/Suicidality  Goal: Will have no self-injury during hospital stay  Description: INTERVENTIONS:  1.  Ensure constant observer at bedside with Q15M safety checks  2.  Maintain a safe environment  3.  Secure patient belongings  4.  Ensure family/visitors adhere to safety recommendations  5.  Ensure safety tray has been added to patient's diet order  6.  Every shift and PRN: Re-assess suicidal risk via Frequent Screener  Outcome: Progressing     Problem: Depression  Goal: Will be euthymic at discharge  Description: INTERVENTIONS:  1. Administer medication as ordered  2. Provide emotional support via 1:1 interaction with staff  3. Encourage involvement in milieu/groups/activities  4. Monitor for social isolation  Outcome: Progressing     Problem: Anxiety  Goal: Will report anxiety at manageable levels  Description: INTERVENTIONS:  1. Administer medication as ordered  2. Teach and rehearse alternative coping skills  3. Provide emotional support with 1:1 interaction with staff  12/18/2024 2225 by Tamiko Rome RN  Outcome: Progressing     Problem: Involuntary Admit  Goal: Will cooperate with staff recommendations and doctor's orders and will demonstrate appropriate behavior  Description: INTERVENTIONS:  1. Treat underlying conditions and offer medication as ordered  2. Educate regarding involuntary admission procedures and rules  3. Contain excessive/inappropriate behavior per unit and hospital policies  Outcome: Progressing     Problem: Pain  Goal: Verbalizes/displays adequate comfort level or baseline comfort level  Outcome: Progressing     Patient denies suicidal ideation, homicidal ideations and hallucinations. Appears flat, sad, guarded, and depressed. Reports some anxiety d/t outside money and relationship issues. Denies any depression at this time. Presents calm and cooperative during assessment. Patient is out on the unit but not seen socializing with peers. Medications taken without issue. 
Pt denies homicidal/suicidal thoughts. Denies hallucinations. Pt is compliant with medications. Pt attended group therapy. Pt is cooperative. Pt denies anxiety and depression. Pt is guarded and withdrawn. Will continue to monitor and provide support.   Problem: Self Harm/Suicidality  Goal: Will have no self-injury during hospital stay  Description: INTERVENTIONS:  1.  Ensure constant observer at bedside with Q15M safety checks  2.  Maintain a safe environment  3.  Secure patient belongings  4.  Ensure family/visitors adhere to safety recommendations  5.  Ensure safety tray has been added to patient's diet order  6.  Every shift and PRN: Re-assess suicidal risk via Frequent Screener    12/18/2024 2225 by Tamiko Rome RN  Outcome: Progressing     Problem: Depression  Goal: Will be euthymic at discharge  Description: INTERVENTIONS:  1. Administer medication as ordered  2. Provide emotional support via 1:1 interaction with staff  3. Encourage involvement in milieu/groups/activities  4. Monitor for social isolation  12/18/2024 2225 by Tamiko Rome RN  Outcome: Progressing     Problem: Hellen  Goal: Will exhibit normal sleep and speech and no impulsivity  Description: INTERVENTIONS:  1. Administer medication as ordered  2. Set limits on impulsive behavior  3. Make attempts to decrease external stimuli as possible  Outcome: Progressing     Problem: Behavior  Goal: Pt/Family maintain appropriate behavior and adhere to behavioral management agreement, if implemented  Description: INTERVENTIONS:  1. Assess patient/family's coping skills and  non-compliant behavior (including use of illegal substances)  2. Notify security of behavior or suspected illegal substances which indicate the need for search of the family and/or belongings  3. Encourage verbalization of thoughts and concerns in a socially appropriate manner  4. Utilize positive, consistent limit setting strategies supporting safety of patient, staff and 
policy.  7. Initiate consult with , Psychosocial CNS, Spiritual Care as appropriate  Outcome: Progressing     Problem: Anxiety  Goal: Will report anxiety at manageable levels  Description: INTERVENTIONS:  1. Administer medication as ordered  2. Teach and rehearse alternative coping skills  3. Provide emotional support with 1:1 interaction with staff  Outcome: Progressing     Problem: Drug Abuse/Detox  Goal: Will have no detox symptoms and will verbalize plan for changing drug-related behavior  Description: INTERVENTIONS:  1. Administer medication as ordered  2. Monitor physical status  3. Provide emotional support with 1:1 interaction with staff  4. Encourage  recovery focused treatment   Outcome: Progressing     Problem: Sleep Disturbance  Goal: Will exhibit normal sleeping pattern  Description: INTERVENTIONS:  1. Administer medication as ordered  2. Decrease environmental stimuli, including noise, as appropriate  3. Discourage social isolation and naps during the day  Outcome: Progressing     Problem: Involuntary Admit  Goal: Will cooperate with staff recommendations and doctor's orders and will demonstrate appropriate behavior  Description: INTERVENTIONS:  1. Treat underlying conditions and offer medication as ordered  2. Educate regarding involuntary admission procedures and rules  3. Contain excessive/inappropriate behavior per unit and hospital policies  Outcome: Completed

## 2024-12-24 NOTE — PROGRESS NOTES
BEHAVIORAL HEALTH FOLLOW-UP NOTE     12/21/2024     Patient was seen and examined in person, Chart reviewed   Patient's case discussed with staff/team    Chief Complaint: Going through alcohol withdrawal and expressed suicidal ideations     Interim History:   Patient was seen in his room currently lying in bed he was seen on the unit earlier this morning socializing with peers.  He is currently calm, cooperative he does remain flat.  He states he is beginning to feel better on the medication.  He denies any alcohol withdrawal symptoms.  He denies suicidal ideation, denies homicidal ideation he denies auditory visual hallucinations.  He has been taking his medication, he said no behavioral disturbances on the unit, he is social and he is going to group.  He rates his anxiety and depression 2/10.  Sleep and appetite reported to be fair    Appetite: [x] Normal/Unchanged  [] Increased  [] Decreased      Sleep:       [x] Normal/Unchanged  [] Fair       [] Poor              Energy:    [x] Normal/Unchanged  [] Increased  [] Decreased        SI [] Present  [x] Absent    HI  []Present  [x] Absent     Aggression:  [] yes  [x] no    Patient is [x] able  [] unable to CONTRACT FOR SAFETY     PAST MEDICAL/PSYCHIATRIC HISTORY:   Past Medical History:   Diagnosis Date    Benign essential tremor     Tremor        FAMILY/SOCIAL HISTORY:  Family History   Problem Relation Age of Onset    Diabetes Mother     Cancer Father     Asthma Sister      Social History     Socioeconomic History    Marital status: Single     Spouse name: Not on file    Number of children: Not on file    Years of education: Not on file    Highest education level: Not on file   Occupational History    Not on file   Tobacco Use    Smoking status: Every Day     Current packs/day: 0.50     Types: Cigars, Cigarettes    Smokeless tobacco: Never    Tobacco comments:     Black & Mariela    Vaping Use    Vaping status: Never Used   Substance and Sexual Activity    Alcohol 
BEHAVIORAL HEALTH FOLLOW-UP NOTE     12/23/2024     Patient was seen and examined in person, Chart reviewed   Patient's case discussed with staff/team    Chief Complaint: Going through alcohol withdrawal and expressed suicidal ideations     Interim History:   Patient was seen out on the unit he is socializing watching television.  He states that he is feeling better he does appear brighter.  He states that there was a change he states that his girlfriend has left and taken the dog.  He states that he is a little upset but he states \"it is what it is\".  He states he cannot focus on himself right now get himself better.  He states that his mother is supportive and has been there for him.  He denies suicidal ideation, denies homicidal ideation he denies auditory visual hallucinations.  He states he is agreeable for inpatient alcohol level rehab peer recovery and social work working on placement.  He has been taking his medication, he has had no behavioral disturbances on the unit, he is social with peers and going to group.  Sleep and appetite reported to be fair    Appetite: [x] Normal/Unchanged  [] Increased  [] Decreased      Sleep:       [x] Normal/Unchanged  [] Fair       [] Poor              Energy:    [x] Normal/Unchanged  [] Increased  [] Decreased        SI [] Present  [x] Absent    HI  []Present  [x] Absent     Aggression:  [] yes  [x] no    Patient is [x] able  [] unable to CONTRACT FOR SAFETY     PAST MEDICAL/PSYCHIATRIC HISTORY:   Past Medical History:   Diagnosis Date    Benign essential tremor     Tremor        FAMILY/SOCIAL HISTORY:  Family History   Problem Relation Age of Onset    Diabetes Mother     Cancer Father     Asthma Sister      Social History     Socioeconomic History    Marital status: Single     Spouse name: Not on file    Number of children: Not on file    Years of education: Not on file    Highest education level: Not on file   Occupational History    Not on file   Tobacco Use    Smoking 
CLINICAL PHARMACY NOTE: MEDS TO BEDS    Total # of Prescriptions Filled: 2   The following medications were delivered to the patient:  Divalproex sodium 250 mg  Paroxetine 20 mg    Additional Documentation:   LASHONDA Jeter picked up in the pharmacy  
Patient declined invitation to the following groups:    Community Meeting    Patient will continue to be provided with opportunities to enhance leisure skills/interests and/or coping mechanisms.  
Patient resting quiet to self at this time, respirations are even and unlabored, no signs or symptoms of distress or discomfort. Staff will continue to conduct environmental rounds to ensure the safety of everyone on the unit. Staff will provide support and interventions as requested or required.    
Patient resting quietly in bed with eyes closed. Respirations even and unlabored with no signs of distress observed. Environmental rounds continued.  
Spiritual Health History and Assessment/Progress Note  [facility]    Spiritual/Emotional Needs, Behavioral Health,  ,  ,      Name: Michelet Anthony MRN: [id number]    Age: 39 y.o.     Sex: male   Language: English   Jehovah's witness: None   Mood disorder (HCC)     Date: 12/18/2024                           Spiritual Assessment began in [facility] AC[facility]  2        Referral/Consult From: Nurse   Encounter Overview/Reason: Spiritual/Emotional Needs, Behavioral Health  Service Provided For: Patient    Entered patients room he was in bed, I asked how he was doing and asked if there was anything else need. I also let him know if he had any questions of needed anything from the spiritual care team, let the nursing staff know they will call and we will visit. He declined prayer and any other reg.    Maricarmen, Belief, Meaning:   Patient identifies as spiritual and is connected with a maricarmen tradition or spiritual practice  Family/Friends No family/friends present      Importance and Influence:  Patient has spiritual/personal beliefs that influence decisions regarding their health  Family/Friends No family/friends present    Community:  Patient is connected with a spiritual community  Family/Friends feel well-supported. Support system includes: Parent/s    Assessment and Plan of Care:     Patient Interventions include: Facilitated expression of thoughts and feelings and Explored spiritual coping/struggle/distress  Family/Friends Interventions include: No family/friends present    Patient Plan of Care: Spiritual Care available upon further referral  Family/Friends Plan of Care: No family/friends present    Electronically signed by Chaplain Amor on 12/18/2024 at 6:42 PM   
               ( ) Basic information about quitting (benefits of quitting, techniques in how to quit, available resources  (X ) [facility]                                                                                                                   ( ) Patient refused counseling  ( ) Patient refused referral  ( ) Patient refused prescription upon discharge  ( ) Patient has not smoked in the last 30 days    Metabolic Screening:    Lab Results   Component Value Date    LABA1C 4.8 12/19/2024       Lab Results   Component Value Date    CHOL 203 (H) 12/19/2024     Lab Results   Component Value Date    TRIG 243 (H) 12/19/2024     Lab Results   Component Value Date    HDL 47 12/19/2024     No components found for: \"LDLCAL\"  No components found for: \"LABVLDL\"    Amy Marcos RN    
consciousness:  within normal limits   Appearance:  fair grooming and fair hygiene  Behavior/Motor:  psychomotor agitation, tremors  Attitude toward examiner:  cooperative and guarded  Speech:  spontaneous, normal rate, and normal volume   Mood: sad  Affect:  mood congruent and flat  Thought processes:  linear   Thought content:  Devoid of auditory and visual hallucinations, delusions or any other perceptual abnormalities. Denies suicidal ideation, intent or plan. Denies homicidal ideation intent or plan  Language:intact  Remote Memory:intact  Recent Memory: intact  Cognition:  oriented to person, place, and time   Fund of Knowledge: adequate  Attention: intact  Concentration intact  Insight limited   Judgement limited       ASSESSMENT: Patient symptoms are:  [] Well controlled  [] Improving  [] Worsening  [x] No change      Diagnosis:  Principal Problem:    Major depressive disorder, recurrent episode, severe with mixed features (HCC)  Active Problems:    Alcohol abuse  Resolved Problems:    * No resolved hospital problems. *      LABS:    No results for input(s): \"WBC\", \"HGB\", \"PLT\" in the last 72 hours.  No results for input(s): \"NA\", \"K\", \"CL\", \"CO2\", \"BUN\", \"CREATININE\", \"GLUCOSE\" in the last 72 hours.  No results for input(s): \"BILITOT\", \"ALKPHOS\", \"AST\", \"ALT\" in the last 72 hours.  Lab Results   Component Value Date/Time    BARBSCNU NEGATIVE 12/13/2024 04:45 PM    LABBENZ POSITIVE 12/13/2024 04:45 PM    LABMETH NEGATIVE 12/13/2024 04:45 PM    ETOH <10 12/15/2024 02:55 PM     No results found for: \"TSH\", \"FREET4\"  No results found for: \"LITHIUM\"  No results found for: \"VALPROATE\", \"CBMZ\"        Treatment Plan:  Reviewed current Medications with the patient.   Risks, benefits, side effects, drug-to-drug interactions and alternatives to treatment were discussed.  Collateral information:   CD evaluation  Encourage patient to attend group and other milieu activities.  Discharge planning discussed with the patient 
spontaneous, normal rate, and normal volume   Mood: \"better\"  Affect: Mood congruent appropriate and pleasant thought processes:  linear   Thought content:  Devoid of auditory and visual hallucinations, delusions or any other perceptual abnormalities. Denies suicidal ideation, intent or plan. Denies homicidal ideation intent or plan  Language:intact  Remote Memory:intact  Recent Memory: intact  Cognition:  oriented to person, place, and time   Fund of Knowledge: adequate  Attention: intact  Concentration intact  Insight limited   Judgement limited       ASSESSMENT: Patient symptoms are:  [] Well controlled  [x] Improving  [] Worsening  [] No change      Diagnosis:  Principal Problem:    Major depressive disorder, recurrent episode, severe with mixed features (HCC)  Active Problems:    Alcohol abuse  Resolved Problems:    * No resolved hospital problems. *      LABS:    No results for input(s): \"WBC\", \"HGB\", \"PLT\" in the last 72 hours.  No results for input(s): \"NA\", \"K\", \"CL\", \"CO2\", \"BUN\", \"CREATININE\", \"GLUCOSE\" in the last 72 hours.  No results for input(s): \"BILITOT\", \"ALKPHOS\", \"AST\", \"ALT\" in the last 72 hours.  Lab Results   Component Value Date/Time    BARBSCNU NEGATIVE 12/13/2024 04:45 PM    LABBENZ POSITIVE 12/13/2024 04:45 PM    LABMETH NEGATIVE 12/13/2024 04:45 PM    ETOH <10 12/15/2024 02:55 PM     No results found for: \"TSH\", \"FREET4\"  No results found for: \"LITHIUM\"  No results found for: \"VALPROATE\", \"CBMZ\"        Treatment Plan:  Reviewed current Medications with the patient.   Risks, benefits, side effects, drug-to-drug interactions and alternatives to treatment were discussed.  Collateral information:   CD evaluation  Encourage patient to attend group and other milieu activities.  Discharge planning discussed with the patient and treatment team.    Mood Stabilizer  Depakote 250 mg twice daily  Paxil 20 mg nightly  CIWA protocol Librium 25 mg every 8 hours as needed along with thiamine,

## 2024-12-25 ENCOUNTER — FOLLOWUP TELEPHONE ENCOUNTER (OUTPATIENT)
Dept: PSYCHIATRY | Age: 39
End: 2024-12-25

## 2024-12-25 NOTE — TELEPHONE ENCOUNTER
Provider reached out to the patient regarding status following discharge. Patient did not answer call. Provider left VM for the patient to return call and/or visit ED should they have any questions or if any symptoms worsen.     Shasha Crisostomo, LPC